# Patient Record
Sex: FEMALE | Race: WHITE | URBAN - METROPOLITAN AREA
[De-identification: names, ages, dates, MRNs, and addresses within clinical notes are randomized per-mention and may not be internally consistent; named-entity substitution may affect disease eponyms.]

---

## 2018-07-02 ENCOUNTER — HOSPITAL ENCOUNTER (OUTPATIENT)
Dept: GENERAL RADIOLOGY | Age: 83
Discharge: HOME | End: 2018-07-02
Attending: ORTHOPAEDIC SURGERY | Admitting: ORTHOPAEDIC SURGERY

## 2018-07-02 DIAGNOSIS — M25.511 PAIN IN RIGHT SHOULDER: ICD-10-CM

## 2018-07-02 DIAGNOSIS — M25.511 ACUTE PAIN OF RIGHT SHOULDER: ICD-10-CM

## 2018-07-02 DIAGNOSIS — M25.511 RIGHT SHOULDER PAIN, UNSPECIFIED CHRONICITY: ICD-10-CM

## 2018-07-02 RX ORDER — LIDOCAINE HYDROCHLORIDE 10 MG/ML
INJECTION, SOLUTION EPIDURAL; INFILTRATION; INTRACAUDAL; PERINEURAL
Status: COMPLETED
Start: 2018-07-02 | End: 2018-07-02

## 2018-07-02 RX ORDER — 0.9 % SODIUM CHLORIDE 0.9 %
VIAL (ML) INJECTION
Status: COMPLETED
Start: 2018-07-02 | End: 2018-07-02

## 2018-07-02 RX ADMIN — LIDOCAINE HYDROCHLORIDE 5 ML: 10 INJECTION, SOLUTION EPIDURAL; INFILTRATION; INTRACAUDAL; PERINEURAL at 11:51

## 2018-07-02 RX ADMIN — Medication 10 ML: at 11:50

## 2023-08-26 ENCOUNTER — HOSPITAL ENCOUNTER (INPATIENT)
Age: 88
LOS: 3 days | Discharge: NURSING, REHAB OR LONG-TERM CARE | DRG: 066 | End: 2023-08-29
Attending: STUDENT IN AN ORGANIZED HEALTH CARE EDUCATION/TRAINING PROGRAM | Admitting: STUDENT IN AN ORGANIZED HEALTH CARE EDUCATION/TRAINING PROGRAM
Payer: MEDICARE

## 2023-08-26 ENCOUNTER — APPOINTMENT (OUTPATIENT)
Dept: CT IMAGING | Age: 88
DRG: 066 | End: 2023-08-26
Payer: MEDICARE

## 2023-08-26 DIAGNOSIS — R29.898 ARM WEAKNESS: ICD-10-CM

## 2023-08-26 DIAGNOSIS — H53.2 DIPLOPIA: Primary | ICD-10-CM

## 2023-08-26 PROBLEM — I10 ESSENTIAL HYPERTENSION: Status: ACTIVE | Noted: 2023-08-26

## 2023-08-26 PROBLEM — I63.9 LEFT PONTINE STROKE (HCC): Status: ACTIVE | Noted: 2023-08-26

## 2023-08-26 PROBLEM — H51.22 INTERNUCLEAR OPHTHALMOPLEGIA OF LEFT EYE: Status: ACTIVE | Noted: 2023-08-26

## 2023-08-26 PROBLEM — E78.49 OTHER HYPERLIPIDEMIA: Status: ACTIVE | Noted: 2023-08-26

## 2023-08-26 LAB
ALBUMIN SERPL-MCNC: 3.5 G/DL (ref 3.4–5)
ALBUMIN/GLOB SERPL: 1.1 {RATIO} (ref 1.1–2.2)
ALP SERPL-CCNC: 74 U/L (ref 40–129)
ALT SERPL-CCNC: 9 U/L (ref 10–40)
ANION GAP SERPL CALCULATED.3IONS-SCNC: 13 MMOL/L (ref 3–16)
AST SERPL-CCNC: 18 U/L (ref 15–37)
BASOPHILS # BLD: 0 K/UL (ref 0–0.2)
BASOPHILS NFR BLD: 0.4 %
BILIRUB SERPL-MCNC: 0.8 MG/DL (ref 0–1)
BUN SERPL-MCNC: 14 MG/DL (ref 7–20)
CALCIUM SERPL-MCNC: 9 MG/DL (ref 8.3–10.6)
CHLORIDE SERPL-SCNC: 99 MMOL/L (ref 99–110)
CHOLEST SERPL-MCNC: 153 MG/DL (ref 0–199)
CO2 SERPL-SCNC: 22 MMOL/L (ref 21–32)
CREAT SERPL-MCNC: 1 MG/DL (ref 0.6–1.2)
DEPRECATED RDW RBC AUTO: 14.6 % (ref 12.4–15.4)
EKG ATRIAL RATE: 60 BPM
EKG DIAGNOSIS: NORMAL
EKG P-R INTERVAL: 218 MS
EKG Q-T INTERVAL: 484 MS
EKG QRS DURATION: 154 MS
EKG QTC CALCULATION (BAZETT): 484 MS
EKG R AXIS: -81 DEGREES
EKG T AXIS: 86 DEGREES
EKG VENTRICULAR RATE: 60 BPM
EOSINOPHIL # BLD: 0.1 K/UL (ref 0–0.6)
EOSINOPHIL NFR BLD: 0.6 %
GFR SERPLBLD CREATININE-BSD FMLA CKD-EPI: 53 ML/MIN/{1.73_M2}
GLUCOSE BLD-MCNC: 122 MG/DL (ref 70–99)
GLUCOSE SERPL-MCNC: 122 MG/DL (ref 70–99)
HCT VFR BLD AUTO: 37.3 % (ref 36–48)
HDLC SERPL-MCNC: 34 MG/DL (ref 40–60)
HGB BLD-MCNC: 13 G/DL (ref 12–16)
INR PPP: 1.2 (ref 0.84–1.16)
LDLC SERPL CALC-MCNC: 100 MG/DL
LYMPHOCYTES # BLD: 0.7 K/UL (ref 1–5.1)
LYMPHOCYTES NFR BLD: 7.7 %
MCH RBC QN AUTO: 30.6 PG (ref 26–34)
MCHC RBC AUTO-ENTMCNC: 34.7 G/DL (ref 31–36)
MCV RBC AUTO: 88.1 FL (ref 80–100)
MONOCYTES # BLD: 0.9 K/UL (ref 0–1.3)
MONOCYTES NFR BLD: 9.6 %
NEUTROPHILS # BLD: 7.6 K/UL (ref 1.7–7.7)
NEUTROPHILS NFR BLD: 81.7 %
PERFORMED ON: ABNORMAL
PLATELET # BLD AUTO: 208 K/UL (ref 135–450)
PMV BLD AUTO: 9 FL (ref 5–10.5)
POTASSIUM SERPL-SCNC: 4 MMOL/L (ref 3.5–5.1)
PROT SERPL-MCNC: 6.7 G/DL (ref 6.4–8.2)
PROTHROMBIN TIME: 15.2 SEC (ref 11.5–14.8)
RBC # BLD AUTO: 4.24 M/UL (ref 4–5.2)
SODIUM SERPL-SCNC: 134 MMOL/L (ref 136–145)
TRIGL SERPL-MCNC: 94 MG/DL (ref 0–150)
TROPONIN, HIGH SENSITIVITY: 93 NG/L (ref 0–14)
VLDLC SERPL CALC-MCNC: 19 MG/DL
WBC # BLD AUTO: 9.3 K/UL (ref 4–11)

## 2023-08-26 PROCEDURE — 6370000000 HC RX 637 (ALT 250 FOR IP): Performed by: STUDENT IN AN ORGANIZED HEALTH CARE EDUCATION/TRAINING PROGRAM

## 2023-08-26 PROCEDURE — 83036 HEMOGLOBIN GLYCOSYLATED A1C: CPT

## 2023-08-26 PROCEDURE — 84484 ASSAY OF TROPONIN QUANT: CPT

## 2023-08-26 PROCEDURE — 93005 ELECTROCARDIOGRAM TRACING: CPT

## 2023-08-26 PROCEDURE — 70450 CT HEAD/BRAIN W/O DYE: CPT

## 2023-08-26 PROCEDURE — 80053 COMPREHEN METABOLIC PANEL: CPT

## 2023-08-26 PROCEDURE — 6370000000 HC RX 637 (ALT 250 FOR IP): Performed by: PSYCHIATRY & NEUROLOGY

## 2023-08-26 PROCEDURE — 6360000002 HC RX W HCPCS: Performed by: STUDENT IN AN ORGANIZED HEALTH CARE EDUCATION/TRAINING PROGRAM

## 2023-08-26 PROCEDURE — 99285 EMERGENCY DEPT VISIT HI MDM: CPT

## 2023-08-26 PROCEDURE — 85025 COMPLETE CBC W/AUTO DIFF WBC: CPT

## 2023-08-26 PROCEDURE — 2580000003 HC RX 258: Performed by: STUDENT IN AN ORGANIZED HEALTH CARE EDUCATION/TRAINING PROGRAM

## 2023-08-26 PROCEDURE — 99223 1ST HOSP IP/OBS HIGH 75: CPT | Performed by: PSYCHIATRY & NEUROLOGY

## 2023-08-26 PROCEDURE — 1200000000 HC SEMI PRIVATE

## 2023-08-26 PROCEDURE — 70496 CT ANGIOGRAPHY HEAD: CPT

## 2023-08-26 PROCEDURE — 80061 LIPID PANEL: CPT

## 2023-08-26 PROCEDURE — 85610 PROTHROMBIN TIME: CPT

## 2023-08-26 PROCEDURE — 6360000004 HC RX CONTRAST MEDICATION: Performed by: STUDENT IN AN ORGANIZED HEALTH CARE EDUCATION/TRAINING PROGRAM

## 2023-08-26 RX ORDER — ATORVASTATIN CALCIUM 40 MG/1
40 TABLET, FILM COATED ORAL NIGHTLY
Status: DISCONTINUED | OUTPATIENT
Start: 2023-08-26 | End: 2023-08-29 | Stop reason: HOSPADM

## 2023-08-26 RX ORDER — SODIUM CHLORIDE 9 MG/ML
INJECTION, SOLUTION INTRAVENOUS PRN
Status: DISCONTINUED | OUTPATIENT
Start: 2023-08-26 | End: 2023-08-29 | Stop reason: HOSPADM

## 2023-08-26 RX ORDER — METOPROLOL SUCCINATE 25 MG/1
12.5 TABLET, EXTENDED RELEASE ORAL ONCE
Status: DISCONTINUED | OUTPATIENT
Start: 2023-08-26 | End: 2023-08-26

## 2023-08-26 RX ORDER — ACETAMINOPHEN 650 MG/1
650 SUPPOSITORY RECTAL EVERY 6 HOURS PRN
Status: DISCONTINUED | OUTPATIENT
Start: 2023-08-26 | End: 2023-08-29 | Stop reason: HOSPADM

## 2023-08-26 RX ORDER — ENOXAPARIN SODIUM 100 MG/ML
40 INJECTION SUBCUTANEOUS DAILY
Status: DISCONTINUED | OUTPATIENT
Start: 2023-08-26 | End: 2023-08-29 | Stop reason: HOSPADM

## 2023-08-26 RX ORDER — ONDANSETRON 2 MG/ML
4 INJECTION INTRAMUSCULAR; INTRAVENOUS EVERY 6 HOURS PRN
Status: DISCONTINUED | OUTPATIENT
Start: 2023-08-26 | End: 2023-08-29 | Stop reason: HOSPADM

## 2023-08-26 RX ORDER — SODIUM CHLORIDE 0.9 % (FLUSH) 0.9 %
5-40 SYRINGE (ML) INJECTION EVERY 12 HOURS SCHEDULED
Status: DISCONTINUED | OUTPATIENT
Start: 2023-08-26 | End: 2023-08-29 | Stop reason: HOSPADM

## 2023-08-26 RX ORDER — LEVOTHYROXINE SODIUM 0.05 MG/1
50 TABLET ORAL DAILY
Status: DISCONTINUED | OUTPATIENT
Start: 2023-08-26 | End: 2023-08-29 | Stop reason: HOSPADM

## 2023-08-26 RX ORDER — ASPIRIN 81 MG/1
81 TABLET ORAL DAILY
Status: DISCONTINUED | OUTPATIENT
Start: 2023-08-26 | End: 2023-08-29 | Stop reason: HOSPADM

## 2023-08-26 RX ORDER — SODIUM CHLORIDE 0.9 % (FLUSH) 0.9 %
5-40 SYRINGE (ML) INJECTION PRN
Status: DISCONTINUED | OUTPATIENT
Start: 2023-08-26 | End: 2023-08-29 | Stop reason: HOSPADM

## 2023-08-26 RX ORDER — POLYETHYLENE GLYCOL 3350 17 G/17G
17 POWDER, FOR SOLUTION ORAL DAILY PRN
Status: DISCONTINUED | OUTPATIENT
Start: 2023-08-26 | End: 2023-08-29 | Stop reason: HOSPADM

## 2023-08-26 RX ORDER — ONDANSETRON 4 MG/1
4 TABLET, ORALLY DISINTEGRATING ORAL EVERY 8 HOURS PRN
Status: DISCONTINUED | OUTPATIENT
Start: 2023-08-26 | End: 2023-08-29 | Stop reason: HOSPADM

## 2023-08-26 RX ORDER — CLOPIDOGREL BISULFATE 75 MG/1
75 TABLET ORAL DAILY
Status: DISCONTINUED | OUTPATIENT
Start: 2023-08-26 | End: 2023-08-29 | Stop reason: HOSPADM

## 2023-08-26 RX ORDER — ACETAMINOPHEN 325 MG/1
650 TABLET ORAL EVERY 6 HOURS PRN
Status: DISCONTINUED | OUTPATIENT
Start: 2023-08-26 | End: 2023-08-29 | Stop reason: HOSPADM

## 2023-08-26 RX ORDER — ATORVASTATIN CALCIUM 20 MG/1
10 TABLET, FILM COATED ORAL ONCE
Status: DISCONTINUED | OUTPATIENT
Start: 2023-08-26 | End: 2023-08-26 | Stop reason: SDUPTHER

## 2023-08-26 RX ORDER — LEVOTHYROXINE SODIUM 0.05 MG/1
50 TABLET ORAL DAILY
Status: CANCELLED | OUTPATIENT
Start: 2023-08-26

## 2023-08-26 RX ADMIN — ATORVASTATIN CALCIUM 40 MG: 40 TABLET, FILM COATED ORAL at 20:52

## 2023-08-26 RX ADMIN — CLOPIDOGREL BISULFATE 75 MG: 75 TABLET ORAL at 19:04

## 2023-08-26 RX ADMIN — IOPAMIDOL 75 ML: 755 INJECTION, SOLUTION INTRAVENOUS at 12:18

## 2023-08-26 RX ADMIN — SODIUM CHLORIDE, PRESERVATIVE FREE 10 ML: 5 INJECTION INTRAVENOUS at 20:52

## 2023-08-26 RX ADMIN — ENOXAPARIN SODIUM 40 MG: 100 INJECTION SUBCUTANEOUS at 19:04

## 2023-08-26 RX ADMIN — ATORVASTATIN CALCIUM 10 MG: 20 TABLET, FILM COATED ORAL at 16:39

## 2023-08-26 RX ADMIN — ASPIRIN 81 MG: 81 TABLET, COATED ORAL at 19:04

## 2023-08-26 ASSESSMENT — LIFESTYLE VARIABLES
HOW MANY STANDARD DRINKS CONTAINING ALCOHOL DO YOU HAVE ON A TYPICAL DAY: PATIENT DOES NOT DRINK
HOW OFTEN DO YOU HAVE A DRINK CONTAINING ALCOHOL: NEVER

## 2023-08-26 ASSESSMENT — PAIN - FUNCTIONAL ASSESSMENT: PAIN_FUNCTIONAL_ASSESSMENT: NONE - DENIES PAIN

## 2023-08-26 NOTE — H&P
Hospital Medicine History & Physical      Date of Admission: 8/26/2023    Date of Service:  8/26/2023    [x]Admitted to Inpatient with expected LOS greater than two midnights due to medical therapy. []Placed in Observation status. Chief Admission Complaint: Double vision    Presenting Admission History: This is a 41-year-old female with past medical history of sick sinus syndrome s/p pacemaker, hypertension on metoprolol, hypothyroidism who presented to the emergency department with double vision. Patient states she woke up this morning and was weak but unable to to state if she had a focal weakness. She remembers calling her children at the time with her bedside phone. One of her children arrived at home. Patient was found on the ground. Patient stated at the time of her weakness she also noted her vision was abnormal specifically she was seeing double. At the time of my evaluation her double vision comes and goes. Patient also stated she was dizzy along with weakness before sustaining a fall. As patient lives alone the events of her fall are not thoroughly determined. In the emergency department patient was hemodynamically stable and afebrile. Labs mostly unremarkable. CT head, CTA head and neck was unremarkable.   Given patient's last known normal not a tPA candidate         Assessment/Plan:      #Diplopia  Rule out stroke, possible syncopal episode  CTA head and neck, CTA head with no acute intracranial abnormalities  Obtain lipid panel, A1c  Neurology evaluation  We will hold antihypertensive for permissive hypertension  Monitor telemetry  PM interrogation  Check orthostatic blood pressure  Defer further imaging per neurology      #Hypothyroidism-continue with levothyroxine  Check TSH    #Sick sinus syndrome-s/p pacemaker    #Hyperlipidemia-continue statin  Discussed management of the case in detail w/ the Emergency Department Provider: Elba Swift      Physical Exam Performed:      BP (!) 139/105

## 2023-08-26 NOTE — ED NOTES
ED TO INPATIENT SBAR HANDOFF    Patient Name: Justin Wright   :  1932  80 y.o. MRN:  [id number]  Preferred Name  Beth Casey  ED Room #:  A07/A07-07  Family/Caregiver Present no   Restraints no   Sitter no   Sepsis Risk Score Sepsis Risk Score: 0.75    Situation  Code Status: Prior No additional code details. Allergies: No known allergies  Weight: Patient Vitals for the past 96 hrs (Last 3 readings):   Weight   23 1148 132 lb (59.9 kg)     Arrived from: home  Chief Complaint:   Chief Complaint   Patient presents with    Fall     Patient reports she got this AM about 9am to go to the bathroom and felt like she was going to pass out so she laid down on the floor so she didn't fall on her face, had double vision and lightheadedness before she fell    Eye Problem     Left eye unable to look to the right, patient reports that is a new finding since this AM     Hospital Problem/Diagnosis:  Principal Problem:    Diplopia  Resolved Problems:    * No resolved hospital problems. *    Imaging:   CTA HEAD NECK W CONTRAST   Final Result      1. No significant stenosis of the arteries of the neck. PROCEDURE: CT ANGIOGRAPHY HEAD WITH/WITHOUT CONTRAST      INDICATION: stroke      COMPARISON: none      TECHNIQUE: Axial CT imaging obtained through the head prior to and following   administration of IV contrast. Axial images, multiplanar reformatted images, and   maximum intensity projection images were reviewed for CT angiographic technique. IV contrast: 75 mL Isovue-370. FINDINGS:      ANTERIOR CIRCULATION: The intracranial internal carotid arteries, anterior   cerebral arteries, and middle cerebral arteries demonstrate no occlusion or   stenosis. No evidence for aneurysm or arteriovenous malformation. Anterior   cerebral arteries supplied via the left with hypoplastic or absent right A1.       POSTERIOR CIRCULATION: The bilateral vertebral arteries, basilar artery and   posterior cerebral arteries

## 2023-08-26 NOTE — CONSULTS
Neurology Consultation Note      Patient: Jovanni Martinez MRN: [id number]    YOB: 1932  Age: 80 y.o.   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  She feels that her right arm is weaker than baseline but it would be unusual for stroke (brainstem or otherwise) to lead to only shoulder weakness and completely spare the elbow and hand  Feel that it is not a coincidence that she has chronic right shoulder issues 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 and is having right shoulder weakness now (in other words, do not feel that her right arm weakness is related to her eye finding)  She cannot have MRI due to incompatible pacemaker leads, but MRI could potentially be negative (given how small the stroke would have to be to give this isolated symptom) and/or not   Stroke of this kind is undoubtedly secondary to small vessel ischemic disease and so identification and tight control of these risk factors is of main importance  Would interrogate pacer to see if any underlying afib, but nature of stroke of this type is not embolic (if afib present, then this warrants treatment irrespective of this stroke)  In her case, main modifiable risk factor appears to be her hypertension & lipids ( in May); and main non-modifiable risk factor is her age  Still need to exclude any contribution from lipids and/or

## 2023-08-27 LAB
ALBUMIN SERPL-MCNC: 3.1 G/DL (ref 3.4–5)
ALBUMIN/GLOB SERPL: 0.9 {RATIO} (ref 1.1–2.2)
ALP SERPL-CCNC: 73 U/L (ref 40–129)
ALT SERPL-CCNC: 8 U/L (ref 10–40)
ANION GAP SERPL CALCULATED.3IONS-SCNC: 13 MMOL/L (ref 3–16)
AST SERPL-CCNC: 17 U/L (ref 15–37)
BASOPHILS # BLD: 0.1 K/UL (ref 0–0.2)
BASOPHILS NFR BLD: 0.5 %
BILIRUB SERPL-MCNC: 0.7 MG/DL (ref 0–1)
BUN SERPL-MCNC: 13 MG/DL (ref 7–20)
CALCIUM SERPL-MCNC: 8.8 MG/DL (ref 8.3–10.6)
CHLORIDE SERPL-SCNC: 99 MMOL/L (ref 99–110)
CO2 SERPL-SCNC: 20 MMOL/L (ref 21–32)
CREAT SERPL-MCNC: 1 MG/DL (ref 0.6–1.2)
DEPRECATED RDW RBC AUTO: 14.7 % (ref 12.4–15.4)
EOSINOPHIL # BLD: 0.2 K/UL (ref 0–0.6)
EOSINOPHIL NFR BLD: 1.6 %
EST. AVERAGE GLUCOSE BLD GHB EST-MCNC: 114 MG/DL
GFR SERPLBLD CREATININE-BSD FMLA CKD-EPI: 53 ML/MIN/{1.73_M2}
GLUCOSE SERPL-MCNC: 156 MG/DL (ref 70–99)
HBA1C MFR BLD: 5.6 %
HCT VFR BLD AUTO: 38.6 % (ref 36–48)
HGB BLD-MCNC: 13.1 G/DL (ref 12–16)
LYMPHOCYTES # BLD: 0.7 K/UL (ref 1–5.1)
LYMPHOCYTES NFR BLD: 6.8 %
MCH RBC QN AUTO: 30.6 PG (ref 26–34)
MCHC RBC AUTO-ENTMCNC: 33.8 G/DL (ref 31–36)
MCV RBC AUTO: 90.5 FL (ref 80–100)
MONOCYTES # BLD: 0.7 K/UL (ref 0–1.3)
MONOCYTES NFR BLD: 7 %
NEUTROPHILS # BLD: 8.6 K/UL (ref 1.7–7.7)
NEUTROPHILS NFR BLD: 84.1 %
PHOSPHATE SERPL-MCNC: 2.7 MG/DL (ref 2.5–4.9)
PLATELET # BLD AUTO: 218 K/UL (ref 135–450)
PMV BLD AUTO: 8.6 FL (ref 5–10.5)
POTASSIUM SERPL-SCNC: 4 MMOL/L (ref 3.5–5.1)
PROT SERPL-MCNC: 6.6 G/DL (ref 6.4–8.2)
RBC # BLD AUTO: 4.27 M/UL (ref 4–5.2)
SODIUM SERPL-SCNC: 132 MMOL/L (ref 136–145)
WBC # BLD AUTO: 10.2 K/UL (ref 4–11)

## 2023-08-27 PROCEDURE — 1200000000 HC SEMI PRIVATE

## 2023-08-27 PROCEDURE — 85025 COMPLETE CBC W/AUTO DIFF WBC: CPT

## 2023-08-27 PROCEDURE — 36415 COLL VENOUS BLD VENIPUNCTURE: CPT

## 2023-08-27 PROCEDURE — 6370000000 HC RX 637 (ALT 250 FOR IP): Performed by: PSYCHIATRY & NEUROLOGY

## 2023-08-27 PROCEDURE — 6370000000 HC RX 637 (ALT 250 FOR IP): Performed by: STUDENT IN AN ORGANIZED HEALTH CARE EDUCATION/TRAINING PROGRAM

## 2023-08-27 PROCEDURE — 80053 COMPREHEN METABOLIC PANEL: CPT

## 2023-08-27 PROCEDURE — 6360000002 HC RX W HCPCS: Performed by: STUDENT IN AN ORGANIZED HEALTH CARE EDUCATION/TRAINING PROGRAM

## 2023-08-27 PROCEDURE — 92610 EVALUATE SWALLOWING FUNCTION: CPT

## 2023-08-27 PROCEDURE — 2580000003 HC RX 258: Performed by: STUDENT IN AN ORGANIZED HEALTH CARE EDUCATION/TRAINING PROGRAM

## 2023-08-27 PROCEDURE — 92523 SPEECH SOUND LANG COMPREHEN: CPT

## 2023-08-27 PROCEDURE — 84100 ASSAY OF PHOSPHORUS: CPT

## 2023-08-27 RX ADMIN — ATORVASTATIN CALCIUM 40 MG: 40 TABLET, FILM COATED ORAL at 21:42

## 2023-08-27 RX ADMIN — CLOPIDOGREL BISULFATE 75 MG: 75 TABLET ORAL at 09:47

## 2023-08-27 RX ADMIN — ENOXAPARIN SODIUM 40 MG: 100 INJECTION SUBCUTANEOUS at 09:47

## 2023-08-27 RX ADMIN — SODIUM CHLORIDE, PRESERVATIVE FREE 5 ML: 5 INJECTION INTRAVENOUS at 09:50

## 2023-08-27 RX ADMIN — ASPIRIN 81 MG: 81 TABLET, COATED ORAL at 09:47

## 2023-08-27 RX ADMIN — SODIUM CHLORIDE, PRESERVATIVE FREE 10 ML: 5 INJECTION INTRAVENOUS at 21:43

## 2023-08-27 RX ADMIN — LEVOTHYROXINE SODIUM 50 MCG: 50 TABLET ORAL at 06:55

## 2023-08-27 NOTE — PLAN OF CARE
Patient will tolerate  least restrictive diet without s/s of aspiration. Pt will increase functional communication/cognitive skills for daily living success.     Riaz Herman MA CCC/SLP [id number]

## 2023-08-27 NOTE — PLAN OF CARE
Problem: Discharge Planning  Goal: Discharge to home or other facility with appropriate resources  Outcome: Progressing  Flowsheets (Taken 8/26/2023 2242)  Discharge to home or other facility with appropriate resources:   Identify barriers to discharge with patient and caregiver   Refer to discharge planning if patient needs post-hospital services based on physician order or complex needs related to functional status, cognitive ability or social support system     Problem: Skin/Tissue Integrity  Goal: Absence of new skin breakdown  Description: 1. Monitor for areas of redness and/or skin breakdown  2. Assess vascular access sites hourly  3. Every 4-6 hours minimum:  Change oxygen saturation probe site  4. Every 4-6 hours:  If on nasal continuous positive airway pressure, respiratory therapy assess nares and determine need for appliance change or resting period.   Outcome: Progressing  Note: Skins remains intact     Problem: Safety - Adult  Goal: Free from fall injury  Outcome: Progressing  Flowsheets (Taken 8/26/2023 2242)  Free From Fall Injury: Instruct family/caregiver on patient safety     Problem: ABCDS Injury Assessment  Goal: Absence of physical injury  Outcome: Progressing  Flowsheets (Taken 8/26/2023 2242)  Absence of Physical Injury: Implement safety measures based on patient assessment

## 2023-08-28 PROCEDURE — 1200000000 HC SEMI PRIVATE

## 2023-08-28 PROCEDURE — 92507 TX SP LANG VOICE COMM INDIV: CPT

## 2023-08-28 PROCEDURE — 2580000003 HC RX 258: Performed by: STUDENT IN AN ORGANIZED HEALTH CARE EDUCATION/TRAINING PROGRAM

## 2023-08-28 PROCEDURE — 97166 OT EVAL MOD COMPLEX 45 MIN: CPT

## 2023-08-28 PROCEDURE — 6360000002 HC RX W HCPCS: Performed by: STUDENT IN AN ORGANIZED HEALTH CARE EDUCATION/TRAINING PROGRAM

## 2023-08-28 PROCEDURE — 6370000000 HC RX 637 (ALT 250 FOR IP): Performed by: STUDENT IN AN ORGANIZED HEALTH CARE EDUCATION/TRAINING PROGRAM

## 2023-08-28 PROCEDURE — 97162 PT EVAL MOD COMPLEX 30 MIN: CPT

## 2023-08-28 PROCEDURE — 97530 THERAPEUTIC ACTIVITIES: CPT

## 2023-08-28 PROCEDURE — 97535 SELF CARE MNGMENT TRAINING: CPT

## 2023-08-28 PROCEDURE — 6370000000 HC RX 637 (ALT 250 FOR IP): Performed by: PSYCHIATRY & NEUROLOGY

## 2023-08-28 PROCEDURE — 92526 ORAL FUNCTION THERAPY: CPT

## 2023-08-28 RX ORDER — METOPROLOL SUCCINATE 25 MG/1
12.5 TABLET, EXTENDED RELEASE ORAL DAILY
Status: DISCONTINUED | OUTPATIENT
Start: 2023-08-28 | End: 2023-08-29 | Stop reason: HOSPADM

## 2023-08-28 RX ORDER — ATORVASTATIN CALCIUM 20 MG/1
40 TABLET, FILM COATED ORAL DAILY
Qty: 30 TABLET | Refills: 1 | Status: SHIPPED | OUTPATIENT
Start: 2023-08-28

## 2023-08-28 RX ORDER — CLOPIDOGREL BISULFATE 75 MG/1
75 TABLET ORAL DAILY
Qty: 18 TABLET | Refills: 0
Start: 2023-08-29 | End: 2023-09-16

## 2023-08-28 RX ORDER — METOPROLOL SUCCINATE 25 MG/1
12.5 TABLET, EXTENDED RELEASE ORAL DAILY
Qty: 30 TABLET | Refills: 1
Start: 2023-08-28

## 2023-08-28 RX ADMIN — ENOXAPARIN SODIUM 40 MG: 100 INJECTION SUBCUTANEOUS at 09:04

## 2023-08-28 RX ADMIN — LEVOTHYROXINE SODIUM 50 MCG: 50 TABLET ORAL at 05:50

## 2023-08-28 RX ADMIN — SODIUM CHLORIDE, PRESERVATIVE FREE 10 ML: 5 INJECTION INTRAVENOUS at 20:17

## 2023-08-28 RX ADMIN — ASPIRIN 81 MG: 81 TABLET, COATED ORAL at 09:04

## 2023-08-28 RX ADMIN — ACETAMINOPHEN 650 MG: 325 TABLET ORAL at 09:11

## 2023-08-28 RX ADMIN — SODIUM CHLORIDE, PRESERVATIVE FREE 10 ML: 5 INJECTION INTRAVENOUS at 09:05

## 2023-08-28 RX ADMIN — ATORVASTATIN CALCIUM 40 MG: 40 TABLET, FILM COATED ORAL at 20:17

## 2023-08-28 RX ADMIN — CLOPIDOGREL BISULFATE 75 MG: 75 TABLET ORAL at 09:04

## 2023-08-28 ASSESSMENT — PAIN DESCRIPTION - ORIENTATION
ORIENTATION: RIGHT
ORIENTATION: RIGHT

## 2023-08-28 ASSESSMENT — PAIN SCALES - GENERAL
PAINLEVEL_OUTOF10: 3
PAINLEVEL_OUTOF10: 2
PAINLEVEL_OUTOF10: 0
PAINLEVEL_OUTOF10: 0

## 2023-08-28 ASSESSMENT — PAIN DESCRIPTION - LOCATION
LOCATION: EYE
LOCATION: EYE

## 2023-08-28 ASSESSMENT — PAIN DESCRIPTION - DESCRIPTORS
DESCRIPTORS: BURNING
DESCRIPTORS: BURNING

## 2023-08-28 ASSESSMENT — PAIN - FUNCTIONAL ASSESSMENT
PAIN_FUNCTIONAL_ASSESSMENT: PREVENTS OR INTERFERES SOME ACTIVE ACTIVITIES AND ADLS
PAIN_FUNCTIONAL_ASSESSMENT: PREVENTS OR INTERFERES SOME ACTIVE ACTIVITIES AND ADLS

## 2023-08-28 NOTE — CARE COORDINATION
Case Management Assessment  Initial Evaluation    Date/Time of Evaluation: 8/28/2023 4:21 PM  Assessment Completed by: Evette Fontana RN    If patient is discharged prior to next notation, then this note serves as note for discharge by case management. Patient Name: Vernon Jacobs                   YOB: 1932  Diagnosis: Diplopia [H53.2]  Arm weakness [R29.898]                   Date / Time: 8/26/2023 11:41 AM    Patient Admission Status: Inpatient   Readmission Risk (Low < 19, Mod (19-27), High > 27): Readmission Risk Score: 13.8    Current PCP: Maryann Norwood MD  PCP verified by CM? No    Chart Reviewed: Yes      History Provided by: Patient  Patient Orientation: Alert and Oriented    Patient Cognition: Alert    Hospitalization in the last 30 days (Readmission):  No    If yes, Readmission Assessment in  Navigator will be completed. Advance Directives:      Code Status: DNR-CCA   Patient's Primary Decision Maker is: Legal Next of Kin      Discharge Planning:    Patient lives with: Alone Type of Home: Independent Living  Primary Care Giver: Self  Patient Support Systems include: Children   Current Financial resources: Medicare  Current community resources: ECF/Home Care  Current services prior to admission: Durable Medical Equipment            Current DME: Merwyn Parra, Wheelchair            Type of Home Care services:  None    ADLS  Prior functional level: Independent in ADLs/IADLs  Current functional level: Assistance with the following:, Mobility    PT AM-PAC: 13 /24  OT AM-PAC: 14 /24    Family can provide assistance at DC: Yes  Would you like Case Management to discuss the discharge plan with any other family members/significant others, and if so, who?  No  Plans to Return to Present Housing: Unknown at present  Other Identified Issues/Barriers to RETURNING to current housing: none  Potential Assistance needed at discharge: N/A            Potential DME:    Patient expects to discharge to:

## 2023-08-28 NOTE — PLAN OF CARE
Problem: Discharge Planning  Goal: Discharge to home or other facility with appropriate resources  8/28/2023 0018 by Jarad Noble RN  Outcome: Progressing  Note: CM will follow for discharge. Flowsheets (Taken 8/27/2023 2300)  Discharge to home or other facility with appropriate resources: Identify barriers to discharge with patient and caregiver     Problem: Skin/Tissue Integrity  Goal: Absence of new skin breakdown  Description: 1. Monitor for areas of redness and/or skin breakdown  2. Assess vascular access sites hourly  3. Every 4-6 hours minimum:  Change oxygen saturation probe site  4. Every 4-6 hours:  If on nasal continuous positive airway pressure, respiratory therapy assess nares and determine need for appliance change or resting period. 8/28/2023 0018 by Jarad Noble RN  Outcome: Progressing     Problem: Safety - Adult  Goal: Free from fall injury  8/28/2023 0018 by Jarad Noble RN  Outcome: Progressing  Note: All standard safety precautions in place, call light within reach, no fall injury. Flowsheets (Taken 8/28/2023 0015)  Free From Fall Injury: Instruct family/caregiver on patient safety     Problem: ABCDS Injury Assessment  Goal: Absence of physical injury  Outcome: Progressing  Note: Pt free from physical injury.    Flowsheets (Taken 8/28/2023 0015)  Absence of Physical Injury: Implement safety measures based on patient assessment

## 2023-08-28 NOTE — PLAN OF CARE
Problem: Discharge Planning  Goal: Discharge to home or other facility with appropriate resources  8/28/2023 1404 by Loulou Gonzáles RN  Outcome: Progressing  Flowsheets (Taken 8/28/2023 0300 by Shobha Calzada RN)  Discharge to home or other facility with appropriate resources:   Identify barriers to discharge with patient and caregiver   Arrange for needed discharge resources and transportation as appropriate  Note: Patient will have all needs met prior to discharge      Problem: Safety - Adult  Goal: Free from fall injury  8/28/2023 1404 by Loulou Gonzáles RN  Outcome: Progressing  Note: Patient will remain fall free during stay by implementing and following all safety precautions.    8/28/2023 0018 by Shobha Calzada RN  Outcome: Progressing  Flowsheets (Taken 8/28/2023 0015)  Free From Fall Injury: Instruct family/caregiver on patient safety

## 2023-08-29 VITALS
OXYGEN SATURATION: 94 % | BODY MASS INDEX: 21.99 KG/M2 | WEIGHT: 132 LBS | HEIGHT: 65 IN | RESPIRATION RATE: 18 BRPM | SYSTOLIC BLOOD PRESSURE: 145 MMHG | TEMPERATURE: 98 F | HEART RATE: 62 BPM | DIASTOLIC BLOOD PRESSURE: 68 MMHG

## 2023-08-29 PROCEDURE — 6370000000 HC RX 637 (ALT 250 FOR IP): Performed by: STUDENT IN AN ORGANIZED HEALTH CARE EDUCATION/TRAINING PROGRAM

## 2023-08-29 PROCEDURE — 97110 THERAPEUTIC EXERCISES: CPT

## 2023-08-29 PROCEDURE — 97116 GAIT TRAINING THERAPY: CPT

## 2023-08-29 PROCEDURE — 6370000000 HC RX 637 (ALT 250 FOR IP): Performed by: PSYCHIATRY & NEUROLOGY

## 2023-08-29 PROCEDURE — 6360000002 HC RX W HCPCS: Performed by: STUDENT IN AN ORGANIZED HEALTH CARE EDUCATION/TRAINING PROGRAM

## 2023-08-29 PROCEDURE — 2580000003 HC RX 258: Performed by: STUDENT IN AN ORGANIZED HEALTH CARE EDUCATION/TRAINING PROGRAM

## 2023-08-29 PROCEDURE — 97530 THERAPEUTIC ACTIVITIES: CPT

## 2023-08-29 RX ADMIN — ASPIRIN 81 MG: 81 TABLET, COATED ORAL at 10:11

## 2023-08-29 RX ADMIN — CLOPIDOGREL BISULFATE 75 MG: 75 TABLET ORAL at 10:11

## 2023-08-29 RX ADMIN — ENOXAPARIN SODIUM 40 MG: 100 INJECTION SUBCUTANEOUS at 10:11

## 2023-08-29 RX ADMIN — ACETAMINOPHEN 650 MG: 325 TABLET ORAL at 11:15

## 2023-08-29 RX ADMIN — POLYETHYLENE GLYCOL 3350 17 G: 17 POWDER, FOR SOLUTION ORAL at 14:26

## 2023-08-29 RX ADMIN — LEVOTHYROXINE SODIUM 50 MCG: 50 TABLET ORAL at 05:22

## 2023-08-29 RX ADMIN — SODIUM CHLORIDE, PRESERVATIVE FREE 10 ML: 5 INJECTION INTRAVENOUS at 10:13

## 2023-08-29 ASSESSMENT — PAIN - FUNCTIONAL ASSESSMENT: PAIN_FUNCTIONAL_ASSESSMENT: ACTIVITIES ARE NOT PREVENTED

## 2023-08-29 ASSESSMENT — PAIN DESCRIPTION - PAIN TYPE: TYPE: CHRONIC PAIN

## 2023-08-29 ASSESSMENT — PAIN DESCRIPTION - DESCRIPTORS: DESCRIPTORS: ACHING

## 2023-08-29 ASSESSMENT — PAIN DESCRIPTION - LOCATION: LOCATION: BACK

## 2023-08-29 ASSESSMENT — PAIN DESCRIPTION - FREQUENCY: FREQUENCY: CONTINUOUS

## 2023-08-29 ASSESSMENT — PAIN DESCRIPTION - ORIENTATION: ORIENTATION: MID

## 2023-08-29 ASSESSMENT — PAIN DESCRIPTION - ONSET: ONSET: ON-GOING

## 2023-08-29 ASSESSMENT — PAIN SCALES - GENERAL
PAINLEVEL_OUTOF10: 6
PAINLEVEL_OUTOF10: 0

## 2023-08-29 NOTE — PLAN OF CARE
Problem: Safety - Adult  Goal: Free from fall injury  8/29/2023 1234 by Brian Loera RN  Outcome: Progressing  Flowsheets (Taken 8/29/2023 1234)  Free From Fall Injury: Instruct family/caregiver on patient safety  Note: PT have non-skid socks, bed alarm, call light in reach.       Problem: Pain  Goal: Verbalizes/displays adequate comfort level or baseline comfort level  8/29/2023 1234 by Brian Loera RN  Outcome: Progressing  Flowsheets (Taken 8/29/2023 1234)  Verbalizes/displays adequate comfort level or baseline comfort level:   Encourage patient to monitor pain and request assistance   Assess pain using appropriate pain scale     Problem: Discharge Planning  Goal: Discharge to home or other facility with appropriate resources  8/29/2023 1234 by Brian Loera RN  Outcome: Progressing  Flowsheets (Taken 8/29/2023 1234)  Discharge to home or other facility with appropriate resources:   Identify barriers to discharge with patient and caregiver   Arrange for needed discharge resources and transportation as appropriate   Identify discharge learning needs (meds, wound care, etc)  8/28/2023 2344 by Ritu Paige RN  Outcome: Progressing  Flowsheets (Taken 8/28/2023 2344)  Discharge to home or other facility with appropriate resources:   Identify barriers to discharge with patient and caregiver   Arrange for needed discharge resources and transportation as appropriate

## 2023-08-29 NOTE — CARE COORDINATION
Case Management Assessment            Discharge Note                    Date / Time of Note: 8/29/2023 12:15 PM                  Discharge Note Completed by: Davian Ceja RN    Patient Name: Evan Jimenez   YOB: 1932  Diagnosis: Diplopia [H53.2]  Arm weakness [R29.898]   Date / Time: 8/26/2023 11:41 AM    Current PCP: Karly Daley MD  Clinic patient: No    Hospitalization in the last 30 days: No       Advance Directives:  Code Status: DNR-CCA  [place] DNR form completed and on chart: Yes    Financial:  Payor: MEDICARE / Plan: MEDICARE PART A AND B / Product Type: *No Product type* /      Pharmacy:    [address] [facility] [address] [place] [address]  [address]  [address] [facility] [address]  Phone: [phone] Fax: [phone]      Assistance purchasing medications?:    Assistance provided by Case Management: None at this time    Does patient want to participate in local refill/ meds to beds program?: No    Meds To Beds General Rules:  1. Can ONLY be done Monday- Friday between 8:30am-5pm  2. Prescription(s) must be in pharmacy by 3pm to be filled same day  3. Copy of patient's insurance/ prescription drug card and patient face sheet must be sent along with the prescription(s)  4. Cost of Rx cannot be added to hospital bill. If financial assistance is needed, please contact unit  or ;  or  CANNOT provide pharmacy voucher for patients co-pays  5.  Patients can then  the prescription on their way out of the hospital at discharge, or pharmacy can deliver to the bedside if staff is available. (payment due at time of pick-up or delivery - cash, check, or card accepted)     Able to afford home medications/ co-pay costs: Yes    ADLS:  Current PT AM-PAC Score: 13 /24  Current OT AM-PAC Score: 14 /24      DISCHARGE Disposition: [address] (SNF): [place] at [facility]:

## 2023-08-29 NOTE — DISCHARGE INSTR - COC
Continuity of Care Form    Patient Name: Pili Martinez   :  1932  MRN:  [id number]    Admit date:  2023  Discharge date:  ***    Code Status Order: DNR-CCA   Advance Directives:     Admitting Physician:   Latrell Portillo MD  PCP: Fer España MD    Discharging Nurse: [facility] Unit/Room#: 8185/5034-85  Discharging Unit Phone Number: ***    Emergency Contact:   Extended Emergency Contact Information  Primary Emergency Contact: Denver Lam  Address: [address] [place] [address], [address] [place] [address] of [address] [employer] Phone: [phone]  Work Phone: [phone]  Relation: Spouse  Secondary Emergency Contact: Josette Carrillo   [facility] of [address] [employer] Phone: [phone]  Relation: Child    Past Surgical History:  Past Surgical History:   Procedure Laterality Date    APPENDECTOMY      BREAST SURGERY      lumpectomy - benign    CARPAL TUNNEL RELEASE Right 14    COLONOSCOPY  04    Sigmoid diverticulosis    COLONOSCOPY      FINGER TRIGGER RELEASE  4/10/2012    left middle finger, and left CTR    HERNIA REPAIR      rt inguinal    JOINT REPLACEMENT      THR right    JOINT REPLACEMENT      TKR left    PACEMAKER INSERTION      THYROIDECTOMY, PARTIAL         Immunization History:   Immunization History   Administered Date(s) Administered    Pneumococcal, PPSV23, PNEUMOVAX 23, (age 2y+), SC/IM, 0.5mL 2013       Active Problems:  Patient Active Problem List   Diagnosis Code    Trigger finger, acquired M65.30    Carpal tunnel syndrome G56.00    Pain in limb M79.609    Dermatophytosis of nail B35.1    Diplopia H53.2    Left pontine stroke ([address]) I63.9    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

## 2023-08-29 NOTE — PLAN OF CARE
Problem: Discharge Planning  Goal: Discharge to home or other facility with appropriate resources  8/28/2023 2344 by Abbie Vega RN  Outcome: Progressing  Flowsheets (Taken 8/28/2023 2344)  Discharge to home or other facility with appropriate resources:   Identify barriers to discharge with patient and caregiver   Arrange for needed discharge resources and transportation as appropriate     Problem: Skin/Tissue Integrity  Goal: Absence of new skin breakdown  Description: 1. Monitor for areas of redness and/or skin breakdown  2. Assess vascular access sites hourly  3. Every 4-6 hours minimum:  Change oxygen saturation probe site  4. Every 4-6 hours:  If on nasal continuous positive airway pressure, respiratory therapy assess nares and determine need for appliance change or resting period.   Outcome: Progressing     Problem: Safety - Adult  Goal: Free from fall injury  8/28/2023 2344 by Abbie Vega RN  Outcome: Progressing  Flowsheets (Taken 8/28/2023 2344)  Free From Fall Injury:   Jarrett Weston family/caregiver on patient safety   Based on caregiver fall risk screen, instruct family/caregiver to ask for assistance with transferring infant if caregiver noted to have fall risk factors     Problem: Pain  Goal: Verbalizes/displays adequate comfort level or baseline comfort level  Outcome: Progressing  Flowsheets (Taken 8/28/2023 2344)  Verbalizes/displays adequate comfort level or baseline comfort level:   Encourage patient to monitor pain and request assistance   Assess pain using appropriate pain scale

## 2023-08-30 NOTE — ED NOTES
RN screened patient's swallowing by administering the [facility] Protocol. The patient consumed 3 ounces of water by straw in sequential swallows without signs/symptoms of aspiration.       Prudence Portillo RN  08/30/23 6553

## 2023-09-15 ENCOUNTER — APPOINTMENT (OUTPATIENT)
Dept: CT IMAGING | Age: 88
DRG: 551 | End: 2023-09-15
Payer: MEDICARE

## 2023-09-15 ENCOUNTER — APPOINTMENT (OUTPATIENT)
Dept: GENERAL RADIOLOGY | Age: 88
DRG: 551 | End: 2023-09-15
Payer: MEDICARE

## 2023-09-15 ENCOUNTER — HOSPITAL ENCOUNTER (INPATIENT)
Age: 88
LOS: 3 days | Discharge: HOME | DRG: 551 | End: 2023-09-19
Attending: EMERGENCY MEDICINE | Admitting: FAMILY MEDICINE
Payer: MEDICARE

## 2023-09-15 DIAGNOSIS — M51.36 DEGENERATIVE DISC DISEASE, LUMBAR: ICD-10-CM

## 2023-09-15 DIAGNOSIS — E87.1 HYPONATREMIA: ICD-10-CM

## 2023-09-15 DIAGNOSIS — E86.0 DEHYDRATION: ICD-10-CM

## 2023-09-15 DIAGNOSIS — M54.50 ACUTE RIGHT-SIDED LOW BACK PAIN WITHOUT SCIATICA: Primary | ICD-10-CM

## 2023-09-15 DIAGNOSIS — J18.9 PNEUMONIA OF BOTH LOWER LOBES DUE TO INFECTIOUS ORGANISM: ICD-10-CM

## 2023-09-15 LAB
ALBUMIN SERPL-MCNC: 3.3 G/DL (ref 3.4–5)
ALBUMIN/GLOB SERPL: 0.8 {RATIO} (ref 1.1–2.2)
ALP SERPL-CCNC: 168 U/L (ref 40–129)
ALT SERPL-CCNC: 30 U/L (ref 10–40)
ANION GAP SERPL CALCULATED.3IONS-SCNC: 11 MMOL/L (ref 3–16)
AST SERPL-CCNC: 52 U/L (ref 15–37)
BACTERIA URNS QL MICRO: ABNORMAL /HPF
BASOPHILS # BLD: 0.1 K/UL (ref 0–0.2)
BASOPHILS NFR BLD: 0.6 %
BILIRUB SERPL-MCNC: 0.9 MG/DL (ref 0–1)
BILIRUB UR QL STRIP.AUTO: NEGATIVE
BUN SERPL-MCNC: 23 MG/DL (ref 7–20)
CALCIUM SERPL-MCNC: 9.8 MG/DL (ref 8.3–10.6)
CHLORIDE SERPL-SCNC: 92 MMOL/L (ref 99–110)
CLARITY UR: CLEAR
CO2 SERPL-SCNC: 26 MMOL/L (ref 21–32)
COLOR UR: YELLOW
CREAT SERPL-MCNC: 0.9 MG/DL (ref 0.6–1.2)
DEPRECATED RDW RBC AUTO: 15.3 % (ref 12.4–15.4)
EOSINOPHIL # BLD: 0.2 K/UL (ref 0–0.6)
EOSINOPHIL NFR BLD: 1.8 %
EPI CELLS #/AREA URNS HPF: ABNORMAL /HPF (ref 0–5)
GFR SERPLBLD CREATININE-BSD FMLA CKD-EPI: >60 ML/MIN/{1.73_M2}
GLUCOSE SERPL-MCNC: 118 MG/DL (ref 70–99)
GLUCOSE UR STRIP.AUTO-MCNC: NEGATIVE MG/DL
HCT VFR BLD AUTO: 37.5 % (ref 36–48)
HGB BLD-MCNC: 12.7 G/DL (ref 12–16)
HGB UR QL STRIP.AUTO: ABNORMAL
KETONES UR STRIP.AUTO-MCNC: NEGATIVE MG/DL
LEUKOCYTE ESTERASE UR QL STRIP.AUTO: ABNORMAL
LYMPHOCYTES # BLD: 0.6 K/UL (ref 1–5.1)
LYMPHOCYTES NFR BLD: 6.5 %
MCH RBC QN AUTO: 29.6 PG (ref 26–34)
MCHC RBC AUTO-ENTMCNC: 33.8 G/DL (ref 31–36)
MCV RBC AUTO: 87.7 FL (ref 80–100)
MONOCYTES # BLD: 0.8 K/UL (ref 0–1.3)
MONOCYTES NFR BLD: 7.9 %
NEUTROPHILS # BLD: 7.9 K/UL (ref 1.7–7.7)
NEUTROPHILS NFR BLD: 83.2 %
NITRITE UR QL STRIP.AUTO: NEGATIVE
PH UR STRIP.AUTO: 6.5 [PH] (ref 5–8)
PLATELET # BLD AUTO: 284 K/UL (ref 135–450)
PMV BLD AUTO: 8.9 FL (ref 5–10.5)
POTASSIUM SERPL-SCNC: 4 MMOL/L (ref 3.5–5.1)
PROCALCITONIN SERPL IA-MCNC: 0.56 NG/ML (ref 0–0.15)
PROT SERPL-MCNC: 7.3 G/DL (ref 6.4–8.2)
PROT UR STRIP.AUTO-MCNC: NEGATIVE MG/DL
RBC # BLD AUTO: 4.27 M/UL (ref 4–5.2)
RBC #/AREA URNS HPF: ABNORMAL /HPF (ref 0–4)
SODIUM SERPL-SCNC: 129 MMOL/L (ref 136–145)
SP GR UR STRIP.AUTO: <=1.005 (ref 1–1.03)
TROPONIN, HIGH SENSITIVITY: 22 NG/L (ref 0–14)
UA COMPLETE W REFLEX CULTURE PNL UR: ABNORMAL
UA DIPSTICK W REFLEX MICRO PNL UR: YES
URN SPEC COLLECT METH UR: ABNORMAL
UROBILINOGEN UR STRIP-ACNC: 1 E.U./DL
WBC # BLD AUTO: 9.5 K/UL (ref 4–11)
WBC #/AREA URNS HPF: ABNORMAL /HPF (ref 0–5)

## 2023-09-15 PROCEDURE — 71046 X-RAY EXAM CHEST 2 VIEWS: CPT

## 2023-09-15 PROCEDURE — 84145 PROCALCITONIN (PCT): CPT

## 2023-09-15 PROCEDURE — G0378 HOSPITAL OBSERVATION PER HR: HCPCS

## 2023-09-15 PROCEDURE — 96365 THER/PROPH/DIAG IV INF INIT: CPT

## 2023-09-15 PROCEDURE — 6360000002 HC RX W HCPCS

## 2023-09-15 PROCEDURE — 99285 EMERGENCY DEPT VISIT HI MDM: CPT

## 2023-09-15 PROCEDURE — 6370000000 HC RX 637 (ALT 250 FOR IP)

## 2023-09-15 PROCEDURE — 36415 COLL VENOUS BLD VENIPUNCTURE: CPT

## 2023-09-15 PROCEDURE — 2580000003 HC RX 258: Performed by: FAMILY MEDICINE

## 2023-09-15 PROCEDURE — 76376 3D RENDER W/INTRP POSTPROCES: CPT

## 2023-09-15 PROCEDURE — 6360000002 HC RX W HCPCS: Performed by: FAMILY MEDICINE

## 2023-09-15 PROCEDURE — 6360000004 HC RX CONTRAST MEDICATION

## 2023-09-15 PROCEDURE — 6370000000 HC RX 637 (ALT 250 FOR IP): Performed by: FAMILY MEDICINE

## 2023-09-15 PROCEDURE — 2580000003 HC RX 258

## 2023-09-15 PROCEDURE — 74177 CT ABD & PELVIS W/CONTRAST: CPT

## 2023-09-15 PROCEDURE — 81001 URINALYSIS AUTO W/SCOPE: CPT

## 2023-09-15 PROCEDURE — 85025 COMPLETE CBC W/AUTO DIFF WBC: CPT

## 2023-09-15 PROCEDURE — 99222 1ST HOSP IP/OBS MODERATE 55: CPT | Performed by: FAMILY MEDICINE

## 2023-09-15 PROCEDURE — 80053 COMPREHEN METABOLIC PANEL: CPT

## 2023-09-15 PROCEDURE — 96375 TX/PRO/DX INJ NEW DRUG ADDON: CPT

## 2023-09-15 PROCEDURE — 84484 ASSAY OF TROPONIN QUANT: CPT

## 2023-09-15 RX ORDER — SODIUM CHLORIDE 0.9 % (FLUSH) 0.9 %
5-40 SYRINGE (ML) INJECTION PRN
Status: DISCONTINUED | OUTPATIENT
Start: 2023-09-15 | End: 2023-09-19 | Stop reason: HOSPADM

## 2023-09-15 RX ORDER — ACETAMINOPHEN 325 MG/1
325 TABLET ORAL PRN
COMMUNITY

## 2023-09-15 RX ORDER — ACETAMINOPHEN 500 MG
1000 TABLET ORAL
Status: COMPLETED | OUTPATIENT
Start: 2023-09-15 | End: 2023-09-15

## 2023-09-15 RX ORDER — CLOPIDOGREL BISULFATE 75 MG/1
75 TABLET ORAL DAILY
Status: DISCONTINUED | OUTPATIENT
Start: 2023-09-16 | End: 2023-09-16

## 2023-09-15 RX ORDER — AZITHROMYCIN 250 MG/1
500 TABLET, FILM COATED ORAL ONCE
Status: COMPLETED | OUTPATIENT
Start: 2023-09-15 | End: 2023-09-15

## 2023-09-15 RX ORDER — ENOXAPARIN SODIUM 100 MG/ML
40 INJECTION SUBCUTANEOUS DAILY
Status: DISCONTINUED | OUTPATIENT
Start: 2023-09-16 | End: 2023-09-15

## 2023-09-15 RX ORDER — SODIUM CHLORIDE 9 MG/ML
INJECTION, SOLUTION INTRAVENOUS CONTINUOUS
Status: DISCONTINUED | OUTPATIENT
Start: 2023-09-15 | End: 2023-09-17

## 2023-09-15 RX ORDER — ONDANSETRON 4 MG/1
4 TABLET, ORALLY DISINTEGRATING ORAL EVERY 8 HOURS PRN
Status: DISCONTINUED | OUTPATIENT
Start: 2023-09-15 | End: 2023-09-19 | Stop reason: HOSPADM

## 2023-09-15 RX ORDER — LEVOTHYROXINE SODIUM 0.05 MG/1
50 TABLET ORAL DAILY
Status: DISCONTINUED | OUTPATIENT
Start: 2023-09-16 | End: 2023-09-19 | Stop reason: HOSPADM

## 2023-09-15 RX ORDER — AZITHROMYCIN 250 MG/1
500 TABLET, FILM COATED ORAL EVERY 24 HOURS
Status: COMPLETED | OUTPATIENT
Start: 2023-09-16 | End: 2023-09-18

## 2023-09-15 RX ORDER — TIZANIDINE 2 MG/1
2 TABLET ORAL PRN
COMMUNITY

## 2023-09-15 RX ORDER — METHOCARBAMOL 750 MG/1
750 TABLET, FILM COATED ORAL 4 TIMES DAILY
Status: DISCONTINUED | OUTPATIENT
Start: 2023-09-15 | End: 2023-09-17

## 2023-09-15 RX ORDER — ACETAMINOPHEN 650 MG/1
650 SUPPOSITORY RECTAL EVERY 6 HOURS PRN
Status: DISCONTINUED | OUTPATIENT
Start: 2023-09-15 | End: 2023-09-19 | Stop reason: HOSPADM

## 2023-09-15 RX ORDER — SODIUM CHLORIDE 9 MG/ML
INJECTION, SOLUTION INTRAVENOUS PRN
Status: DISCONTINUED | OUTPATIENT
Start: 2023-09-15 | End: 2023-09-19 | Stop reason: HOSPADM

## 2023-09-15 RX ORDER — SODIUM CHLORIDE 0.9 % (FLUSH) 0.9 %
5-40 SYRINGE (ML) INJECTION EVERY 12 HOURS SCHEDULED
Status: DISCONTINUED | OUTPATIENT
Start: 2023-09-15 | End: 2023-09-19 | Stop reason: HOSPADM

## 2023-09-15 RX ORDER — POLYETHYLENE GLYCOL 3350 17 G/17G
17 POWDER, FOR SOLUTION ORAL DAILY PRN
Status: DISCONTINUED | OUTPATIENT
Start: 2023-09-15 | End: 2023-09-19 | Stop reason: HOSPADM

## 2023-09-15 RX ORDER — ENEMA 19; 7 G/133ML; G/133ML
1 ENEMA RECTAL DAILY PRN
COMMUNITY

## 2023-09-15 RX ORDER — ONDANSETRON 2 MG/ML
4 INJECTION INTRAMUSCULAR; INTRAVENOUS EVERY 6 HOURS PRN
Status: DISCONTINUED | OUTPATIENT
Start: 2023-09-15 | End: 2023-09-19 | Stop reason: HOSPADM

## 2023-09-15 RX ORDER — BENZONATATE 100 MG/1
100 CAPSULE ORAL 3 TIMES DAILY PRN
Status: DISCONTINUED | OUTPATIENT
Start: 2023-09-15 | End: 2023-09-19 | Stop reason: HOSPADM

## 2023-09-15 RX ORDER — OXYCODONE HYDROCHLORIDE 5 MG/1
5 TABLET ORAL ONCE
Status: COMPLETED | OUTPATIENT
Start: 2023-09-15 | End: 2023-09-15

## 2023-09-15 RX ORDER — BISACODYL 10 MG
10 SUPPOSITORY, RECTAL RECTAL DAILY PRN
COMMUNITY

## 2023-09-15 RX ORDER — ATORVASTATIN CALCIUM 40 MG/1
40 TABLET, FILM COATED ORAL NIGHTLY
Status: DISCONTINUED | OUTPATIENT
Start: 2023-09-15 | End: 2023-09-19 | Stop reason: HOSPADM

## 2023-09-15 RX ORDER — METOPROLOL SUCCINATE 25 MG/1
12.5 TABLET, EXTENDED RELEASE ORAL DAILY
Status: DISCONTINUED | OUTPATIENT
Start: 2023-09-16 | End: 2023-09-19 | Stop reason: HOSPADM

## 2023-09-15 RX ORDER — ACETAMINOPHEN 325 MG/1
650 TABLET ORAL EVERY 6 HOURS PRN
Status: DISCONTINUED | OUTPATIENT
Start: 2023-09-15 | End: 2023-09-19 | Stop reason: HOSPADM

## 2023-09-15 RX ORDER — KETOROLAC TROMETHAMINE 30 MG/ML
15 INJECTION, SOLUTION INTRAMUSCULAR; INTRAVENOUS ONCE
Status: COMPLETED | OUTPATIENT
Start: 2023-09-15 | End: 2023-09-15

## 2023-09-15 RX ORDER — HYDROCODONE BITARTRATE AND ACETAMINOPHEN 5; 325 MG/1; MG/1
1 TABLET ORAL EVERY 6 HOURS PRN
Status: DISCONTINUED | OUTPATIENT
Start: 2023-09-15 | End: 2023-09-18

## 2023-09-15 RX ADMIN — KETOROLAC TROMETHAMINE 15 MG: 30 INJECTION, SOLUTION INTRAMUSCULAR; INTRAVENOUS at 23:12

## 2023-09-15 RX ADMIN — IOPAMIDOL 75 ML: 755 INJECTION, SOLUTION INTRAVENOUS at 20:09

## 2023-09-15 RX ADMIN — METHOCARBAMOL 750 MG: 750 TABLET ORAL at 23:12

## 2023-09-15 RX ADMIN — CEFTRIAXONE 1000 MG: 1 INJECTION, POWDER, FOR SOLUTION INTRAMUSCULAR; INTRAVENOUS at 21:37

## 2023-09-15 RX ADMIN — ACETAMINOPHEN 1000 MG: 500 TABLET ORAL at 19:03

## 2023-09-15 RX ADMIN — APIXABAN 2.5 MG: 2.5 TABLET, FILM COATED ORAL at 23:12

## 2023-09-15 RX ADMIN — OXYCODONE HYDROCHLORIDE 5 MG: 5 TABLET ORAL at 19:04

## 2023-09-15 RX ADMIN — AZITHROMYCIN DIHYDRATE 500 MG: 250 TABLET ORAL at 21:18

## 2023-09-15 RX ADMIN — ATORVASTATIN CALCIUM 40 MG: 40 TABLET, FILM COATED ORAL at 23:12

## 2023-09-15 RX ADMIN — SODIUM CHLORIDE: 9 INJECTION, SOLUTION INTRAVENOUS at 23:15

## 2023-09-15 RX ADMIN — SODIUM CHLORIDE, PRESERVATIVE FREE 10 ML: 5 INJECTION INTRAVENOUS at 23:13

## 2023-09-15 ASSESSMENT — ENCOUNTER SYMPTOMS
COUGH: 0
BACK PAIN: 0
DIARRHEA: 0
CONSTIPATION: 0
ABDOMINAL PAIN: 0
EYE ITCHING: 0
EYE PAIN: 0
EYE DISCHARGE: 0
SORE THROAT: 0
WHEEZING: 0
SHORTNESS OF BREATH: 0
NAUSEA: 0
RHINORRHEA: 0

## 2023-09-15 ASSESSMENT — PAIN SCALES - GENERAL
PAINLEVEL_OUTOF10: 5
PAINLEVEL_OUTOF10: 7

## 2023-09-15 ASSESSMENT — PAIN DESCRIPTION - DESCRIPTORS
DESCRIPTORS: ACHING;DISCOMFORT
DESCRIPTORS: DISCOMFORT;SORE;TENDER

## 2023-09-15 ASSESSMENT — LIFESTYLE VARIABLES
HOW OFTEN DO YOU HAVE A DRINK CONTAINING ALCOHOL: NEVER
HOW MANY STANDARD DRINKS CONTAINING ALCOHOL DO YOU HAVE ON A TYPICAL DAY: PATIENT DOES NOT DRINK

## 2023-09-15 ASSESSMENT — PAIN DESCRIPTION - LOCATION
LOCATION: BACK
LOCATION: BACK

## 2023-09-15 ASSESSMENT — PAIN DESCRIPTION - ORIENTATION
ORIENTATION: LOWER;RIGHT
ORIENTATION: LOWER;RIGHT

## 2023-09-15 ASSESSMENT — PAIN DESCRIPTION - PAIN TYPE: TYPE: CHRONIC PAIN

## 2023-09-15 ASSESSMENT — PAIN - FUNCTIONAL ASSESSMENT: PAIN_FUNCTIONAL_ASSESSMENT: ACTIVITIES ARE NOT PREVENTED

## 2023-09-15 ASSESSMENT — PAIN DESCRIPTION - ONSET: ONSET: ON-GOING

## 2023-09-15 ASSESSMENT — PAIN DESCRIPTION - FREQUENCY: FREQUENCY: CONTINUOUS

## 2023-09-15 NOTE — ED PROVIDER NOTES
ED Attending Attestation Note     Date of evaluation: 9/15/2023    This patient was seen by the advance practice provider. I have seen and examined the patient, agree with the workup, evaluation, management and diagnosis. The care plan has been discussed. The patient's clinical history is as follows:    Chief Complaint     Back Pain (Pt. Presents to ED via EMS [facility] with complaints of 5/10 chronic back pain. )      History of Present Illness     Brooklyn Ayon is a 80 y.o. female who presents to the Emergency Department with complaints of right lower back pain has been ongoing over the course the past several days. The patient does not report or family does not know of any recent falls. The pain she is experiencing is sharp moderate to severe in its intensity located in the right lower back nonradiating. Denies any other abdominal pain. Past Medical, Surgical, Family, and Social History     She has a past medical history of Arthritis, CAD (coronary artery disease), CTS (carpal tunnel syndrome), DVT (deep venous thrombosis) ([address]), Hyperlipidemia, Hypertension, Lumbar disc disease, Pacemaker, Psoriasis, Thyroid disease, and Venous stasis. She has a past surgical history that includes Appendectomy; Thyroidectomy, partial; Breast surgery; Pacemaker insertion (2011); Finger trigger release (4/10/2012); joint replacement (2011); joint replacement; Carpal tunnel release (Right, 9/9/14); Colonoscopy (05/04/04); hernia repair (1980); and Colonoscopy. Her family history includes Cancer in her brother; Heart Disease in her brother and father; Stroke in her mother. She reports that she quit smoking about 59 years ago. She has a 1.50 pack-year smoking history. She has never used smokeless tobacco. She reports current alcohol use. She reports that she does not use drugs.       Pertinent Physical Exam Findings     Tenderness palpation in the right lower paraspinal musculature in the lumbar region Mirella Davey MD  09/15/23 8168

## 2023-09-16 ENCOUNTER — APPOINTMENT (OUTPATIENT)
Dept: GENERAL RADIOLOGY | Age: 88
DRG: 551 | End: 2023-09-16
Payer: MEDICARE

## 2023-09-16 PROBLEM — M54.9 INTRACTABLE BACK PAIN: Status: ACTIVE | Noted: 2023-09-16

## 2023-09-16 LAB
ALBUMIN SERPL-MCNC: 2.9 G/DL (ref 3.4–5)
ALBUMIN/GLOB SERPL: 0.7 {RATIO} (ref 1.1–2.2)
ALP SERPL-CCNC: 149 U/L (ref 40–129)
ALT SERPL-CCNC: 28 U/L (ref 10–40)
ANION GAP SERPL CALCULATED.3IONS-SCNC: 11 MMOL/L (ref 3–16)
AST SERPL-CCNC: 43 U/L (ref 15–37)
BASOPHILS # BLD: 0.1 K/UL (ref 0–0.2)
BASOPHILS NFR BLD: 0.8 %
BILIRUB SERPL-MCNC: 0.8 MG/DL (ref 0–1)
BUN SERPL-MCNC: 26 MG/DL (ref 7–20)
CALCIUM SERPL-MCNC: 9.3 MG/DL (ref 8.3–10.6)
CHLORIDE SERPL-SCNC: 93 MMOL/L (ref 99–110)
CO2 SERPL-SCNC: 24 MMOL/L (ref 21–32)
CREAT SERPL-MCNC: 1 MG/DL (ref 0.6–1.2)
DEPRECATED RDW RBC AUTO: 15.5 % (ref 12.4–15.4)
EKG ATRIAL RATE: 61 BPM
EKG DIAGNOSIS: NORMAL
EKG P-R INTERVAL: 264 MS
EKG Q-T INTERVAL: 410 MS
EKG QRS DURATION: 84 MS
EKG QTC CALCULATION (BAZETT): 412 MS
EKG R AXIS: 27 DEGREES
EKG T AXIS: -64 DEGREES
EKG VENTRICULAR RATE: 61 BPM
EOSINOPHIL # BLD: 0.4 K/UL (ref 0–0.6)
EOSINOPHIL NFR BLD: 3.4 %
GFR SERPLBLD CREATININE-BSD FMLA CKD-EPI: 53 ML/MIN/{1.73_M2}
GLUCOSE SERPL-MCNC: 108 MG/DL (ref 70–99)
HCT VFR BLD AUTO: 34.2 % (ref 36–48)
HGB BLD-MCNC: 12 G/DL (ref 12–16)
LYMPHOCYTES # BLD: 1.2 K/UL (ref 1–5.1)
LYMPHOCYTES NFR BLD: 11.1 %
MCH RBC QN AUTO: 30.1 PG (ref 26–34)
MCHC RBC AUTO-ENTMCNC: 35.2 G/DL (ref 31–36)
MCV RBC AUTO: 85.6 FL (ref 80–100)
MONOCYTES # BLD: 0.8 K/UL (ref 0–1.3)
MONOCYTES NFR BLD: 7.9 %
NEUTROPHILS # BLD: 8.1 K/UL (ref 1.7–7.7)
NEUTROPHILS NFR BLD: 76.8 %
PLATELET # BLD AUTO: 287 K/UL (ref 135–450)
PMV BLD AUTO: 8.3 FL (ref 5–10.5)
POTASSIUM SERPL-SCNC: 4.3 MMOL/L (ref 3.5–5.1)
PROT SERPL-MCNC: 6.8 G/DL (ref 6.4–8.2)
RBC # BLD AUTO: 4 M/UL (ref 4–5.2)
SODIUM SERPL-SCNC: 128 MMOL/L (ref 136–145)
TROPONIN, HIGH SENSITIVITY: 21 NG/L (ref 0–14)
TROPONIN, HIGH SENSITIVITY: 22 NG/L (ref 0–14)
TROPONIN, HIGH SENSITIVITY: 22 NG/L (ref 0–14)
TROPONIN, HIGH SENSITIVITY: 23 NG/L (ref 0–14)
TROPONIN, HIGH SENSITIVITY: 25 NG/L (ref 0–14)
WBC # BLD AUTO: 10.6 K/UL (ref 4–11)

## 2023-09-16 PROCEDURE — 1200000000 HC SEMI PRIVATE

## 2023-09-16 PROCEDURE — 85025 COMPLETE CBC W/AUTO DIFF WBC: CPT

## 2023-09-16 PROCEDURE — 80053 COMPREHEN METABOLIC PANEL: CPT

## 2023-09-16 PROCEDURE — 6360000002 HC RX W HCPCS: Performed by: FAMILY MEDICINE

## 2023-09-16 PROCEDURE — 96375 TX/PRO/DX INJ NEW DRUG ADDON: CPT

## 2023-09-16 PROCEDURE — 2580000003 HC RX 258: Performed by: FAMILY MEDICINE

## 2023-09-16 PROCEDURE — 93005 ELECTROCARDIOGRAM TRACING: CPT | Performed by: FAMILY MEDICINE

## 2023-09-16 PROCEDURE — 36415 COLL VENOUS BLD VENIPUNCTURE: CPT

## 2023-09-16 PROCEDURE — 93010 ELECTROCARDIOGRAM REPORT: CPT | Performed by: INTERNAL MEDICINE

## 2023-09-16 PROCEDURE — 71045 X-RAY EXAM CHEST 1 VIEW: CPT

## 2023-09-16 PROCEDURE — 84484 ASSAY OF TROPONIN QUANT: CPT

## 2023-09-16 PROCEDURE — 6370000000 HC RX 637 (ALT 250 FOR IP): Performed by: FAMILY MEDICINE

## 2023-09-16 PROCEDURE — 6360000002 HC RX W HCPCS: Performed by: NURSE PRACTITIONER

## 2023-09-16 PROCEDURE — 6370000000 HC RX 637 (ALT 250 FOR IP): Performed by: NURSE PRACTITIONER

## 2023-09-16 RX ORDER — TRAMADOL HYDROCHLORIDE 50 MG/1
50 TABLET ORAL EVERY 8 HOURS
Status: COMPLETED | OUTPATIENT
Start: 2023-09-16 | End: 2023-09-18

## 2023-09-16 RX ORDER — TIZANIDINE 4 MG/1
2 TABLET ORAL 3 TIMES DAILY
Status: COMPLETED | OUTPATIENT
Start: 2023-09-16 | End: 2023-09-18

## 2023-09-16 RX ORDER — MORPHINE SULFATE 2 MG/ML
1 INJECTION, SOLUTION INTRAMUSCULAR; INTRAVENOUS ONCE
Status: COMPLETED | OUTPATIENT
Start: 2023-09-16 | End: 2023-09-16

## 2023-09-16 RX ORDER — DEXAMETHASONE SODIUM PHOSPHATE 4 MG/ML
10 INJECTION, SOLUTION INTRA-ARTICULAR; INTRALESIONAL; INTRAMUSCULAR; INTRAVENOUS; SOFT TISSUE ONCE
Status: CANCELLED | OUTPATIENT
Start: 2023-09-16

## 2023-09-16 RX ADMIN — TIZANIDINE 2 MG: 4 TABLET ORAL at 20:27

## 2023-09-16 RX ADMIN — TIZANIDINE 2 MG: 4 TABLET ORAL at 14:45

## 2023-09-16 RX ADMIN — METHOCARBAMOL 750 MG: 750 TABLET ORAL at 16:16

## 2023-09-16 RX ADMIN — SODIUM CHLORIDE, PRESERVATIVE FREE 10 ML: 5 INJECTION INTRAVENOUS at 20:28

## 2023-09-16 RX ADMIN — ATORVASTATIN CALCIUM 40 MG: 40 TABLET, FILM COATED ORAL at 20:27

## 2023-09-16 RX ADMIN — MORPHINE SULFATE 1 MG: 2 INJECTION, SOLUTION INTRAMUSCULAR; INTRAVENOUS at 11:26

## 2023-09-16 RX ADMIN — SODIUM CHLORIDE, PRESERVATIVE FREE 10 ML: 5 INJECTION INTRAVENOUS at 08:35

## 2023-09-16 RX ADMIN — SODIUM CHLORIDE: 9 INJECTION, SOLUTION INTRAVENOUS at 23:22

## 2023-09-16 RX ADMIN — METHOCARBAMOL 750 MG: 750 TABLET ORAL at 20:26

## 2023-09-16 RX ADMIN — TRAMADOL HYDROCHLORIDE 50 MG: 50 TABLET, COATED ORAL at 18:23

## 2023-09-16 RX ADMIN — METHOCARBAMOL 750 MG: 750 TABLET ORAL at 08:33

## 2023-09-16 RX ADMIN — METHOCARBAMOL 750 MG: 750 TABLET ORAL at 12:37

## 2023-09-16 RX ADMIN — AZITHROMYCIN MONOHYDRATE 500 MG: 250 TABLET ORAL at 20:25

## 2023-09-16 RX ADMIN — TRAMADOL HYDROCHLORIDE 50 MG: 50 TABLET, COATED ORAL at 11:26

## 2023-09-16 RX ADMIN — CEFTRIAXONE 1000 MG: 1 INJECTION, POWDER, FOR SOLUTION INTRAMUSCULAR; INTRAVENOUS at 22:05

## 2023-09-16 RX ADMIN — CLOPIDOGREL BISULFATE 75 MG: 75 TABLET ORAL at 08:33

## 2023-09-16 RX ADMIN — APIXABAN 2.5 MG: 2.5 TABLET, FILM COATED ORAL at 20:25

## 2023-09-16 RX ADMIN — APIXABAN 2.5 MG: 2.5 TABLET, FILM COATED ORAL at 08:33

## 2023-09-16 RX ADMIN — METOPROLOL SUCCINATE 12.5 MG: 25 TABLET, EXTENDED RELEASE ORAL at 08:33

## 2023-09-16 RX ADMIN — LEVOTHYROXINE SODIUM 50 MCG: 50 TABLET ORAL at 05:34

## 2023-09-16 ASSESSMENT — PAIN SCALES - GENERAL
PAINLEVEL_OUTOF10: 7
PAINLEVEL_OUTOF10: 6
PAINLEVEL_OUTOF10: 7
PAINLEVEL_OUTOF10: 7
PAINLEVEL_OUTOF10: 0
PAINLEVEL_OUTOF10: 7
PAINLEVEL_OUTOF10: 0

## 2023-09-16 ASSESSMENT — PAIN DESCRIPTION - FREQUENCY
FREQUENCY: CONTINUOUS

## 2023-09-16 ASSESSMENT — PAIN DESCRIPTION - PAIN TYPE
TYPE: CHRONIC PAIN

## 2023-09-16 ASSESSMENT — PAIN DESCRIPTION - ONSET
ONSET: ON-GOING

## 2023-09-16 ASSESSMENT — PAIN DESCRIPTION - ORIENTATION
ORIENTATION: LOWER

## 2023-09-16 ASSESSMENT — PAIN - FUNCTIONAL ASSESSMENT
PAIN_FUNCTIONAL_ASSESSMENT: PREVENTS OR INTERFERES SOME ACTIVE ACTIVITIES AND ADLS

## 2023-09-16 ASSESSMENT — PAIN DESCRIPTION - DESCRIPTORS
DESCRIPTORS: DISCOMFORT

## 2023-09-16 ASSESSMENT — PAIN DESCRIPTION - LOCATION
LOCATION: BACK

## 2023-09-16 NOTE — PROGRESS NOTES
V2.0    [facility] [place]      Name:  Saud Pierson /Age/Sex: 1932  (80 y.o. female)   MRN & CSN:  [id number] & [id number] Encounter Date/Time: 2023 12:47 PM EDT   Location:  [address] [place] [address] PCP: Willa Dejesus MD     Attending:Sean Dia MD       Hospital Day: 2    Assessment and Recommendations   Saud Pierson is a 80 y.o. female with pmh of arthritis, coronary artery disease, carpal tunnel syndrome, DVT, hypertension, hyperlipidemia, lumbar disc disease, thyroid disease, venous stasis, sick sinus syndrome with PPM and recent admission for diplopia concerning of left internuclear ophthalmoplegia suggestive of pontine infarct who presents with Pneumonia, unspecified organism also complaining of intractable low back pain worsening over the week      Plan:   Low back pain-CT lumbar spine with no evidence of acute fracture; severe multilevel DDD noted; moderate L4, L5 and CVL L5 foraminal narrowing noted; continue pain management; PT OT; muscle  relaxant  Community-acquired pneumonia follow-up Pro-Heriberto; continue on Rocephin and azithromycin and de-escalate; blood cultures; IVF  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; per neurology note on  continue Plavix for 21 days; then continue statin; okay to DC Plavix continue aspirin 81 mg statin and Eliquis  A-fib-rate controlled  Hypothyroidism continue levothyroxine      Diet ADULT DIET; Regular; 4 carb choices (60 gm/meal);  Low Fat/Low Chol/High Fiber/2 gm Na   DVT Prophylaxis [] Lovenox, []  Heparin, [] SCDs, [] Ambulation,  [] Eliquis, [] Xarelto  [] Coumadin   Code Status Full Code   Disposition From: AL  Expected Disposition: SNF  Estimated Date of Discharge: 1-2  Patient requires continued admission due to pneumonia and intractable back pain   Surrogate Decision Maker/ POA  daughter     Personally reviewed Lab Studies and Imaging     Discussed management of the case with attending who recommended above POC    EKG interpreted personally and results sodium chloride 75 mL/hr at 09/15/23 2315     PRN Meds: sodium chloride flush, 5-40 mL, PRN  sodium chloride, , PRN  ondansetron, 4 mg, Q8H PRN   Or  ondansetron, 4 mg, Q6H PRN  polyethylene glycol, 17 g, Daily PRN  acetaminophen, 650 mg, Q6H PRN   Or  acetaminophen, 650 mg, Q6H PRN  benzonatate, 100 mg, TID PRN  HYDROcodone 5 mg - acetaminophen, 1 tablet, Q6H PRN        Labs and Imaging   XR CHEST PORTABLE    Result Date: 9/16/2023  Reason: Low back pain, shortness of breath AP chest FINDINGS: Comparison made to a PA chest September 15, 2023. Increased consolidative change identified medial aspect right lower lung and mid right upper lung. No large effusions. Stable bipolar pacemaker positioning. Possible progressive pneumonic infiltrates right upper lung right lower lung    XR CHEST (2 VW)    Result Date: 9/15/2023  EXAM: XR CHEST (2 VW) INDICATION: PNA on CT a/p COMPARISON: September 15, 2023 FINDINGS: Medical Devices: Left subclavian pacemaker. Lungs: Mild airspace consolidation in the medial right lower lung zone. Heart and Mediastinum: No cardiomegaly. Calcifications in the aorta. Other: N/A. Mild right lower lung zone pneumonia. CT ABDOMEN PELVIS W IV CONTRAST Additional Contrast? None    Result Date: 9/15/2023  EXAM: CT ABDOMEN PELVIS W IV CONTRAST INDICATION: right back pain/flank COMPARISON: None TECHNIQUE: Standard per department protocol following the administration of 75 mL Isovue-370 intravenous contrast.. Up-to-date CT equipment and radiation dose reduction techniques were employed. FINDINGS: Lung bases: Trace right pleural effusion. Mild dependent airspace consolidation in the bilateral lower lobes. Mild vague patchy groundglass opacity in the right lower lobe. Mediastinum: Normal. Liver and biliary system: Normal. Pancreas: Normal. Spleen: Normal. Adrenal glands: Normal. Genitourinary: Diffuse renal parenchymal thinning with multifocal scarring. No mass or calculus.  No

## 2023-09-16 NOTE — ED NOTES
ED TO INPATIENT SBAR HANDOFF    Patient Name: Brooklyn Ayon   :  1932  80 y.o. MRN:  [id number]  Preferred Name    ED Room #:  A07/A07-07  Family/Caregiver Present no   Restraints no   Sitter no   Sepsis Risk Score Sepsis Risk Score: 2.04    Situation  Code Status: Full Code No additional code details. Allergies: No known allergies  Weight: Patient Vitals for the past 96 hrs (Last 3 readings):   Weight   09/15/23 1803 149 lb 11.2 oz (67.9 kg)     Arrived from: nursing home  Chief Complaint:   Chief Complaint   Patient presents with    Back Pain     Pt. Presents to ED via EMS from [facility] [place] with complaints of 5/10 chronic back pain. Hospital Problem/Diagnosis:  Principal Problem:    Pneumonia, unspecified organism  Resolved Problems:    * No resolved hospital problems. *    Imaging:   XR CHEST (2 VW)   Final Result      Mild right lower lung zone pneumonia. CT ABDOMEN PELVIS W IV CONTRAST Additional Contrast? None   Final Result      1. No acute abnormality. 2. Mild airspace disease in the bilateral lower lobes and the trace right   pleural effusion. 3. Multifocal scarring and parenchymal thinning in the kidneys. 4. Severe vascular calcifications without aneurysm. CT LUMBAR RECONSTRUCTION WO POST PROCESS   Final Result      1. No evidence of a an acute fracture or significant malalignment. 2. Severe multilevel degenerative disc disease without significant spinal   stenosis. 3. Moderate bilateral L4, moderate right L5, and severe left L5 foraminal   stenosis.    4. Levoscoliosis      XR CHEST PORTABLE    (Results Pending)     Abnormal labs:   Abnormal Labs Reviewed   CBC WITH AUTO DIFFERENTIAL - Abnormal; Notable for the following components:       Result Value    Neutrophils Absolute 7.9 (*)     Lymphocytes Absolute 0.6 (*)     All other components within normal limits   COMPREHENSIVE METABOLIC PANEL W/ REFLEX TO MG FOR LOW K - Abnormal; Notable for the following components:    Sodium 129 (*)     Chloride 92 (*)     Glucose 118 (*)     BUN 23 (*)     Albumin 3.3 (*)     Albumin/Globulin Ratio 0.8 (*)     Alkaline Phosphatase 168 (*)     AST 52 (*)     All other components within normal limits     Critical values:      Abnormal Assessment Findings:    -Acute right-sided low back pain without sciatica  -Dehydration  -Pneumonia of both lower lobes due to infectious organism  -Hyponatremia  -Degenerative disc disease, lumbar        Background  History:   Past Medical History:   Diagnosis Date    Arthritis     CAD (coronary artery disease)     CTS (carpal tunnel syndrome)     DVT (deep venous thrombosis) ([address]) 1966    post partum    Hyperlipidemia     Hypertension     Lumbar disc disease     Pacemaker     Psoriasis     Thyroid disease     hypothyroid    Venous stasis        Assessment    Vitals/MEWS: MEWS Score: 1  Level of Consciousness: Alert (0)   Vitals:    09/15/23 1830 09/15/23 1900 09/15/23 1915 09/15/23 1933   BP: (!) 123/57 (!) 120/44 (!) 119/50 (!) 148/53   Pulse:   59    Resp:   16    Temp:       TempSrc:       SpO2: 92% 94% 95%    Weight:         FiO2 (%):   O2 Flow Rate: O2 Device: None (Room air)    Cardiac Rhythm:    Pain Assessment:  [x] Verbal [] [place] Scale  Pain Scale: Pain Assessment  Pain Level: 5  Patient's Stated Pain Goal: 0 - No pain  Pain Location: Back  Pain Orientation: Lower, Right  Pain Descriptors: Discomfort, Sore, Tender  Last documented pain score (0-10 scale) Pain Level: 5  Last documented pain medication administered:   5mg oxycodone @ 1904  1,000mg Tylenol @ 1903  Mental Status: alert  Orientation Level:    NIH Score:    C-SSRS: Risk of Suicide: No Risk  Bedside swallow:    [place] Coma Scale (GCS): Jose Rafael Coma Scale  Eye Opening: Spontaneous  Best Verbal Response: Oriented  Best Motor Response: Obeys commands  [place] Coma Scale Score: 15  Active LDA's:   Peripheral IV 09/15/23 Right Antecubital (Active)   Site Assessment Clean, dry &

## 2023-09-16 NOTE — PLAN OF CARE
Safety - Adult  Goal: Free from fall injury  Outcome: Progressing  Note: All fall precautions are in place. Per family, PT was ambulating with a walker before this admission. Pain  Goal: Verbalizes/displays adequate comfort level or baseline comfort level  Outcome: Progressing  Note: Administering pain medications as prescribed.

## 2023-09-16 NOTE — PROGRESS NOTES
PT has been oriented to self, situation, and time this shift. She continues to have complaints of pain with movement and states that her pain is really bad. PT is voiding adequately, all fall precautions are in place.

## 2023-09-16 NOTE — PROGRESS NOTES
Pt is Admitted to room . Alert to self only. Stable vitals on RA endorsing back pain managing per MAR. Voiding adequately using purewick. All fall precaution are in place. call light within a reach. bed is lowest position. Bed Alarm is on for safety. Will continue monitor . Luly Chen ...

## 2023-09-16 NOTE — PROGRESS NOTES
4 Eyes Skin Assessment     NAME:  Manny Hodge  YOB: 1932  MEDICAL RECORD NUMBER:  [id number]    The patient is being assessed for  Admission    I agree that at least one RN has performed a thorough Head to Toe Skin Assessment on the patient. ALL assessment sites listed below have been assessed. Areas assessed by both nurses:    Head, Face, Ears, Shoulders, Back, Chest, Arms, Elbows, Hands, Sacrum. Buttock, Coccyx, Ischium, Legs. Feet and Heels, Under Medical Devices , and Other          Does the Patient have a Wound? Scattered scabs.         Cash Prevention initiated by RN: Yes  Wound Care Orders initiated by RN: NO    Pressure Injury (Stage 3,4, Unstageable, DTI, NWPT, and Complex wounds) if present, place Wound referral order by RN under : NO    New Ostomies, if present place, Ostomy referral order under : No     Nurse 1 eSignature: Electronically signed by Desean Aviles RN on 9/16/23 at 2:18 AM EDT    **SHARE this note so that the co-signing nurse can place an eSignature**    Nurse 2 eSignature: Electronically signed by Rayne Vences RN on 9/16/23 at 7:41 AM EDT

## 2023-09-16 NOTE — H&P
Hospital Medicine History & Physical      Date of Admission: 9/15/2023    Date of Service:  Pt seen/examined on 9/15/2023     []Admitted to Inpatient with expected LOS greater than two midnights due to medical therapy. [x]Placed in Observation status. Chief Admission Complaint:  Right Low back pain    Presenting Admission History:      80 y.o. female who presented to [facility] with right low back pain. PMHx significant for recent stroke on eliquis . Symptoms started suddenly on Monday with low midline back pain. Progressed to right sided back pain, not getting better and cannot ambulate secondary to pain. Denies leg pain or numbness. Denies loss f bladder function or BM. Denies chest pain pressure, abd pain/distention, fevers, chills. Reports cough. Is receiving PT at nursing facility. Denies recent falls    Has pacemaker with leads that are incompatible with mri. In the ER   CT lumbar spine shows No evidence of a an acute fracture or significant malalignment./Severe multilevel degenerative disc disease without significant spinal stenosis./Moderate bilateral L4, moderate right L5, and severe left L5 foraminal stenosis. /Levoscoliosis    CT abd shows . No acute abnormality./Mild airspace disease in the bilateral lower lobes and the trace right pleural effusion./Multifocal scarring and parenchymal thinning in the kidneys./Severe vascular calcifications without aneurysm.     Na 129  BUN 23  Cr 0.9  Alk Phos 168  AST 52    ER treated for pneumonia and dehydration  Assessment/Plan:      Current Principal Problem:  Pneumonia, unspecified organism    Back pain/dehydration/pneumonia  - cont rocephin azithromycin  - f/u procal, if negative consider deescalating antibiotics  - blood cultures  - pain control  - IVFs  - trend troponin  -EKG    S/p stroke  - cont Plavix x 3 days  - cont eliquis  - cont statin    Cont home synthroid metoprolol      Discussed management of the case in detail w/ the Emergency

## 2023-09-16 NOTE — PLAN OF CARE
Problem: ABCDS Injury Assessment  Goal: Absence of physical injury  Outcome: Progressing  Note: Pt free from physical injury. Flowsheets (Taken 9/16/2023 1937)  Absence of Physical Injury: Implement safety measures based on patient assessment     Problem: Safety - Adult  Goal: Free from fall injury  9/16/2023 1940 by Rod Stevens RN  Outcome: Progressing  Note: All standard safety precautions in place, call light within reach, no fall injury during this shift. Flowsheets (Taken 9/16/2023 1937)  Free From Fall Injury: Instruct family/caregiver on patient safety     Problem: Pain  Goal: Verbalizes/displays adequate comfort level or baseline comfort level  9/16/2023 1940 by Rod Stevens RN  Outcome: Progressing  Note: Numeric pain rating scale used for assessment. Pain managed with MAR. Problem: Discharge Planning  Goal: Discharge to home or other facility with appropriate resources  Outcome: Progressing  Note: CM is following for discharge.

## 2023-09-17 LAB
ALBUMIN SERPL-MCNC: 2.4 G/DL (ref 3.4–5)
ALBUMIN/GLOB SERPL: 0.6 {RATIO} (ref 1.1–2.2)
ALP SERPL-CCNC: 132 U/L (ref 40–129)
ALT SERPL-CCNC: 22 U/L (ref 10–40)
ANION GAP SERPL CALCULATED.3IONS-SCNC: 13 MMOL/L (ref 3–16)
AST SERPL-CCNC: 32 U/L (ref 15–37)
BASOPHILS # BLD: 0.1 K/UL (ref 0–0.2)
BASOPHILS # BLD: 0.1 K/UL (ref 0–0.2)
BASOPHILS NFR BLD: 0.4 %
BASOPHILS NFR BLD: 0.6 %
BILIRUB SERPL-MCNC: 0.8 MG/DL (ref 0–1)
BUN SERPL-MCNC: 20 MG/DL (ref 7–20)
CALCIUM SERPL-MCNC: 8.8 MG/DL (ref 8.3–10.6)
CHLORIDE SERPL-SCNC: 98 MMOL/L (ref 99–110)
CO2 SERPL-SCNC: 18 MMOL/L (ref 21–32)
CREAT SERPL-MCNC: 0.9 MG/DL (ref 0.6–1.2)
DEPRECATED RDW RBC AUTO: 15 % (ref 12.4–15.4)
DEPRECATED RDW RBC AUTO: 15.7 % (ref 12.4–15.4)
EOSINOPHIL # BLD: 0.1 K/UL (ref 0–0.6)
EOSINOPHIL # BLD: 0.3 K/UL (ref 0–0.6)
EOSINOPHIL NFR BLD: 0.9 %
EOSINOPHIL NFR BLD: 3.2 %
GFR SERPLBLD CREATININE-BSD FMLA CKD-EPI: >60 ML/MIN/{1.73_M2}
GLUCOSE SERPL-MCNC: 180 MG/DL (ref 70–99)
HCT VFR BLD AUTO: 26.2 % (ref 36–48)
HCT VFR BLD AUTO: 30.7 % (ref 36–48)
HGB BLD-MCNC: 10.9 G/DL (ref 12–16)
HGB BLD-MCNC: 9.3 G/DL (ref 12–16)
LYMPHOCYTES # BLD: 0.6 K/UL (ref 1–5.1)
LYMPHOCYTES # BLD: 0.9 K/UL (ref 1–5.1)
LYMPHOCYTES NFR BLD: 5.5 %
LYMPHOCYTES NFR BLD: 9.6 %
MCH RBC QN AUTO: 30.2 PG (ref 26–34)
MCH RBC QN AUTO: 30.4 PG (ref 26–34)
MCHC RBC AUTO-ENTMCNC: 35.4 G/DL (ref 31–36)
MCHC RBC AUTO-ENTMCNC: 35.7 G/DL (ref 31–36)
MCV RBC AUTO: 85.2 FL (ref 80–100)
MCV RBC AUTO: 85.5 FL (ref 80–100)
MONOCYTES # BLD: 0.8 K/UL (ref 0–1.3)
MONOCYTES # BLD: 1 K/UL (ref 0–1.3)
MONOCYTES NFR BLD: 10.5 %
MONOCYTES NFR BLD: 7 %
NEUTROPHILS # BLD: 7.2 K/UL (ref 1.7–7.7)
NEUTROPHILS # BLD: 9.9 K/UL (ref 1.7–7.7)
NEUTROPHILS NFR BLD: 76.1 %
NEUTROPHILS NFR BLD: 86.2 %
PLATELET # BLD AUTO: 270 K/UL (ref 135–450)
PLATELET # BLD AUTO: 291 K/UL (ref 135–450)
PMV BLD AUTO: 8.1 FL (ref 5–10.5)
PMV BLD AUTO: 8.4 FL (ref 5–10.5)
POTASSIUM SERPL-SCNC: 4.1 MMOL/L (ref 3.5–5.1)
PROT SERPL-MCNC: 6.3 G/DL (ref 6.4–8.2)
RBC # BLD AUTO: 3.08 M/UL (ref 4–5.2)
RBC # BLD AUTO: 3.59 M/UL (ref 4–5.2)
SODIUM SERPL-SCNC: 129 MMOL/L (ref 136–145)
TROPONIN, HIGH SENSITIVITY: 26 NG/L (ref 0–14)
WBC # BLD AUTO: 11.5 K/UL (ref 4–11)
WBC # BLD AUTO: 9.5 K/UL (ref 4–11)

## 2023-09-17 PROCEDURE — 84484 ASSAY OF TROPONIN QUANT: CPT

## 2023-09-17 PROCEDURE — 97530 THERAPEUTIC ACTIVITIES: CPT

## 2023-09-17 PROCEDURE — 85025 COMPLETE CBC W/AUTO DIFF WBC: CPT

## 2023-09-17 PROCEDURE — 6360000002 HC RX W HCPCS: Performed by: FAMILY MEDICINE

## 2023-09-17 PROCEDURE — 2580000003 HC RX 258: Performed by: FAMILY MEDICINE

## 2023-09-17 PROCEDURE — 36415 COLL VENOUS BLD VENIPUNCTURE: CPT

## 2023-09-17 PROCEDURE — 97535 SELF CARE MNGMENT TRAINING: CPT

## 2023-09-17 PROCEDURE — 6370000000 HC RX 637 (ALT 250 FOR IP): Performed by: FAMILY MEDICINE

## 2023-09-17 PROCEDURE — 1200000000 HC SEMI PRIVATE

## 2023-09-17 PROCEDURE — 80053 COMPREHEN METABOLIC PANEL: CPT

## 2023-09-17 PROCEDURE — 97167 OT EVAL HIGH COMPLEX 60 MIN: CPT

## 2023-09-17 PROCEDURE — 97162 PT EVAL MOD COMPLEX 30 MIN: CPT

## 2023-09-17 PROCEDURE — 97116 GAIT TRAINING THERAPY: CPT

## 2023-09-17 PROCEDURE — 6370000000 HC RX 637 (ALT 250 FOR IP): Performed by: NURSE PRACTITIONER

## 2023-09-17 RX ORDER — LIDOCAINE 4 G/G
1 PATCH TOPICAL DAILY
Status: DISCONTINUED | OUTPATIENT
Start: 2023-09-17 | End: 2023-09-19 | Stop reason: HOSPADM

## 2023-09-17 RX ADMIN — APIXABAN 2.5 MG: 2.5 TABLET, FILM COATED ORAL at 10:04

## 2023-09-17 RX ADMIN — TIZANIDINE 2 MG: 4 TABLET ORAL at 20:12

## 2023-09-17 RX ADMIN — TIZANIDINE 2 MG: 4 TABLET ORAL at 14:11

## 2023-09-17 RX ADMIN — METOPROLOL SUCCINATE 12.5 MG: 25 TABLET, EXTENDED RELEASE ORAL at 10:03

## 2023-09-17 RX ADMIN — TRAMADOL HYDROCHLORIDE 50 MG: 50 TABLET, COATED ORAL at 18:46

## 2023-09-17 RX ADMIN — CEFTRIAXONE 1000 MG: 1 INJECTION, POWDER, FOR SOLUTION INTRAMUSCULAR; INTRAVENOUS at 21:43

## 2023-09-17 RX ADMIN — METHOCARBAMOL 750 MG: 750 TABLET ORAL at 14:11

## 2023-09-17 RX ADMIN — SODIUM CHLORIDE, PRESERVATIVE FREE 10 ML: 5 INJECTION INTRAVENOUS at 20:24

## 2023-09-17 RX ADMIN — TRAMADOL HYDROCHLORIDE 50 MG: 50 TABLET, COATED ORAL at 10:04

## 2023-09-17 RX ADMIN — TIZANIDINE 2 MG: 4 TABLET ORAL at 10:04

## 2023-09-17 RX ADMIN — TRAMADOL HYDROCHLORIDE 50 MG: 50 TABLET, COATED ORAL at 03:36

## 2023-09-17 RX ADMIN — ATORVASTATIN CALCIUM 40 MG: 40 TABLET, FILM COATED ORAL at 20:12

## 2023-09-17 RX ADMIN — AZITHROMYCIN MONOHYDRATE 500 MG: 250 TABLET ORAL at 20:13

## 2023-09-17 RX ADMIN — ACETAMINOPHEN 650 MG: 325 TABLET ORAL at 20:13

## 2023-09-17 RX ADMIN — APIXABAN 2.5 MG: 2.5 TABLET, FILM COATED ORAL at 20:12

## 2023-09-17 RX ADMIN — LEVOTHYROXINE SODIUM 50 MCG: 50 TABLET ORAL at 06:02

## 2023-09-17 RX ADMIN — HYDROCODONE BITARTRATE AND ACETAMINOPHEN 1 TABLET: 5; 325 TABLET ORAL at 07:32

## 2023-09-17 RX ADMIN — METHOCARBAMOL 750 MG: 750 TABLET ORAL at 10:04

## 2023-09-17 ASSESSMENT — PAIN SCALES - GENERAL
PAINLEVEL_OUTOF10: 5
PAINLEVEL_OUTOF10: 3
PAINLEVEL_OUTOF10: 6
PAINLEVEL_OUTOF10: 3
PAINLEVEL_OUTOF10: 4
PAINLEVEL_OUTOF10: 5
PAINLEVEL_OUTOF10: 1
PAINLEVEL_OUTOF10: 8
PAINLEVEL_OUTOF10: 7
PAINLEVEL_OUTOF10: 5

## 2023-09-17 ASSESSMENT — PAIN DESCRIPTION - DESCRIPTORS
DESCRIPTORS: DISCOMFORT
DESCRIPTORS: ACHING

## 2023-09-17 ASSESSMENT — PAIN - FUNCTIONAL ASSESSMENT
PAIN_FUNCTIONAL_ASSESSMENT: ACTIVITIES ARE NOT PREVENTED
PAIN_FUNCTIONAL_ASSESSMENT: PREVENTS OR INTERFERES SOME ACTIVE ACTIVITIES AND ADLS
PAIN_FUNCTIONAL_ASSESSMENT: ACTIVITIES ARE NOT PREVENTED

## 2023-09-17 ASSESSMENT — PAIN DESCRIPTION - LOCATION
LOCATION: BACK

## 2023-09-17 ASSESSMENT — PAIN DESCRIPTION - ORIENTATION
ORIENTATION: MID;LOWER
ORIENTATION: LOWER;POSTERIOR
ORIENTATION: LEFT;RIGHT;LOWER
ORIENTATION: LOWER;POSTERIOR
ORIENTATION: LOWER

## 2023-09-17 ASSESSMENT — PAIN DESCRIPTION - ONSET
ONSET: ON-GOING

## 2023-09-17 ASSESSMENT — PAIN DESCRIPTION - PAIN TYPE
TYPE: CHRONIC PAIN

## 2023-09-17 ASSESSMENT — PAIN DESCRIPTION - FREQUENCY
FREQUENCY: CONTINUOUS

## 2023-09-17 NOTE — PROGRESS NOTES
PT alert and oriented to self, oriented/ disoriented to time, place and situation so re- orientation given. VS taken and recorded. SPO2 maintained at RA. No any acute changes noted. Is on regular diet and tolerating well. Pt voiding adequately with pure wick. No BM during this shift. All standard safety precautions in place, call light within reach, no fall injury during this shift. Plan of care ongoing.

## 2023-09-17 NOTE — PROGRESS NOTES
Patient is alert and oriented to self; intermittently to place, time, and situation. VSS, on room air. Lower back pain controlled with PRN Norco and scheduled tizanidine and tramadol. No acute neuro changes. Tolerating diet well, needs assist with meals. Voiding without complications via purewick. Ambulating x1-2 assist with walker and gait belt. Fall precautions in place. Bed locked in lowest position with alarm on. Call light within reach. Hourly rounding by RN. Plan of care continues.

## 2023-09-17 NOTE — PLAN OF CARE
Problem: Safety - Adult  Goal: Free from fall injury  Outcome: Progressing  Note: Fall precautions in place. Non-skid socks on. Bed locked in lowest position with alarm on and side rails up. Bedside table, belongings, and call light within reach. Hourly rounding by RN in anticipation of patient needs. Floor clean and free from clutter. Room door open. Plan of care continues. Problem: Pain  Goal: Verbalizes/displays adequate comfort level or baseline comfort level  Outcome: Progressing  Note: Endorsing pain to mid/lower back. Using numerical pain rating scale. PRN tylenol and Norco as well as scheduled robaxin, Zanaflex, and tramadol administered per the [facility]. Patient educated on medications, side effects, and verbalized understanding. Assisted with turning and repositioning in bed/chair for comfort and pressure relief. Notified of pain medication administration schedule. Plan of care continues. Problem: Skin/Tissue Integrity  Goal: Absence of new skin breakdown  Description: 1. Monitor for areas of redness and/or skin breakdown  2. Assess vascular access sites hourly  3. Every 4-6 hours minimum:  Change oxygen saturation probe site  4. Every 4-6 hours:  If on nasal continuous positive airway pressure, respiratory therapy assess nares and determine need for appliance change or resting period. Outcome: Progressing  Note: Assisted with q2 turning and repositioning. Mepilex in place to coccyx. Heels and elbows elevated off bed surface on pillows. Specialty mattress ordered. Plan of care continues.

## 2023-09-17 NOTE — PROGRESS NOTES
V2.0    [facility] [place]      Name:  Tracey Gil /Age/Sex: 1932  (80 y.o. female)   MRN & CSN:  [id number] & [id number] Encounter Date/Time: 2023 12:47 PM EDT   [facility]  [facility] PCP: Summer Goodwin MD     Attending:Fahad Lisa Alpers, MD       Hospital Day: 3    Assessment and Recommendations   Tracey Gil is a 80 y.o. female with pmh of arthritis, coronary artery disease, carpal tunnel syndrome, DVT, hypertension, hyperlipidemia, lumbar disc disease, thyroid disease, venous stasis, sick sinus syndrome with PPM and recent admission for diplopia concerning of left internuclear ophthalmoplegia suggestive of pontine infarct who presents with Pneumonia, unspecified organism also complaining of intractable low back pain worsening over the week      Plan:   Low back pain-CT lumbar spine with no evidence of acute fracture; severe multilevel DDD noted; moderate L4, L5 and CVL L5 foraminal narrowing noted; continue pain management; PT OT; muscle  relaxant; Community-acquired pneumonia follow-up Pro-Heriberto; continue on Rocephin and azithromycin and de-escalate; blood cultures; repeat Pro-Heriberto in a.m. 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; per neurology note on  continue Plavix for 21 days; then continue statin; okay to DC Plavix continue aspirin 81 mg statin and Eliquis  A-fib-rate controlled  Hypothyroidism continue levothyroxine      Diet ADULT DIET; Regular; 4 carb choices (60 gm/meal);  Low Fat/Low Chol/High Fiber/2 gm Na   DVT Prophylaxis [] Lovenox, []  Heparin, [] SCDs, [] Ambulation,  [] Eliquis, [] Xarelto  [] Coumadin   Code Status Full Code   Disposition From: AL  Expected Disposition: SNF  Estimated Date of Discharge: 1-2  Patient requires continued admission due to pneumonia and intractable back pain   Surrogate Decision Maker/ POA  daughter     Personally reviewed Lab Studies and Imaging     Discussed management of the case with attending who recommended above POC    EKG

## 2023-09-17 NOTE — PROGRESS NOTES
Physical Therapy  Facility/Department: [address] [place] [address]   Physical Therapy Initial Assessment/Treatment    Name: Manny Hodge  : 1932  MRN: [id number]  Date of Service: 2023    Discharge Recommendations:    Manny Hodge scored a 15/24 on the AM-PAC short mobility form. Current research shows that an AM-PAC score of 17 or less is typically not associated with a discharge to the patient's home setting. Based on the patient's AM-PAC score and their current functional mobility deficits, it is recommended that the patient have 3-5 sessions per week of Physical Therapy at d/c to increase the patient's independence. Please see assessment section for further patient specific details. If patient discharges prior to next session this note will serve as a discharge summary. Please see below for the latest assessment towards goals. PT Equipment Recommendations  Equipment Needed: No (defer)      Patient Diagnosis(es): The primary encounter diagnosis was Acute right-sided low back pain without sciatica. Diagnoses of Dehydration, Pneumonia of both lower lobes due to infectious organism, Hyponatremia, and Degenerative disc disease, lumbar were also pertinent to this visit. Past Medical History:  has a past medical history of Arthritis, CAD (coronary artery disease), CTS (carpal tunnel syndrome), DVT (deep venous thrombosis) ([address]), Hyperlipidemia, Hypertension, Lumbar disc disease, Pacemaker, Psoriasis, Thyroid disease, and Venous stasis. Past Surgical History:  has a past surgical history that includes Appendectomy; Thyroidectomy, partial; Breast surgery; Pacemaker insertion (); Finger trigger release (4/10/2012); joint replacement (); joint replacement; Carpal tunnel release (Right, 14); Colonoscopy (04); hernia repair (); and Colonoscopy.     Assessment   Body Structures, Functions, Activity Limitations Requiring Skilled Therapeutic Intervention: Decreased functional mobility Follows one step commands with repetition; Follows one step commands with increased time  Memory: Decreased short term memory;Decreased recall of recent events  Problem Solving: Decreased awareness of errors  Insights: Decreased awareness of deficits  Initiation: Requires cues for some     Objective   Pulse: 64  Heart Rate Source: Monitor  BP: (!) 142/68  BP Location: Left upper arm  BP Method: Automatic  Patient Position: Up in chair;Sitting  MAP (Calculated): 93  Respirations: 16  SpO2: 96 %  O2 Device: None (Room air)              AROM RLE (degrees)  RLE AROM: WFL  AROM LLE (degrees)  LLE AROM : WFL  Strength RLE  Strength RLE: WFL  Strength LLE  Strength LLE: WFL           Bed mobility  Supine to Sit: Moderate assistance (via log roll, used rail, HOB raised)  Transfers  Sit to Stand: Minimal Assistance (from EOB, mod A from toilet)  Stand to Sit: Minimal Assistance (heavy VC to sit 2/2 anxiety of fall.)  Ambulation  Surface: Level tile  Device: Rolling Walker  Assistance: Minimal assistance  Quality of Gait: slow and effortful, unsteady at times but no overt LOB  Distance: 10'+20'     Balance  Posture: Fair  Sitting - Static: Good  Sitting - Dynamic: Good  Standing - Static: Fair  Standing - Dynamic: Fair           AM-PAC Score  AM-PAC Inpatient Mobility Raw Score : 15 (09/17/23 1214)  AM-PAC Inpatient T-Scale Score : 39.45 (09/17/23 1214)  Mobility Inpatient CMS 0-100% Score: 57.7 (09/17/23 1214)  Mobility Inpatient CMS G-Code Modifier : CK (09/17/23 1214)        Goals  Short Term Goals  Time Frame for Short Term Goals: d/c  Short Term Goal 1: sup<>sit supervision  Short Term Goal 2: sit<>stand SBA  Short Term Goal 3: amb 48' with RW and CGA  Patient Goals   Patient Goals : none stated       Education  Patient Education  Education Given To: Patient  Education Provided: Role of Therapy  Education Method: Demonstration  Barriers to Learning: None  Education Outcome: Verbalized understanding;Continued education

## 2023-09-17 NOTE — PROGRESS NOTES
Occupational Therapy  Facility/Department: [facility] 5T ORTHO/NEURO  Occupational Therapy Initial Assessment and Treatment    Name: aDri Morgan  : 1932  MRN: [id number]  Date of Service: 2023    Discharge Recommendations:  Dari Morgan scored a 14/24 on the AM-PAC ADL Inpatient form. Current research shows that an AM-PAC score of 17 or less is typically not associated with a discharge to the patient's home setting. Based on the patient's AM-PAC score and their current ADL deficits, it is recommended that the patient have 3-5 sessions per week of Occupational Therapy at d/c to increase the patient's independence. Please see assessment section for further patient specific details. If patient discharges prior to next session this note will serve as a discharge summary. Please see below for the latest assessment towards goals. OT Equipment Recommendations  Equipment Needed: No  Other: defer to d/c setting       Patient Diagnosis(es): The primary encounter diagnosis was Acute right-sided low back pain without sciatica. Diagnoses of Dehydration, Pneumonia of both lower lobes due to infectious organism, Hyponatremia, and Degenerative disc disease, lumbar were also pertinent to this visit. Past Medical History:  has a past medical history of Arthritis, CAD (coronary artery disease), CTS (carpal tunnel syndrome), DVT (deep venous thrombosis) ([address]), Hyperlipidemia, Hypertension, Lumbar disc disease, Pacemaker, Psoriasis, Thyroid disease, and Venous stasis. Past Surgical History:  has a past surgical history that includes Appendectomy; Thyroidectomy, partial; Breast surgery; Pacemaker insertion (); Finger trigger release (4/10/2012); joint replacement (); joint replacement; Carpal tunnel release (Right, 14); Colonoscopy (04); hernia repair (); and Colonoscopy.     Treatment Diagnosis: Impaired ADLs, mobility and activity tolerance      Assessment   Performance deficits / assistance (from bed)  Stand to sit: Moderate assistance (to recliner; pt became anxious, required max VC and hand over hand to reach back for chair)    Functional Mobility  Equipment: Rolling Walker  Level of Assist: minimal assistance  Distance: to/from bathroom  Comment: pt shaky, slow pace and effortful, LEs began near buckling as pt approached chair 2/2 fatigue    Sitting Balance: SBA static, CGA-min during LE AROM at EOB  Standing Balance: min during ADLs/static stance at walker  Time in Stance: 2 min x3      Vision  Vision: Within Functional Limits  Vision Exceptions: Wears glasses for reading  Hearing  Hearing: Within functional limits      Cognition  Overall Cognitive Status: Exceptions  Following Commands: Follows one step commands with repetition; Follows one step commands with increased time  Memory: Decreased short term memory;Decreased recall of recent events  Problem Solving: Decreased awareness of errors  Insights: Decreased awareness of deficits  Initiation: Requires cues for some  Orientation  Overall Orientation Status: Impaired (Simultaneous filing. User may not have seen previous data.)  Orientation Level: Oriented to person;Disoriented to place; Disoriented to time;Disoriented to situation (Simultaneous filing. User may not have seen previous data.)       Safety Devices  Type of Devices: Call light within reach; Chair alarm in place;Gait belt;Nurse notified; Left in chair       AM-PAC Score        AM-PAC Inpatient Daily Activity Raw Score: 14 (09/17/23 0917)  AM-PAC Inpatient ADL T-Scale Score : 33.39 (09/17/23 0917)  ADL Inpatient CMS 0-100% Score: 59.67 (09/17/23 0917)  ADL Inpatient CMS G-Code Modifier : CK (09/17/23 0917)    Tinneti Score       Goals  Short Term Goals  Time Frame for Short Term Goals: discharge  Short Term Goal 1: transfer from [facility] over toilet with CGA  Short Term Goal 2: CGA standing balance during ADLs  Short Term Goal 3: tolerate standing 5 minutes for ADLs/mobility for

## 2023-09-18 LAB
ALBUMIN SERPL-MCNC: 2.7 G/DL (ref 3.4–5)
ALBUMIN/GLOB SERPL: 0.7 {RATIO} (ref 1.1–2.2)
ALP SERPL-CCNC: 135 U/L (ref 40–129)
ALT SERPL-CCNC: 24 U/L (ref 10–40)
ANION GAP SERPL CALCULATED.3IONS-SCNC: 13 MMOL/L (ref 3–16)
AST SERPL-CCNC: 35 U/L (ref 15–37)
BASOPHILS # BLD: 0 K/UL (ref 0–0.2)
BASOPHILS NFR BLD: 0 %
BILIRUB SERPL-MCNC: 0.7 MG/DL (ref 0–1)
BUN SERPL-MCNC: 21 MG/DL (ref 7–20)
CALCIUM SERPL-MCNC: 9.4 MG/DL (ref 8.3–10.6)
CHLORIDE SERPL-SCNC: 100 MMOL/L (ref 99–110)
CO2 SERPL-SCNC: 22 MMOL/L (ref 21–32)
CREAT SERPL-MCNC: 0.8 MG/DL (ref 0.6–1.2)
DEPRECATED RDW RBC AUTO: 15.5 % (ref 12.4–15.4)
EOSINOPHIL # BLD: 0.1 K/UL (ref 0–0.6)
EOSINOPHIL NFR BLD: 1 %
GFR SERPLBLD CREATININE-BSD FMLA CKD-EPI: >60 ML/MIN/{1.73_M2}
GLUCOSE SERPL-MCNC: 86 MG/DL (ref 70–99)
HCT VFR BLD AUTO: 32.5 % (ref 36–48)
HGB BLD-MCNC: 11.3 G/DL (ref 12–16)
LYMPHOCYTES # BLD: 1.3 K/UL (ref 1–5.1)
LYMPHOCYTES NFR BLD: 13 %
MCH RBC QN AUTO: 29.7 PG (ref 26–34)
MCHC RBC AUTO-ENTMCNC: 34.6 G/DL (ref 31–36)
MCV RBC AUTO: 85.7 FL (ref 80–100)
MONOCYTES # BLD: 0.8 K/UL (ref 0–1.3)
MONOCYTES NFR BLD: 8 %
MYELOCYTES NFR BLD MANUAL: 1 %
NEUTROPHILS # BLD: 7.7 K/UL (ref 1.7–7.7)
NEUTROPHILS NFR BLD: 77 %
PLATELET # BLD AUTO: 292 K/UL (ref 135–450)
PMV BLD AUTO: 8.7 FL (ref 5–10.5)
POTASSIUM SERPL-SCNC: 4 MMOL/L (ref 3.5–5.1)
PROCALCITONIN SERPL IA-MCNC: 0.3 NG/ML (ref 0–0.15)
PROT SERPL-MCNC: 6.7 G/DL (ref 6.4–8.2)
RBC # BLD AUTO: 3.79 M/UL (ref 4–5.2)
SODIUM SERPL-SCNC: 135 MMOL/L (ref 136–145)
WBC # BLD AUTO: 9.9 K/UL (ref 4–11)

## 2023-09-18 PROCEDURE — 6360000002 HC RX W HCPCS: Performed by: NURSE PRACTITIONER

## 2023-09-18 PROCEDURE — 6370000000 HC RX 637 (ALT 250 FOR IP): Performed by: FAMILY MEDICINE

## 2023-09-18 PROCEDURE — 85025 COMPLETE CBC W/AUTO DIFF WBC: CPT

## 2023-09-18 PROCEDURE — 6360000002 HC RX W HCPCS: Performed by: FAMILY MEDICINE

## 2023-09-18 PROCEDURE — 6370000000 HC RX 637 (ALT 250 FOR IP): Performed by: NURSE PRACTITIONER

## 2023-09-18 PROCEDURE — 36415 COLL VENOUS BLD VENIPUNCTURE: CPT

## 2023-09-18 PROCEDURE — 2580000003 HC RX 258: Performed by: FAMILY MEDICINE

## 2023-09-18 PROCEDURE — 97530 THERAPEUTIC ACTIVITIES: CPT

## 2023-09-18 PROCEDURE — 97535 SELF CARE MNGMENT TRAINING: CPT

## 2023-09-18 PROCEDURE — 1200000000 HC SEMI PRIVATE

## 2023-09-18 PROCEDURE — 80053 COMPREHEN METABOLIC PANEL: CPT

## 2023-09-18 PROCEDURE — 84145 PROCALCITONIN (PCT): CPT

## 2023-09-18 RX ORDER — DEXAMETHASONE 4 MG/1
4 TABLET ORAL DAILY
Status: DISCONTINUED | OUTPATIENT
Start: 2023-09-18 | End: 2023-09-19 | Stop reason: HOSPADM

## 2023-09-18 RX ORDER — HYDROCODONE BITARTRATE AND ACETAMINOPHEN 5; 325 MG/1; MG/1
1 TABLET ORAL EVERY 6 HOURS PRN
Status: DISCONTINUED | OUTPATIENT
Start: 2023-09-18 | End: 2023-09-19 | Stop reason: HOSPADM

## 2023-09-18 RX ADMIN — DEXAMETHASONE 4 MG: 4 TABLET ORAL at 11:59

## 2023-09-18 RX ADMIN — ATORVASTATIN CALCIUM 40 MG: 40 TABLET, FILM COATED ORAL at 20:36

## 2023-09-18 RX ADMIN — HYDROCODONE BITARTRATE AND ACETAMINOPHEN 1 TABLET: 5; 325 TABLET ORAL at 05:33

## 2023-09-18 RX ADMIN — METOPROLOL SUCCINATE 12.5 MG: 25 TABLET, EXTENDED RELEASE ORAL at 08:36

## 2023-09-18 RX ADMIN — APIXABAN 2.5 MG: 2.5 TABLET, FILM COATED ORAL at 20:36

## 2023-09-18 RX ADMIN — TRAMADOL HYDROCHLORIDE 50 MG: 50 TABLET, COATED ORAL at 03:14

## 2023-09-18 RX ADMIN — AZITHROMYCIN MONOHYDRATE 500 MG: 250 TABLET ORAL at 20:36

## 2023-09-18 RX ADMIN — SODIUM CHLORIDE, PRESERVATIVE FREE 10 ML: 5 INJECTION INTRAVENOUS at 20:36

## 2023-09-18 RX ADMIN — TIZANIDINE 2 MG: 4 TABLET ORAL at 08:36

## 2023-09-18 RX ADMIN — HYDROCODONE BITARTRATE AND ACETAMINOPHEN 1 TABLET: 5; 325 TABLET ORAL at 22:17

## 2023-09-18 RX ADMIN — HYDROCODONE BITARTRATE AND ACETAMINOPHEN 1 TABLET: 5; 325 TABLET ORAL at 11:59

## 2023-09-18 RX ADMIN — SODIUM CHLORIDE, PRESERVATIVE FREE 10 ML: 5 INJECTION INTRAVENOUS at 08:36

## 2023-09-18 RX ADMIN — APIXABAN 2.5 MG: 2.5 TABLET, FILM COATED ORAL at 08:36

## 2023-09-18 RX ADMIN — LEVOTHYROXINE SODIUM 50 MCG: 50 TABLET ORAL at 05:33

## 2023-09-18 RX ADMIN — CEFTRIAXONE 1000 MG: 1 INJECTION, POWDER, FOR SOLUTION INTRAMUSCULAR; INTRAVENOUS at 20:37

## 2023-09-18 ASSESSMENT — PAIN DESCRIPTION - DESCRIPTORS
DESCRIPTORS: ACHING
DESCRIPTORS: ACHING;SHARP
DESCRIPTORS: ACHING
DESCRIPTORS: ACHING

## 2023-09-18 ASSESSMENT — PAIN - FUNCTIONAL ASSESSMENT
PAIN_FUNCTIONAL_ASSESSMENT: PREVENTS OR INTERFERES SOME ACTIVE ACTIVITIES AND ADLS
PAIN_FUNCTIONAL_ASSESSMENT: ACTIVITIES ARE NOT PREVENTED
PAIN_FUNCTIONAL_ASSESSMENT: ACTIVITIES ARE NOT PREVENTED
PAIN_FUNCTIONAL_ASSESSMENT: PREVENTS OR INTERFERES SOME ACTIVE ACTIVITIES AND ADLS

## 2023-09-18 ASSESSMENT — PAIN DESCRIPTION - PAIN TYPE
TYPE: CHRONIC PAIN

## 2023-09-18 ASSESSMENT — PAIN DESCRIPTION - ORIENTATION
ORIENTATION: MID;LOWER
ORIENTATION: MID;LOWER
ORIENTATION: LOWER
ORIENTATION: LOWER;POSTERIOR

## 2023-09-18 ASSESSMENT — PAIN DESCRIPTION - LOCATION
LOCATION: BACK

## 2023-09-18 ASSESSMENT — PAIN SCALES - GENERAL
PAINLEVEL_OUTOF10: 6
PAINLEVEL_OUTOF10: 8
PAINLEVEL_OUTOF10: 8
PAINLEVEL_OUTOF10: 5
PAINLEVEL_OUTOF10: 4
PAINLEVEL_OUTOF10: 4
PAINLEVEL_OUTOF10: 6
PAINLEVEL_OUTOF10: 1

## 2023-09-18 ASSESSMENT — PAIN DESCRIPTION - FREQUENCY
FREQUENCY: CONTINUOUS

## 2023-09-18 ASSESSMENT — PAIN SCALES - WONG BAKER: WONGBAKER_NUMERICALRESPONSE: 2

## 2023-09-18 ASSESSMENT — PAIN DESCRIPTION - ONSET
ONSET: ON-GOING

## 2023-09-18 NOTE — PROGRESS NOTES
Pt alert and oriented X2, disoriented to time and situation so re- orientation given. VS taken and recorded,. SPO2 maintained at RA. Pain managed as per [facility] & non- pharmacological measure too. Is on oral diet and tolerating well. Pt voiding adequately with pure wick. No BM during this shift. All standard safety precautions in place, call light within reach, no fall injury during this shift. Plan of care ongoing.

## 2023-09-18 NOTE — PLAN OF CARE
Problem: ABCDS Injury Assessment  Goal: Absence of physical injury  Outcome: Progressing     Problem: Safety - Adult  Goal: Free from fall injury  9/18/2023 1954 by Paula Johnson RN  Outcome: Progressing    Fall risk precaution in place. Bed is locked and in lowest position. 2/4 side rails are up. Call light with in reach. Fall risk bracelet in place, non slip socks on.frequent check on patient. free from falls at this time. will continue to monitor. Problem: Pain  Goal: Verbalizes/displays adequate comfort level or baseline comfort level  9/18/2023 1954 by Paula Johnson RN  Outcome: Progressing     Problem: Discharge Planning  Goal: Discharge to home or other facility with appropriate resources  Outcome: Progressing     Problem: Chronic Conditions and Co-morbidities  Goal: Patient's chronic conditions and co-morbidity symptoms are monitored and maintained or improved  Outcome: Progressing     Problem: Skin/Tissue Integrity  Goal: Absence of new skin breakdown  Description: 1. Monitor for areas of redness and/or skin breakdown  2. Assess vascular access sites hourly  3. Every 4-6 hours minimum:  Change oxygen saturation probe site  4. Every 4-6 hours:  If on nasal continuous positive airway pressure, respiratory therapy assess nares and determine need for appliance change or resting period.   9/18/2023 1954 by Paula Johnson RN  Outcome: Progressing

## 2023-09-18 NOTE — PLAN OF CARE
Problem: ABCDS Injury Assessment  Goal: Absence of physical injury  Outcome: Progressing  Note: Pt free from physical injury. Flowsheets (Taken 9/17/2023 2236)  Absence of Physical Injury: Implement safety measures based on patient assessment     Problem: Safety - Adult  Goal: Free from fall injury  9/17/2023 2239 by Trish Montelongo RN  Outcome: Progressing  Note: All standard safety precautions in place, call light within reach, free from fall injury. Flowsheets (Taken 9/17/2023 2236)  Free From Fall Injury: Instruct family/caregiver on patient safety     Problem: Pain  Goal: Verbalizes/displays adequate comfort level or baseline comfort level  9/17/2023 2239 by Trish Montelongo RN  Outcome: Progressing  Note: Numeric pain rating scale used for assessment.  Pian managed with  MAR & non- pharmacological measures too     Problem: Discharge Planning  Goal: Discharge to home or other facility with appropriate resources  Outcome: Progressing  Flowsheets (Taken 9/17/2023 1900)  Discharge to home or other facility with appropriate resources:   Identify barriers to discharge with patient and caregiver   Arrange for needed discharge resources and transportation as appropriate   Identify discharge learning needs (meds, wound care, etc)

## 2023-09-18 NOTE — PROGRESS NOTES
Occupational Therapy  Facility/Department: [facility] 5T ORTHO/NEURO  Daily Treatment Note  NAME: Dedra Mace  : 1932  MRN: [id number]    Date of Service: 2023    Discharge Recommendations: Dedra Mace scored a 11/24 on the AM-PAC ADL Inpatient form. Current research shows that an AM-PAC score of 17 or less is typically not associated with a discharge to the patient's home setting. Based on the patient's AM-PAC score and their current ADL deficits, it is recommended that the patient have 3-5 sessions per week of Occupational Therapy at d/c to increase the patient's independence. Please see assessment section for further patient specific details. If patient discharges prior to next session this note will serve as a discharge summary. Please see below for the latest assessment towards goals. OT Equipment Recommendations  Other: defer to d/c setting      Patient Diagnosis(es): The primary encounter diagnosis was Acute right-sided low back pain without sciatica. Diagnoses of Dehydration, Pneumonia of both lower lobes due to infectious organism, Hyponatremia, and Degenerative disc disease, lumbar were also pertinent to this visit. Assessment    Assessment: Pt agreeable to OT tx with maximum encouragement/education on importance and tolerates session fairly. Pt primarily limited by pain. Pt presents functioning below prior baseline and demos decreased activity tolerance, global deconditioning, decreased cognition, and generalized weakness. Pt requires modA to Carla for functional transfers and functional mobility. Pt requires Carla to total A for ADLs. Pt requires increased time for all tasks and v.cues for sequencing. Pt will benefit from continued skilled OT to address above deficits and progress towards PLOF. Cont per POC. Activity Tolerance: Patient tolerated evaluation without incident;Patient limited by fatigue;Patient limited by endurance; Patient limited by pain  Other: defer to d/c hand placement/safety)  Stand to Sit: Moderate assistance;Assist X1 (to safely sit in chair)  Gait  Overall Level of Assistance: Minimum assistance; Additional time;Assist X1 (around bed to chair with intermittent LOB; pt requiring encouragement for duration of task and chair follow; pt requiring intermittent standing rest breaks 2/2 fatigue)  Interventions: Verbal cues; Safety awareness training  Assistive Device: Walker, rolling;Gait belt     ADL  Feeding: Maximum assistance;Setup;Scoop assist  Feeding Skilled Clinical Factors: max encouragement to participate; difficulty with scooping 2/2 pain but able to bring to mouth after scooped and setup in hand  Grooming: Minimal assistance; Increased time to complete  Grooming Skilled Clinical Factors: oral care  LE Dressing: Dependent/Total  LE Dressing Skilled Clinical Factors: don socks  Toileting: Dependent/Total (external catheter)        Safety Devices  Type of Devices: Call light within reach; Chair alarm in place;Gait belt;Nurse notified; Left in chair     Patient Education  Education Given To: Patient  Education Provided: Role of Therapy; ADL Adaptive Strategies; Plan of Care; Fall Prevention Strategies; Family Education;Transfer Training  Education Method: Verbal  Barriers to Learning: Cognition  Education Outcome: Continued education needed    Goals  Short Term Goals  Time Frame for Short Term Goals: discharge  Short Term Goal 1: transfer from [facility] over toilet with CGA - not met  Short Term Goal 2: CGA standing balance during ADLs - not met  Short Term Goal 3: tolerate standing 5 minutes for ADLs/mobility for ADLs - not met  Short Term Goal 4: complete 10 reps UE exercises - not addressed       Therapy Time   Individual Concurrent Group Co-treatment   Time In 1139         Time Out 1247         Minutes 68             Timed Code Treatment Minutes:  68 mins    Total Treatment Minutes:  68 mins      TORRIE Traylor OTR/L

## 2023-09-18 NOTE — PLAN OF CARE
Problem: Safety - Adult  Goal: Free from fall injury  Outcome: Progressing  Note: Fall precautions in place. Non-skid socks on. Bed locked in lowest position with alarm on and side rails up. Bedside table, belongings, and call light within reach. Hourly rounding by RN in anticipation of patient needs. Floor clean and free from clutter. Room door open. Plan of care continues. Problem: Pain  Goal: Verbalizes/displays adequate comfort level or baseline comfort level  Outcome: Progressing  Flowsheets (Taken 9/18/2023 0300 by Enma Salmon RN)  Verbalizes/displays adequate comfort level or baseline comfort level:   Encourage patient to monitor pain and request assistance   Assess pain using appropriate pain scale   Administer analgesics based on type and severity of pain and evaluate response   Implement non-pharmacological measures as appropriate and evaluate response  Note: Endorsing pain to lower back. Using numerical pain rating scale. PRN Norco and tylenol administered per the [facility]. Patient educated on medications, side effects, and verbalized understanding but needs reinforcement. Lidocaine patch placed to lumbar spine. Assisted with turning and repositioning in bed/chair for comfort and pressure relief. Notified of pain medication administration schedule. Plan of care continues. Problem: Skin/Tissue Integrity  Goal: Absence of new skin breakdown  Description: 1. Monitor for areas of redness and/or skin breakdown  2. Assess vascular access sites hourly  3. Every 4-6 hours minimum:  Change oxygen saturation probe site  4. Every 4-6 hours:  If on nasal continuous positive airway pressure, respiratory therapy assess nares and determine need for appliance change or resting period. Outcome: Progressing  Note: Scattered abrasions and ecchymosis. Redness to coccyx. Sacral heart in place. Q2 turning and repositioning. Heels and elbows elevated of bed surface. Incontinent of urine. Purewick in place.  Adriana

## 2023-09-18 NOTE — PROGRESS NOTES
Patient is alert to self and place only. VSS, on room air. Endorsing pain to lower back. Lidocaine patch applied to lumbar spine. PRN Norco administered per the [facility]. Assisted with frequent turning and repositioning for comfort and pressure relief. Tolerating diet well but has decreased appetite. Denies n/v. Voiding without complications via purewick. X1 stand/pivot for transfers. Fall precautions in place. Bed locked in lowest position with alarm on. Call light within reach. Hourly rounding in anticipation of patient needs. Plan of care continues.

## 2023-09-18 NOTE — CARE COORDINATION
Case Management Assessment  Initial Evaluation    Date/Time of Evaluation: 9/18/2023 3:03 PM  Assessment Completed by: Jed Schneider RN    If patient is discharged prior to next notation, then this note serves as note for discharge by case management. Patient Name: Pili Martinez                   YOB: 1932  Diagnosis: Dehydration [E86.0]  Hyponatremia [E87.1]  Degenerative disc disease, lumbar [M51.36]  Pneumonia of both lower lobes due to infectious organism [J18.9]  Pneumonia, unspecified organism [J18.9]  Acute right-sided low back pain without sciatica [M54.50]  Intractable back pain [M54.9]                   Date / Time: 9/15/2023  5:58 PM    Patient Admission Status: Inpatient   Readmission Risk (Low < 19, Mod (19-27), High > 27): Readmission Risk Score: 17.7    Current PCP: Festus Louis MD  PCP verified by CM? No    Chart Reviewed: Yes      History Provided by: Child/Family Christine Toussaint)  Patient Orientation: Person    Patient Cognition: Alert    Hospitalization in the last 30 days (Readmission):  Yes    If yes, Readmission Assessment in CM Navigator will be completed. Advance Directives:      Code Status: Full Code   Patient's Primary Decision Maker is: Legal Next of Kin      Discharge Planning:    Patient lives with: Alone Type of Home: [address] [place] [address]  Primary Care Giver:  Other (Comment) (SNF)  Patient Support Systems include: Children   Current Financial resources: Medicare  Current community resources: ECF/Home Care  Current services prior to admission: Durable Medical Equipment            Current DME:              Type of Home Care services:  None    ADLS  Prior functional level: Assistance with the following:, Bathing, Dressing, Mobility  Current functional level: Assistance with the following:, Bathing, Dressing, Mobility    PT AM-PAC: 15 /24  OT AM-PAC: 11 /24    Family can provide assistance at DC: No  Would you like Case Management to discuss the discharge plan with

## 2023-09-19 VITALS
RESPIRATION RATE: 18 BRPM | HEIGHT: 65 IN | WEIGHT: 149.7 LBS | DIASTOLIC BLOOD PRESSURE: 76 MMHG | TEMPERATURE: 98 F | OXYGEN SATURATION: 94 % | SYSTOLIC BLOOD PRESSURE: 148 MMHG | BODY MASS INDEX: 24.94 KG/M2 | HEART RATE: 60 BPM

## 2023-09-19 LAB
ALBUMIN SERPL-MCNC: 2.7 G/DL (ref 3.4–5)
ALBUMIN/GLOB SERPL: 0.6 {RATIO} (ref 1.1–2.2)
ALP SERPL-CCNC: 145 U/L (ref 40–129)
ALT SERPL-CCNC: 33 U/L (ref 10–40)
ANION GAP SERPL CALCULATED.3IONS-SCNC: 11 MMOL/L (ref 3–16)
AST SERPL-CCNC: 48 U/L (ref 15–37)
BASOPHILS # BLD: 0 K/UL (ref 0–0.2)
BASOPHILS NFR BLD: 0 %
BILIRUB SERPL-MCNC: 0.8 MG/DL (ref 0–1)
BUN SERPL-MCNC: 21 MG/DL (ref 7–20)
CALCIUM SERPL-MCNC: 9.6 MG/DL (ref 8.3–10.6)
CHLORIDE SERPL-SCNC: 100 MMOL/L (ref 99–110)
CO2 SERPL-SCNC: 22 MMOL/L (ref 21–32)
CREAT SERPL-MCNC: 0.8 MG/DL (ref 0.6–1.2)
DEPRECATED RDW RBC AUTO: 16.1 % (ref 12.4–15.4)
EOSINOPHIL # BLD: 0.1 K/UL (ref 0–0.6)
EOSINOPHIL NFR BLD: 1 %
GFR SERPLBLD CREATININE-BSD FMLA CKD-EPI: >60 ML/MIN/{1.73_M2}
GLUCOSE SERPL-MCNC: 106 MG/DL (ref 70–99)
HCT VFR BLD AUTO: 34.7 % (ref 36–48)
HGB BLD-MCNC: 11.5 G/DL (ref 12–16)
LYMPHOCYTES # BLD: 0.9 K/UL (ref 1–5.1)
LYMPHOCYTES NFR BLD: 7 %
MCH RBC QN AUTO: 29.1 PG (ref 26–34)
MCHC RBC AUTO-ENTMCNC: 33.1 G/DL (ref 31–36)
MCV RBC AUTO: 88 FL (ref 80–100)
MONOCYTES # BLD: 0.5 K/UL (ref 0–1.3)
MONOCYTES NFR BLD: 4 %
NEUTROPHILS # BLD: 10.9 K/UL (ref 1.7–7.7)
NEUTROPHILS NFR BLD: 87 %
NEUTS BAND NFR BLD MANUAL: 1 % (ref 0–7)
PLATELET # BLD AUTO: 400 K/UL (ref 135–450)
PMV BLD AUTO: 8.6 FL (ref 5–10.5)
POTASSIUM SERPL-SCNC: 4.4 MMOL/L (ref 3.5–5.1)
PROT SERPL-MCNC: 6.9 G/DL (ref 6.4–8.2)
RBC # BLD AUTO: 3.94 M/UL (ref 4–5.2)
SODIUM SERPL-SCNC: 133 MMOL/L (ref 136–145)
WBC # BLD AUTO: 12.4 K/UL (ref 4–11)

## 2023-09-19 PROCEDURE — 36415 COLL VENOUS BLD VENIPUNCTURE: CPT

## 2023-09-19 PROCEDURE — 6370000000 HC RX 637 (ALT 250 FOR IP): Performed by: NURSE PRACTITIONER

## 2023-09-19 PROCEDURE — 6360000002 HC RX W HCPCS: Performed by: NURSE PRACTITIONER

## 2023-09-19 PROCEDURE — 6370000000 HC RX 637 (ALT 250 FOR IP): Performed by: FAMILY MEDICINE

## 2023-09-19 PROCEDURE — 2580000003 HC RX 258: Performed by: FAMILY MEDICINE

## 2023-09-19 PROCEDURE — 80053 COMPREHEN METABOLIC PANEL: CPT

## 2023-09-19 PROCEDURE — 85025 COMPLETE CBC W/AUTO DIFF WBC: CPT

## 2023-09-19 RX ORDER — HYDROCODONE BITARTRATE AND ACETAMINOPHEN 5; 325 MG/1; MG/1
1 TABLET ORAL EVERY 6 HOURS PRN
Qty: 12 TABLET | Refills: 0 | Status: SHIPPED | OUTPATIENT
Start: 2023-09-19 | End: 2023-09-22

## 2023-09-19 RX ORDER — BENZONATATE 100 MG/1
100 CAPSULE ORAL 3 TIMES DAILY PRN
DISCHARGE
Start: 2023-09-19 | End: 2023-09-26

## 2023-09-19 RX ORDER — ATORVASTATIN CALCIUM 40 MG/1
40 TABLET, FILM COATED ORAL NIGHTLY
Qty: 30 TABLET | Refills: 3 | DISCHARGE
Start: 2023-09-19

## 2023-09-19 RX ORDER — HYDRALAZINE HYDROCHLORIDE 20 MG/ML
10 INJECTION INTRAMUSCULAR; INTRAVENOUS EVERY 6 HOURS PRN
Status: DISCONTINUED | OUTPATIENT
Start: 2023-09-19 | End: 2023-09-19 | Stop reason: HOSPADM

## 2023-09-19 RX ORDER — POLYETHYLENE GLYCOL 3350 17 G/17G
17 POWDER, FOR SOLUTION ORAL DAILY PRN
Qty: 30 EACH | Refills: 0 | DISCHARGE
Start: 2023-09-19

## 2023-09-19 RX ORDER — METHYLPREDNISOLONE 4 MG/1
TABLET ORAL
DISCHARGE
Start: 2023-09-19 | End: 2023-09-25

## 2023-09-19 RX ORDER — AMOXICILLIN AND CLAVULANATE POTASSIUM 875; 125 MG/1; MG/1
1 TABLET, FILM COATED ORAL 2 TIMES DAILY
Qty: 4 TABLET | Refills: 0 | DISCHARGE
Start: 2023-09-19 | End: 2023-09-21

## 2023-09-19 RX ORDER — LIDOCAINE 4 G/G
1 PATCH TOPICAL DAILY
DISCHARGE
Start: 2023-09-20

## 2023-09-19 RX ADMIN — SODIUM CHLORIDE, PRESERVATIVE FREE 10 ML: 5 INJECTION INTRAVENOUS at 08:34

## 2023-09-19 RX ADMIN — METOPROLOL SUCCINATE 12.5 MG: 25 TABLET, EXTENDED RELEASE ORAL at 08:25

## 2023-09-19 RX ADMIN — APIXABAN 2.5 MG: 2.5 TABLET, FILM COATED ORAL at 08:24

## 2023-09-19 RX ADMIN — DEXAMETHASONE 4 MG: 4 TABLET ORAL at 08:24

## 2023-09-19 RX ADMIN — LEVOTHYROXINE SODIUM 50 MCG: 50 TABLET ORAL at 05:55

## 2023-09-19 NOTE — DISCHARGE INSTR - COC
Continuity of Care Form    Patient Name: Moon Gallegos   :  1932  MRN:  [id number]    [address] date:  9/15/2023  Discharge date:  ***    Code Status Order: Full Code   Advance Directives:     Admitting Physician:  Татьяна Carney MD  PCP: Arturo Chatman MD    Discharging Nurse: [facility] Unit/Room#: 9015/8599-38  Discharging Unit Phone Number: ***    Emergency Contact:   Extended Emergency Contact Information  Primary Emergency Contact: Josette Carrillo   [facility] [address] [address] Phone: [phone]  Relation: Child  Secondary Emergency Contact: [address] [facility] [employer] Phone: [phone]  Work Phone: [phone]  Relation: Child    Past Surgical History:  Past Surgical History:   Procedure Laterality Date    APPENDECTOMY      BREAST SURGERY      lumpectomy - benign    CARPAL TUNNEL RELEASE Right 14    COLONOSCOPY  04    Sigmoid diverticulosis    COLONOSCOPY      FINGER TRIGGER RELEASE  4/10/2012    left middle finger, and left CTR    HERNIA REPAIR      rt inguinal    JOINT REPLACEMENT      THR right    JOINT REPLACEMENT      TKR left    PACEMAKER INSERTION      THYROIDECTOMY, PARTIAL         Immunization History:   Immunization History   Administered Date(s) Administered    COVID-19, PFIZER GRAY top, DO NOT Dilute, (age 15 y+), IM, 30 mcg/0.3 mL 2022    COVID-19, PFIZER PURPLE top, DILUTE for use, (age 15 y+), 30mcg/0.3mL 2021, 2021, 2021    Pneumococcal, PPSV23, PNEUMOVAX 23, (age 2y+), SC/IM, 0.5mL 2013       Active Problems:  Patient Active Problem List   Diagnosis Code    Trigger finger, acquired M65.30    Carpal tunnel syndrome G56.00    Pain in limb M79.609    Dermatophytosis of nail B35.1    Diplopia H53.2    Left pontine stroke ([address]) I63.9    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

## 2023-09-19 NOTE — CARE COORDINATION
#[id number] Steven Burns, [facility]  [address]  [address] [facility] [address]  Phone: [phone] Fax: [phone]      Assistance purchasing medications?:    Assistance provided by Case Management: None at this time    Does patient want to participate in local refill/ meds to beds program?: No    Meds To Beds General Rules:  1. Can ONLY be done Monday- Friday between 8:30am-5pm  2. Prescription(s) must be in pharmacy by 3pm to be filled same day  3. Copy of patient's insurance/ prescription drug card and patient face sheet must be sent along with the prescription(s)  4. Cost of Rx cannot be added to hospital bill. If financial assistance is needed, please contact unit  or ;  or  CANNOT provide pharmacy voucher for patients co-pays  5. Patients can then  the prescription on their way out of the hospital at discharge, or pharmacy can deliver to the bedside if staff is available. (payment due at time of pick-up or delivery - cash, check, or card accepted)     Able to afford home medications/ co-pay costs: Yes    ADLS:  Current PT AM-PAC Score: 15 /24  Current OT AM-PAC Score: 11 /24      DISCHARGE Disposition: [address] (SNF): [facility] Phone: [phone] Fax: [phone]    LOC at discharge: Skilled  ENRIQUE Completed: Yes    Notification completed in HENS/PAS?:  No, has hens from last admission    IMM Completed:        IMM Letter given to Patient/Family/Significant other/Guardian/POA/by[de-identified] Ramsey Vergara RN  1st IMM  IMM Letter date given[de-identified] 09/16/23  IMM Letter time given[de-identified] 0306    Transportation:  Transportation PLAN for discharge: EMS transportation   Mode of Transport: Ambulance stretcher - BLS  Reason for medical transport: Other: confused due to dementia and hx stroke  Name of [address] [place] [address] Road: [address] [facility]  Phone: [phone]  Time of Transport: 1600    Transport form completed: Yes    Home Care:  Home Care ordered at discharge: Not [address]: Not Applicable  Orders faxed: No    Durable Medical Equipment:  DME Provider: n/a  Equipment obtained during hospitalization: n/a    Home Oxygen and Respiratory Equipment:  Oxygen needed at discharge?: Not [address]: Not Applicable  Portable tank available for discharge?: Not Indicated      Additional CM Notes:   Patient is [facility] back [facility]. Orders faxed to [phone], nurse to call report to [phone]. Patient scheduled for transport at 1600 via [address]. Patient's daughter Kathlean Severe aware and agreeable to plan. COVID Result:  No results found for: \"COVID19\"    The Plan for Transition of Care is related to the following treatment goals of Dehydration [E86.0]  Hyponatremia [E87.1]  Degenerative disc disease, lumbar [M51.36]  Pneumonia of both lower lobes due to infectious organism [J18.9]  Pneumonia, unspecified organism [J18.9]  Acute right-sided low back pain without sciatica [M54.50]  Intractable back pain [M54.9]    The Patient and/or patient representative Jama Guillory and her family were provided with a choice of provider and agrees with the discharge plan Yes    Freedom of choice list was provided with basic dialogue that supports the patient's individualized plan of care/goals and shares the quality data associated with the providers.  Yes    Care Transitions patient: No    Makayla Eddy RN  [facility]  Case Management Department  Ph: [phone]  Fax: [phone]

## 2023-09-19 NOTE — DISCHARGE SUMMARY
V2.0  Discharge Summary    Name:  Maida Rinne /Age/Sex: 1932 (80 y.o. female)   Admit Date: 9/15/2023  Discharge Date: 23    MRN & CSN:  [id number] & [id number] Encounter Date and Time 23 5:07 PM EDT    Attending:  No att. providers found Discharging Provider: HENRRY Phillips [facility] Course:     Brief HPI: Maida Rinne is a 80 y.o. female with a past medical history of arthritis, coronary artery disease, carpal tunnel syndrome, DVT, hypertension, hyperlipidemia, lumbar degenerative disc disease, sick sinus syndrome with permanent pacemaker presented to the emergency room with complaints of right lower back pain. In the ED patient was found to have severe multilevel degenerative disc disease on CT scan (pacemaker leads are not compatible with MRI). CT abdomen demonstrated airspace disease in the bilateral lower lobes. Brief Problem Based Course:   Community-acquired pneumonia-patient was started on Rocephin and azithromycin. Procalcitonin trended down. Antibiotics de-escalated  Low back pain-CT demonstrated severe multilevel degenerative disc disease. Steroids, lidocaine patch, LSO brace  Recent stroke-aspirin 81 mg and Eliquis  A-fib-patient was rate controlled. Continued home medications  Hypothyroidism-continued home levothyroxine      The patient expressed appropriate understanding of, and agreement with the discharge recommendations, medications, and plan.      Consults this admission:  None    Discharge Diagnosis:   Pneumonia, unspecified organism        Discharge Instruction:   Follow up appointments:   Primary care physician: Macho Cota MD within 2 weeks  Diet: regular diet   Activity: activity as tolerated  Disposition: Discharged to:   []Home, []C, [x]SNF, []Acute Rehab, []Hospice   Condition on discharge: Stable  Labs and Tests to be Followed up as an outpatient by PCP or Specialist:     Discharge Medications:        Medication List

## 2023-09-19 NOTE — PROGRESS NOTES
Report called to Leatha at [facility]. RN provided with updates as patient was previously staying there. Pt discharged via transport. IV removed. All belongings with patient and transport. Denies needs at time of discharge.

## 2023-09-19 NOTE — PROGRESS NOTES
Patient is alert to self and place, disoriented to time and situation. VSS,with exception to elevated blood pressure, attending MD notified. RA. Pt c/o pain to back medicated with Norco as per MAR. No any other complain. Tolerating fluids well. All fall precaution is in the place and call light is with in the reach.

## 2023-09-19 NOTE — PLAN OF CARE
Problem: Safety - Adult  Goal: Free from fall injury  9/19/2023 0712 by Ocie Simmonds, RN  Outcome: Progressing   All fall precautions in place. Bed locked and in lowest position with alarm on. Overbed table and personal belonings within reach. Call light within reach and patient instructed to use call light for assistance. Non-skid socks on. Problem: Pain  Goal: Verbalizes/displays adequate comfort level or baseline comfort level  9/19/2023 0712 by Ocie Simmonds, RN  Outcome: Progressing   Pt endorsing pain to the lower back currently rated at a 5/10. Pain increases with movement. Pain being managed with prn and scheduled pain medication per the [facility] with some relief. Pt using rest, repositioning and distraction as non-pharm measures to decrease pain and promote comfort. Problem: Discharge Planning  Goal: Discharge to home or other facility with appropriate resources  9/19/2023 0712 by Ocie Simmonds, RN  Outcome: Progressing   Pt discharging to SNF when medically stable. Pt will return to prior SNF.

## 2023-11-04 ENCOUNTER — HOSPITAL ENCOUNTER (EMERGENCY)
Age: 88
Discharge: HOME | End: 2023-11-04
Attending: EMERGENCY MEDICINE
Payer: MEDICARE

## 2023-11-04 ENCOUNTER — APPOINTMENT (OUTPATIENT)
Dept: CT IMAGING | Age: 88
End: 2023-11-04
Payer: MEDICARE

## 2023-11-04 ENCOUNTER — APPOINTMENT (OUTPATIENT)
Dept: GENERAL RADIOLOGY | Age: 88
End: 2023-11-04
Payer: MEDICARE

## 2023-11-04 VITALS
HEART RATE: 60 BPM | WEIGHT: 148.5 LBS | SYSTOLIC BLOOD PRESSURE: 117 MMHG | DIASTOLIC BLOOD PRESSURE: 63 MMHG | HEIGHT: 65 IN | TEMPERATURE: 97.9 F | RESPIRATION RATE: 22 BRPM | BODY MASS INDEX: 24.74 KG/M2 | OXYGEN SATURATION: 92 %
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
## 2023-11-04 DIAGNOSIS — Z79.01 ANTICOAGULATED: ICD-10-CM
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
## 2023-11-04 DIAGNOSIS — S30.0XXA CONTUSION OF LOWER BACK, INITIAL ENCOUNTER: Primary | ICD-10-CM

## 2023-11-04 LAB
ANION GAP SERPL CALCULATED.3IONS-SCNC: 10 MMOL/L (ref 3–16)
BACTERIA URNS QL MICRO: ABNORMAL /HPF
BASOPHILS # BLD: 0.1 K/UL (ref 0–0.2)
BASOPHILS NFR BLD: 0.8 %
BILIRUB UR QL STRIP.AUTO: NEGATIVE
BUN SERPL-MCNC: 14 MG/DL (ref 7–20)
CALCIUM SERPL-MCNC: 9.1 MG/DL (ref 8.3–10.6)
CHLORIDE SERPL-SCNC: 100 MMOL/L (ref 99–110)
CLARITY UR: CLEAR
CO2 SERPL-SCNC: 22 MMOL/L (ref 21–32)
COLOR UR: YELLOW
CREAT SERPL-MCNC: 0.6 MG/DL (ref 0.6–1.2)
DEPRECATED RDW RBC AUTO: 17.2 % (ref 12.4–15.4)
EOSINOPHIL # BLD: 0 K/UL (ref 0–0.6)
EOSINOPHIL NFR BLD: 0.1 %
EPI CELLS #/AREA URNS HPF: ABNORMAL /HPF (ref 0–5)
GFR SERPLBLD CREATININE-BSD FMLA CKD-EPI: >60 ML/MIN/{1.73_M2}
GLUCOSE SERPL-MCNC: 123 MG/DL (ref 70–99)
GLUCOSE UR STRIP.AUTO-MCNC: NEGATIVE MG/DL
HCT VFR BLD AUTO: 33.3 % (ref 36–48)
HGB BLD-MCNC: 11.1 G/DL (ref 12–16)
HGB UR QL STRIP.AUTO: NEGATIVE
KETONES UR STRIP.AUTO-MCNC: 15 MG/DL
LEUKOCYTE ESTERASE UR QL STRIP.AUTO: NEGATIVE
LYMPHOCYTES # BLD: 0.5 K/UL (ref 1–5.1)
LYMPHOCYTES NFR BLD: 5.8 %
MCH RBC QN AUTO: 29.7 PG (ref 26–34)
MCHC RBC AUTO-ENTMCNC: 33.3 G/DL (ref 31–36)
MCV RBC AUTO: 89.2 FL (ref 80–100)
MONOCYTES # BLD: 0.8 K/UL (ref 0–1.3)
MONOCYTES NFR BLD: 9.2 %
NEUTROPHILS # BLD: 7.6 K/UL (ref 1.7–7.7)
NEUTROPHILS NFR BLD: 84.1 %
NITRITE UR QL STRIP.AUTO: NEGATIVE
PH UR STRIP.AUTO: 6 [PH] (ref 5–8)
PLATELET # BLD AUTO: 210 K/UL (ref 135–450)
PMV BLD AUTO: 9.7 FL (ref 5–10.5)
POTASSIUM SERPL-SCNC: 4.4 MMOL/L (ref 3.5–5.1)
PROT UR STRIP.AUTO-MCNC: NEGATIVE MG/DL
RBC # BLD AUTO: 3.73 M/UL (ref 4–5.2)
RBC #/AREA URNS HPF: ABNORMAL /HPF (ref 0–4)
SODIUM SERPL-SCNC: 132 MMOL/L (ref 136–145)
SP GR UR STRIP.AUTO: 1.02 (ref 1–1.03)
UA DIPSTICK W REFLEX MICRO PNL UR: ABNORMAL
URN SPEC COLLECT METH UR: ABNORMAL
UROBILINOGEN UR STRIP-ACNC: 0.2 E.U./DL
WBC # BLD AUTO: 9.1 K/UL (ref 4–11)
WBC #/AREA URNS HPF: ABNORMAL /HPF (ref 0–5)

## 2023-11-04 PROCEDURE — 85025 COMPLETE CBC W/AUTO DIFF WBC: CPT

## 2023-11-04 PROCEDURE — 2580000003 HC RX 258: Performed by: PHYSICIAN ASSISTANT

## 2023-11-04 PROCEDURE — 80048 BASIC METABOLIC PNL TOTAL CA: CPT

## 2023-11-04 PROCEDURE — 99284 EMERGENCY DEPT VISIT MOD MDM: CPT

## 2023-11-04 PROCEDURE — 6370000000 HC RX 637 (ALT 250 FOR IP): Performed by: PHYSICIAN ASSISTANT

## 2023-11-04 PROCEDURE — 70450 CT HEAD/BRAIN W/O DYE: CPT

## 2023-11-04 PROCEDURE — 81001 URINALYSIS AUTO W/SCOPE: CPT

## 2023-11-04 PROCEDURE — 72100 X-RAY EXAM L-S SPINE 2/3 VWS: CPT

## 2023-11-04 PROCEDURE — 73502 X-RAY EXAM HIP UNI 2-3 VIEWS: CPT

## 2023-11-04 RX ORDER — METHOCARBAMOL 500 MG/1
500 TABLET, FILM COATED ORAL ONCE
Status: COMPLETED | OUTPATIENT
Start: 2023-11-04 | End: 2023-11-04

## 2023-11-04 RX ORDER — HYDROCODONE BITARTRATE AND ACETAMINOPHEN 5; 325 MG/1; MG/1
1 TABLET ORAL ONCE
Status: COMPLETED | OUTPATIENT
Start: 2023-11-04 | End: 2023-11-04

## 2023-11-04 RX ORDER — BENZONATATE 100 MG/1
100 CAPSULE ORAL PRN
COMMUNITY

## 2023-11-04 RX ORDER — DOCUSATE SODIUM 100 MG/1
100 CAPSULE, LIQUID FILLED ORAL 2 TIMES DAILY
Qty: 10 CAPSULE | Refills: 0 | Status: SHIPPED | OUTPATIENT
Start: 2023-11-04 | End: 2023-11-09

## 2023-11-04 RX ORDER — ESCITALOPRAM OXALATE 5 MG/1
1 TABLET ORAL DAILY
COMMUNITY
Start: 2023-09-12

## 2023-11-04 RX ORDER — HYDROCODONE BITARTRATE AND ACETAMINOPHEN 5; 325 MG/1; MG/1
1 TABLET ORAL 2 TIMES DAILY
COMMUNITY
End: 2023-11-04 | Stop reason: SDUPTHER

## 2023-11-04 RX ORDER — HYDROCODONE BITARTRATE AND ACETAMINOPHEN 5; 325 MG/1; MG/1
1 TABLET ORAL 3 TIMES DAILY
Qty: 6 TABLET | Refills: 0 | Status: SHIPPED | OUTPATIENT
Start: 2023-11-04 | End: 2023-11-06

## 2023-11-04 RX ORDER — SODIUM CHLORIDE, SODIUM LACTATE, POTASSIUM CHLORIDE, AND CALCIUM CHLORIDE .6; .31; .03; .02 G/100ML; G/100ML; G/100ML; G/100ML
500 INJECTION, SOLUTION INTRAVENOUS ONCE
Status: COMPLETED | OUTPATIENT
Start: 2023-11-04 | End: 2023-11-04

## 2023-11-04 RX ADMIN — HYDROCODONE BITARTRATE AND ACETAMINOPHEN 1 TABLET: 5; 325 TABLET ORAL at 12:26

## 2023-11-04 RX ADMIN — METHOCARBAMOL TABLETS 500 MG: 500 TABLET, COATED ORAL at 10:36

## 2023-11-04 RX ADMIN — SODIUM CHLORIDE, POTASSIUM CHLORIDE, SODIUM LACTATE AND CALCIUM CHLORIDE 500 ML: 600; 310; 30; 20 INJECTION, SOLUTION INTRAVENOUS at 10:58

## 2023-11-04 ASSESSMENT — ENCOUNTER SYMPTOMS
TROUBLE SWALLOWING: 0
ABDOMINAL PAIN: 0
COLOR CHANGE: 0
SORE THROAT: 0
COUGH: 0
BACK PAIN: 1
VOMITING: 0
SHORTNESS OF BREATH: 0
NAUSEA: 0

## 2023-11-04 ASSESSMENT — PAIN SCALES - GENERAL
PAINLEVEL_OUTOF10: 10
PAINLEVEL_OUTOF10: 5
PAINLEVEL_OUTOF10: 5
PAINLEVEL_OUTOF10: 10

## 2023-11-04 ASSESSMENT — PAIN DESCRIPTION - LOCATION
LOCATION: OTHER (COMMENT)

## 2023-11-04 ASSESSMENT — PAIN DESCRIPTION - DESCRIPTORS
DESCRIPTORS: SHARP
DESCRIPTORS: SHARP;SORE

## 2023-11-04 ASSESSMENT — PAIN - FUNCTIONAL ASSESSMENT: PAIN_FUNCTIONAL_ASSESSMENT: 0-10

## 2023-11-04 NOTE — ED NOTES
Pt ambulated with walker and 2 staff assist. Pt only able to ambulate a few feet outside of her room before yelling out in pain and requesting to get back into bed. Pt reports she felt pain in her tailbone during ambulation and states she felt \"wobbly\" but denied dizziness. Provider made aware.       [facility] Peoriasana Padgett, [place]  11/04/23 1633

## 2023-11-04 NOTE — ED NOTES
Pt condition stable at discharge. Pt and pt's daughters at bedside verbalize understanding of discharge instructions. This RN and PCA assisted pt into daughter's vehicle.       Aisha Jimenez  11/04/23 1521

## 2023-11-04 NOTE — ED PROVIDER NOTES
ED Attending Attestation Note     Date of evaluation: 11/4/2023    This patient was seen by the advance practice provider. I have seen and examined the patient, agree with the workup, evaluation, management and diagnosis. The care plan has been discussed. I have reviewed the ECG and concur with the TATA's interpretation. My assessment reveals patient at baseline mental status after fall from standing height. Medical decision making: The patient sustained fall from standing height, states she did not hit head, although given her age and Eliquis use, proceeded with CT scan as she is at risk for tearing of bridging veins from a fall even without striking her head. This was negative for traumatic findings. She also had some pain with ranging her left hip and x-ray of this was negative for fracture. Mechanical fall, the patient will be stable for discharge pending normal CBC and renal panel.      Ranjana Jones MD  11/04/23 1016

## 2023-11-17 ENCOUNTER — APPOINTMENT (OUTPATIENT)
Dept: GENERAL RADIOLOGY | Age: 88
End: 2023-11-17
Payer: MEDICARE

## 2023-11-17 ENCOUNTER — HOSPITAL ENCOUNTER (INPATIENT)
Age: 88
LOS: 7 days | Discharge: NURSING, REHAB OR LONG-TERM CARE | End: 2023-11-24
Attending: STUDENT IN AN ORGANIZED HEALTH CARE EDUCATION/TRAINING PROGRAM | Admitting: FAMILY MEDICINE
Payer: MEDICARE

## 2023-11-17 ENCOUNTER — APPOINTMENT (OUTPATIENT)
Dept: CT IMAGING | Age: 88
End: 2023-11-17
Payer: MEDICARE

## 2023-11-17 ENCOUNTER — APPOINTMENT (OUTPATIENT)
Dept: ULTRASOUND IMAGING | Age: 88
End: 2023-11-17
Payer: MEDICARE

## 2023-11-17 DIAGNOSIS — E87.1 HYPONATREMIA: Primary | ICD-10-CM

## 2023-11-17 DIAGNOSIS — G56.00 CARPAL TUNNEL SYNDROME, UNSPECIFIED LATERALITY: ICD-10-CM

## 2023-11-17 PROBLEM — R41.82 AMS (ALTERED MENTAL STATUS): Status: ACTIVE | Noted: 2023-11-17

## 2023-11-17 LAB
ALBUMIN SERPL-MCNC: 3 G/DL (ref 3.4–5)
ALBUMIN/GLOB SERPL: 0.8 {RATIO} (ref 1.1–2.2)
ALP SERPL-CCNC: 265 U/L (ref 40–129)
ALT SERPL-CCNC: 69 U/L (ref 10–40)
AMMONIA PLAS-SCNC: <10 UMOL/L (ref 11–51)
ANION GAP SERPL CALCULATED.3IONS-SCNC: 9 MMOL/L (ref 3–16)
AST SERPL-CCNC: 73 U/L (ref 15–37)
BACTERIA URNS QL MICRO: ABNORMAL /HPF
BASOPHILS # BLD: 0 K/UL (ref 0–0.2)
BASOPHILS NFR BLD: 0.3 %
BILIRUB SERPL-MCNC: 0.9 MG/DL (ref 0–1)
BILIRUB UR QL STRIP.AUTO: NEGATIVE
BUN SERPL-MCNC: 21 MG/DL (ref 7–20)
CALCIUM SERPL-MCNC: 9.3 MG/DL (ref 8.3–10.6)
CHLORIDE SERPL-SCNC: 88 MMOL/L (ref 99–110)
CLARITY UR: CLEAR
CO2 SERPL-SCNC: 26 MMOL/L (ref 21–32)
COLOR UR: YELLOW
CREAT SERPL-MCNC: 0.7 MG/DL (ref 0.6–1.2)
DEPRECATED RDW RBC AUTO: 17.3 % (ref 12.4–15.4)
EOSINOPHIL # BLD: 0.1 K/UL (ref 0–0.6)
EOSINOPHIL NFR BLD: 0.9 %
EPI CELLS #/AREA URNS HPF: ABNORMAL /HPF (ref 0–5)
GFR SERPLBLD CREATININE-BSD FMLA CKD-EPI: >60 ML/MIN/{1.73_M2}
GLUCOSE SERPL-MCNC: 118 MG/DL (ref 70–99)
GLUCOSE UR STRIP.AUTO-MCNC: NEGATIVE MG/DL
HCT VFR BLD AUTO: 26.1 % (ref 36–48)
HCT VFR BLD AUTO: 28.8 % (ref 36–48)
HGB BLD-MCNC: 9.1 G/DL (ref 12–16)
HGB BLD-MCNC: 9.6 G/DL (ref 12–16)
HGB UR QL STRIP.AUTO: NEGATIVE
KETONES UR STRIP.AUTO-MCNC: NEGATIVE MG/DL
LACTATE BLDV-SCNC: 1.7 MMOL/L (ref 0.4–2)
LEUKOCYTE ESTERASE UR QL STRIP.AUTO: NEGATIVE
LYMPHOCYTES # BLD: 0.7 K/UL (ref 1–5.1)
LYMPHOCYTES NFR BLD: 5.6 %
MCH RBC QN AUTO: 28.8 PG (ref 26–34)
MCHC RBC AUTO-ENTMCNC: 33.3 G/DL (ref 31–36)
MCV RBC AUTO: 86.3 FL (ref 80–100)
MONOCYTES # BLD: 0.9 K/UL (ref 0–1.3)
MONOCYTES NFR BLD: 7.4 %
NEUTROPHILS # BLD: 10.3 K/UL (ref 1.7–7.7)
NEUTROPHILS NFR BLD: 85.8 %
NITRITE UR QL STRIP.AUTO: NEGATIVE
OSMOLALITY SERPL: 265 MOSM/KG (ref 278–305)
OSMOLALITY UR: 635 MOSM/KG (ref 390–1070)
PH UR STRIP.AUTO: 6 [PH] (ref 5–8)
PLATELET # BLD AUTO: 488 K/UL (ref 135–450)
PMV BLD AUTO: 7.5 FL (ref 5–10.5)
POTASSIUM SERPL-SCNC: 4.8 MMOL/L (ref 3.5–5.1)
PROCALCITONIN SERPL IA-MCNC: 0.17 NG/ML (ref 0–0.15)
PROT SERPL-MCNC: 6.9 G/DL (ref 6.4–8.2)
PROT UR STRIP.AUTO-MCNC: ABNORMAL MG/DL
RBC # BLD AUTO: 3.33 M/UL (ref 4–5.2)
RBC #/AREA URNS HPF: ABNORMAL /HPF (ref 0–4)
SODIUM SERPL-SCNC: 123 MMOL/L (ref 136–145)
SODIUM UR-SCNC: <20 MMOL/L
SP GR UR STRIP.AUTO: 1.02 (ref 1–1.03)
TROPONIN, HIGH SENSITIVITY: 20 NG/L (ref 0–14)
TROPONIN, HIGH SENSITIVITY: 21 NG/L (ref 0–14)
UA COMPLETE W REFLEX CULTURE PNL UR: ABNORMAL
UA DIPSTICK W REFLEX MICRO PNL UR: YES
URN SPEC COLLECT METH UR: ABNORMAL
UROBILINOGEN UR STRIP-ACNC: 0.2 E.U./DL
WBC # BLD AUTO: 12 K/UL (ref 4–11)
WBC #/AREA URNS HPF: ABNORMAL /HPF (ref 0–5)

## 2023-11-17 PROCEDURE — 70450 CT HEAD/BRAIN W/O DYE: CPT

## 2023-11-17 PROCEDURE — 87077 CULTURE AEROBIC IDENTIFY: CPT

## 2023-11-17 PROCEDURE — 84484 ASSAY OF TROPONIN QUANT: CPT

## 2023-11-17 PROCEDURE — 85018 HEMOGLOBIN: CPT

## 2023-11-17 PROCEDURE — 83605 ASSAY OF LACTIC ACID: CPT

## 2023-11-17 PROCEDURE — 76705 ECHO EXAM OF ABDOMEN: CPT

## 2023-11-17 PROCEDURE — 84295 ASSAY OF SERUM SODIUM: CPT

## 2023-11-17 PROCEDURE — 84145 PROCALCITONIN (PCT): CPT

## 2023-11-17 PROCEDURE — 83935 ASSAY OF URINE OSMOLALITY: CPT

## 2023-11-17 PROCEDURE — 36415 COLL VENOUS BLD VENIPUNCTURE: CPT

## 2023-11-17 PROCEDURE — 84443 ASSAY THYROID STIM HORMONE: CPT

## 2023-11-17 PROCEDURE — 99223 1ST HOSP IP/OBS HIGH 75: CPT | Performed by: FAMILY MEDICINE

## 2023-11-17 PROCEDURE — 73030 X-RAY EXAM OF SHOULDER: CPT

## 2023-11-17 PROCEDURE — 6360000002 HC RX W HCPCS: Performed by: FAMILY MEDICINE

## 2023-11-17 PROCEDURE — 81001 URINALYSIS AUTO W/SCOPE: CPT

## 2023-11-17 PROCEDURE — 1200000000 HC SEMI PRIVATE

## 2023-11-17 PROCEDURE — 87150 DNA/RNA AMPLIFIED PROBE: CPT

## 2023-11-17 PROCEDURE — 82140 ASSAY OF AMMONIA: CPT

## 2023-11-17 PROCEDURE — 87040 BLOOD CULTURE FOR BACTERIA: CPT

## 2023-11-17 PROCEDURE — 2580000003 HC RX 258: Performed by: FAMILY MEDICINE

## 2023-11-17 PROCEDURE — 85025 COMPLETE CBC W/AUTO DIFF WBC: CPT

## 2023-11-17 PROCEDURE — 84300 ASSAY OF URINE SODIUM: CPT

## 2023-11-17 PROCEDURE — 85014 HEMATOCRIT: CPT

## 2023-11-17 PROCEDURE — 83930 ASSAY OF BLOOD OSMOLALITY: CPT

## 2023-11-17 PROCEDURE — 84439 ASSAY OF FREE THYROXINE: CPT

## 2023-11-17 PROCEDURE — 87186 SC STD MICRODIL/AGAR DIL: CPT

## 2023-11-17 PROCEDURE — 80053 COMPREHEN METABOLIC PANEL: CPT

## 2023-11-17 PROCEDURE — 99285 EMERGENCY DEPT VISIT HI MDM: CPT

## 2023-11-17 RX ORDER — DONEPEZIL HYDROCHLORIDE 5 MG/1
5 TABLET, FILM COATED ORAL NIGHTLY
COMMUNITY

## 2023-11-17 RX ORDER — ACETAMINOPHEN 650 MG/1
650 SUPPOSITORY RECTAL EVERY 4 HOURS PRN
Status: DISCONTINUED | OUTPATIENT
Start: 2023-11-17 | End: 2023-11-24 | Stop reason: HOSPADM

## 2023-11-17 RX ORDER — POLYETHYLENE GLYCOL 3350 17 G/17G
17 POWDER, FOR SOLUTION ORAL DAILY PRN
Status: DISCONTINUED | OUTPATIENT
Start: 2023-11-17 | End: 2023-11-24 | Stop reason: HOSPADM

## 2023-11-17 RX ORDER — METOPROLOL SUCCINATE 25 MG/1
12.5 TABLET, EXTENDED RELEASE ORAL DAILY
Status: DISCONTINUED | OUTPATIENT
Start: 2023-11-17 | End: 2023-11-24 | Stop reason: HOSPADM

## 2023-11-17 RX ORDER — ATORVASTATIN CALCIUM 80 MG/1
80 TABLET, FILM COATED ORAL NIGHTLY
Status: DISCONTINUED | OUTPATIENT
Start: 2023-11-17 | End: 2023-11-18

## 2023-11-17 RX ORDER — HYDROCODONE BITARTRATE AND ACETAMINOPHEN 5; 325 MG/1; MG/1
1 TABLET ORAL EVERY 8 HOURS PRN
Status: ON HOLD | COMMUNITY
End: 2023-11-23 | Stop reason: SDUPTHER

## 2023-11-17 RX ORDER — ASPIRIN 81 MG/1
81 TABLET, CHEWABLE ORAL DAILY
Status: DISCONTINUED | OUTPATIENT
Start: 2023-11-17 | End: 2023-11-24 | Stop reason: HOSPADM

## 2023-11-17 RX ORDER — ONDANSETRON 4 MG/1
4 TABLET, FILM COATED ORAL EVERY 8 HOURS PRN
COMMUNITY

## 2023-11-17 RX ORDER — TRIAMCINOLONE ACETONIDE 1 MG/G
CREAM TOPICAL 2 TIMES DAILY
COMMUNITY

## 2023-11-17 RX ORDER — ENOXAPARIN SODIUM 100 MG/ML
40 INJECTION SUBCUTANEOUS DAILY
Status: DISCONTINUED | OUTPATIENT
Start: 2023-11-17 | End: 2023-11-18

## 2023-11-17 RX ORDER — FOLIC ACID 1 MG/1
1 TABLET ORAL DAILY
COMMUNITY

## 2023-11-17 RX ORDER — HYDROCODONE BITARTRATE AND ACETAMINOPHEN 5; 325 MG/1; MG/1
1 TABLET ORAL 2 TIMES DAILY
Status: ON HOLD | COMMUNITY
End: 2023-11-23 | Stop reason: HOSPADM

## 2023-11-17 RX ORDER — SODIUM CHLORIDE 0.9 % (FLUSH) 0.9 %
5-40 SYRINGE (ML) INJECTION PRN
Status: DISCONTINUED | OUTPATIENT
Start: 2023-11-17 | End: 2023-11-24 | Stop reason: HOSPADM

## 2023-11-17 RX ORDER — SODIUM CHLORIDE 9 MG/ML
INJECTION, SOLUTION INTRAVENOUS ONCE
Status: COMPLETED | OUTPATIENT
Start: 2023-11-17 | End: 2023-11-17

## 2023-11-17 RX ORDER — ASPIRIN 300 MG/1
300 SUPPOSITORY RECTAL DAILY
Status: DISCONTINUED | OUTPATIENT
Start: 2023-11-17 | End: 2023-11-24 | Stop reason: HOSPADM

## 2023-11-17 RX ORDER — ACETAMINOPHEN 325 MG/1
650 TABLET ORAL EVERY 4 HOURS PRN
Status: DISCONTINUED | OUTPATIENT
Start: 2023-11-17 | End: 2023-11-24 | Stop reason: HOSPADM

## 2023-11-17 RX ORDER — SODIUM CHLORIDE 0.9 % (FLUSH) 0.9 %
5-40 SYRINGE (ML) INJECTION EVERY 12 HOURS SCHEDULED
Status: DISCONTINUED | OUTPATIENT
Start: 2023-11-17 | End: 2023-11-24 | Stop reason: HOSPADM

## 2023-11-17 RX ORDER — TIZANIDINE 4 MG/1
2 TABLET ORAL EVERY 8 HOURS PRN
Status: DISCONTINUED | OUTPATIENT
Start: 2023-11-17 | End: 2023-11-24 | Stop reason: HOSPADM

## 2023-11-17 RX ORDER — ESCITALOPRAM OXALATE 5 MG/1
5 TABLET ORAL DAILY
Status: DISCONTINUED | OUTPATIENT
Start: 2023-11-17 | End: 2023-11-20

## 2023-11-17 RX ORDER — SODIUM CHLORIDE 9 MG/ML
INJECTION, SOLUTION INTRAVENOUS PRN
Status: DISCONTINUED | OUTPATIENT
Start: 2023-11-17 | End: 2023-11-24 | Stop reason: HOSPADM

## 2023-11-17 RX ADMIN — SODIUM CHLORIDE: 9 INJECTION, SOLUTION INTRAVENOUS at 18:52

## 2023-11-17 RX ADMIN — ENOXAPARIN SODIUM 40 MG: 100 INJECTION SUBCUTANEOUS at 21:32

## 2023-11-17 RX ADMIN — CEFTRIAXONE 1000 MG: 1 INJECTION, POWDER, FOR SOLUTION INTRAMUSCULAR; INTRAVENOUS at 19:01

## 2023-11-17 ASSESSMENT — PAIN - FUNCTIONAL ASSESSMENT: PAIN_FUNCTIONAL_ASSESSMENT: NONE - DENIES PAIN

## 2023-11-17 ASSESSMENT — PAIN SCALES - GENERAL: PAINLEVEL_OUTOF10: 0

## 2023-11-17 NOTE — ED NOTES
ED TO INPATIENT SBAR HANDOFF    Patient Name: Brooklyn Ayon   :  1932  80 y.o. MRN:  [id number]  Preferred Name  n/a  ED Room #:  B21/B21-21  Family/Caregiver Present no   Restraints no   Sitter no   Sepsis Risk Score Sepsis Risk Score: 3.7    Situation  Code Status: Full Code No additional code details. Allergies: Patient has no known allergies. Weight: No data found. Arrived from: nursing home  Chief Complaint:   Chief Complaint   Patient presents with    Extremity Weakness     Pt family member reports rapid decline with her ability to do her ADL's for 2 weeks now. Pt is unable to get herself up off of the toilet      Hospital Problem/Diagnosis:  Principal Problem:    AMS (altered mental status)  Resolved Problems:    * No resolved hospital problems. *    Imaging:   US ABDOMEN LIMITED   Final Result      1. Gallbladder sludge, but no shadowing gallstones, and no sonographic evidence   of acute cholecystitis. CT HEAD WO CONTRAST   Final Result      1. No evidence of acute intracranial hemorrhage or mass effect. 2.  Stable small left occipital infarct. 3.  Moderate chronic small vessel ischemic disease and mild diffuse atrophy.          Vascular carotid duplex bilateral    (Results Pending)   MRI brain without contrast    (Results Pending)     Abnormal labs:   Abnormal Labs Reviewed   COMPREHENSIVE METABOLIC PANEL W/ REFLEX TO MG FOR LOW K - Abnormal; Notable for the following components:       Result Value    Sodium 123 (*)     Chloride 88 (*)     Glucose 118 (*)     BUN 21 (*)     Albumin 3.0 (*)     Albumin/Globulin Ratio 0.8 (*)     Alkaline Phosphatase 265 (*)     ALT 69 (*)     AST 73 (*)     All other components within normal limits   CBC WITH AUTO DIFFERENTIAL - Abnormal; Notable for the following components:    WBC 12.0 (*)     RBC 3.33 (*)     Hemoglobin 9.6 (*)     Hematocrit 28.8 (*)     RDW 17.3 (*)     Platelets 407 (*)     Neutrophils Absolute 10.3 (*)     Lymphocytes

## 2023-11-17 NOTE — PROGRESS NOTES
4 Eyes Admission Assessment     I agree as the admission nurse that 2 RN's have performed a thorough Head to Toe Skin Assessment on the patient. ALL assessment sites listed below have been assessed on admission. Areas assessed by both nurses  [x]   Head, Face, and Ears   [x]   Shoulders, Back, and Chest  [x]   Arms, Elbows, and Hands   [x]   Coccyx, Sacrum, and Ischium  [x]   Legs, Feet, and Heels     Redness to adriana area, redness/ open areas stage 1 to buttocks, scattered bruising, All extremities swelling and bruising,  Scabbing flaking skin on sacrum . Does the Patient have Skin Breakdown?   Yes a wound was noted on the Admission Assessment and an LDA was Initiated documentation include the Adriana-wound, Wound Assessment, Measurements, Dressing Treatment, Drainage, and Color\",         Cash Prevention initiated:  Yes   Wound Care Orders initiated:  Yes      WOC nurse consulted for Pressure Injury (Stage 3,4, Unstageable, DTI, NWPT, and Complex wounds) or Cash score 18 or lower:  No      Nurse 1 eSignature: Electronically signed by Mikey Collazo RN on 11/17/23 at 6:20 PM EST    **SHARE this note so that the co-signing nurse is able to place an eSignature**    Nurse 2 eSignature: Electronically signed by Maggie Parry RN on 11/17/23 at 7:40 PM EST

## 2023-11-17 NOTE — H&P
Hospital Medicine History & Physical      Date of Admission: 11/17/2023    Date of Service:  Pt seen/examined on 11/17/2023     [x]Admitted to Inpatient with expected LOS greater than two midnights due to medical therapy. []Placed in Observation status. Chief Admission Complaint:  [address]    Presenting Admission History:      80 y.o. female who presented to [facility] with [address] PMHx significant for Hypothyroid, SSS s/p pacemaker, previous CVA. Ginny Hagen      Presents from nursing facility, patient is not sure where she is. Patient denies any chest pain/pressure, abd pain, nausea, vomiting, unilateral weakness, numbness/tingling. Reports not eating as much and no BM in several days. Denies dysuria, blood in  stool, vomiting up blood. Per ER, per NH she has not been herself for last several days and today had difficulty getting of the toilette. In the ER     CT head shows No evidence of acute intracranial hemorrhage or mass effect./Stable small left occipital infarct./Moderate chronic small vessel ischemic disease and mild diffuse atrophy. Na 123  Glucose 118  BUN 21    Alk Phos 265  ALT 69/AST 73    WBC 12.0  Hgb 9.6    US abd shows Gallbladder sludge, but no shadowing gallstones, and no sonographic evidence of acute cholecystitis.     UA not obtained at time of admission    Assessment/Plan:      Current Principal Problem:  AMS (altered mental status)    AMS/elevated LFTs/leukocytosis  - r/o metabolic/infectious/neurogenic/cardiogenic causes  - MRI head  - carotid US  - 2d echo  - trend troponin  - initiate rocephin for presumed uti  - f/u UA  - pro calcitonin/blood cultures, if negative and ua negative may deescalate antibiotics  - Ammonia levels  - TSH levels, restart synthroid once levels obtained   - Lipitor (if LFTS increase stop Lipitor)  - aspirin    Hyponatremia  - may be cause of AMS  - IVFs  - monitor Na, do not want to increase by more than 4 units in 24 hrs    Anemia  - fecal occult  - hold daily as needed for Constipation 9/19/23   Jenny Ma, APRN - CNP   apixaban (ELIQUIS) 2.5 MG TABS tablet Take 1 tablet by mouth 2 times daily 8/30/23   Ino Temple MD   diclofenac sodium (VOLTAREN) 1 % GEL Apply 4 g topically every 12 hours as needed for Pain    Ino Temple MD   bisacodyl (DULCOLAX) 10 MG suppository Place 1 suppository rectally daily as needed for Constipation    Ino Temple MD   magnesium hydroxide (MILK OF MAGNESIA) 400 MG/5ML suspension Take 30 mLs by mouth daily as needed for Constipation    Ino Temple MD   tiZANidine (ZANAFLEX) 2 MG tablet Take 1 tablet by mouth as needed    Ino Temple MD   acetaminophen (TYLENOL) 325 MG tablet Take 1 tablet by mouth as needed for Pain    ProviderIno MD   sodium phosphate (FLEET) 7-19 GM/118ML Place 1 enema rectally daily as needed    ProviderIno MD   metoprolol succinate (TOPROL XL) 25 MG extended release tablet Take 0.5 tablets by mouth daily 8/28/23   Arely Bautista MD   Cholecalciferol (VITAMIN D PO) Take 2,000 Units by mouth daily    Ino Temple MD   levothyroxine (SYNTHROID) 50 MCG tablet Take 1 tablet by mouth daily    ProviderIno MD       Labs: Personally reviewed and interpreted for clinical significance. Recent Labs     11/17/23  1313   WBC 12.0*   HGB 9.6*   HCT 28.8*   *     Recent Labs     11/17/23  1313   *   K 4.8   CL 88*   CO2 26   BUN 21*   CREATININE 0.7   CALCIUM 9.3     No results for input(s): \"PROBNP\", \"TROPHS\" in the last 72 hours. No results for input(s): \"LABA1C\" in the last 72 hours. Recent Labs     11/17/23  1313   AST 73*   ALT 69*   BILITOT 0.9   ALKPHOS 265*     No results for input(s): \"INR\", \"LACTA\", \"TSH\" in the last 72 hours.      Mg Rader MD

## 2023-11-17 NOTE — PROGRESS NOTES
Patient arrived to [phone]. AO x 2-3. Room air. VSS. Daughter at bedside, Call light within reach. Oriented to room, and call light. Bed alarm on, locked and low. Purewick in place.  Tele placed

## 2023-11-18 LAB
ALBUMIN SERPL-MCNC: 2.1 G/DL (ref 3.4–5)
ALBUMIN SERPL-MCNC: 2.6 G/DL (ref 3.4–5)
ALBUMIN SERPL-MCNC: 2.7 G/DL (ref 3.4–5)
ALBUMIN SERPL-MCNC: 2.7 G/DL (ref 3.4–5)
ALBUMIN SERPL-MCNC: 2.8 G/DL (ref 3.4–5)
ALBUMIN/GLOB SERPL: 0.8 {RATIO} (ref 1.1–2.2)
ALP SERPL-CCNC: 222 U/L (ref 40–129)
ALP SERPL-CCNC: 230 U/L (ref 40–129)
ALT SERPL-CCNC: 51 U/L (ref 10–40)
ALT SERPL-CCNC: 57 U/L (ref 10–40)
ANION GAP SERPL CALCULATED.3IONS-SCNC: 13 MMOL/L (ref 3–16)
ANION GAP SERPL CALCULATED.3IONS-SCNC: 8 MMOL/L (ref 3–16)
ANION GAP SERPL CALCULATED.3IONS-SCNC: 9 MMOL/L (ref 3–16)
ANION GAP SERPL CALCULATED.3IONS-SCNC: 9 MMOL/L (ref 3–16)
AST SERPL-CCNC: 45 U/L (ref 15–37)
AST SERPL-CCNC: 55 U/L (ref 15–37)
BILIRUB DIRECT SERPL-MCNC: 0.3 MG/DL (ref 0–0.3)
BILIRUB INDIRECT SERPL-MCNC: 0.4 MG/DL (ref 0–1)
BILIRUB SERPL-MCNC: 0.7 MG/DL (ref 0–1)
BILIRUB SERPL-MCNC: 0.8 MG/DL (ref 0–1)
BUN SERPL-MCNC: 13 MG/DL (ref 7–20)
BUN SERPL-MCNC: 14 MG/DL (ref 7–20)
BUN SERPL-MCNC: 14 MG/DL (ref 7–20)
BUN SERPL-MCNC: 15 MG/DL (ref 7–20)
CALCIUM SERPL-MCNC: 8.3 MG/DL (ref 8.3–10.6)
CALCIUM SERPL-MCNC: 8.5 MG/DL (ref 8.3–10.6)
CALCIUM SERPL-MCNC: 8.6 MG/DL (ref 8.3–10.6)
CALCIUM SERPL-MCNC: 8.8 MG/DL (ref 8.3–10.6)
CHLORIDE SERPL-SCNC: 91 MMOL/L (ref 99–110)
CHLORIDE SERPL-SCNC: 91 MMOL/L (ref 99–110)
CHLORIDE SERPL-SCNC: 92 MMOL/L (ref 99–110)
CHLORIDE SERPL-SCNC: 92 MMOL/L (ref 99–110)
CHOLEST SERPL-MCNC: 69 MG/DL (ref 0–199)
CO2 SERPL-SCNC: 16 MMOL/L (ref 21–32)
CO2 SERPL-SCNC: 21 MMOL/L (ref 21–32)
CO2 SERPL-SCNC: 22 MMOL/L (ref 21–32)
CO2 SERPL-SCNC: 23 MMOL/L (ref 21–32)
CREAT SERPL-MCNC: 0.5 MG/DL (ref 0.6–1.2)
CREAT SERPL-MCNC: 0.6 MG/DL (ref 0.6–1.2)
CREAT SERPL-MCNC: 0.6 MG/DL (ref 0.6–1.2)
CREAT SERPL-MCNC: 0.7 MG/DL (ref 0.6–1.2)
DEPRECATED RDW RBC AUTO: 17.3 % (ref 12.4–15.4)
EKG ATRIAL RATE: 60 BPM
EKG DIAGNOSIS: NORMAL
EKG P-R INTERVAL: 258 MS
EKG Q-T INTERVAL: 474 MS
EKG QRS DURATION: 120 MS
EKG QTC CALCULATION (BAZETT): 474 MS
EKG R AXIS: -80 DEGREES
EKG T AXIS: 87 DEGREES
EKG VENTRICULAR RATE: 60 BPM
GFR SERPLBLD CREATININE-BSD FMLA CKD-EPI: >60 ML/MIN/{1.73_M2}
GLUCOSE SERPL-MCNC: 109 MG/DL (ref 70–99)
GLUCOSE SERPL-MCNC: 124 MG/DL (ref 70–99)
GLUCOSE SERPL-MCNC: 127 MG/DL (ref 70–99)
GLUCOSE SERPL-MCNC: 85 MG/DL (ref 70–99)
HCT VFR BLD AUTO: 26.4 % (ref 36–48)
HCT VFR BLD AUTO: 28.3 % (ref 36–48)
HDLC SERPL-MCNC: 20 MG/DL (ref 40–60)
HGB BLD-MCNC: 7.2 G/DL (ref 12–16)
HGB BLD-MCNC: 8.9 G/DL (ref 12–16)
LDLC SERPL CALC-MCNC: 37 MG/DL
MCH RBC QN AUTO: 29 PG (ref 26–34)
MCHC RBC AUTO-ENTMCNC: 33.6 G/DL (ref 31–36)
MCV RBC AUTO: 86.2 FL (ref 80–100)
OSMOLALITY SERPL: 268 MOSM/KG (ref 278–305)
PHOSPHATE SERPL-MCNC: 2.9 MG/DL (ref 2.5–4.9)
PHOSPHATE SERPL-MCNC: 3 MG/DL (ref 2.5–4.9)
PHOSPHATE SERPL-MCNC: 3.1 MG/DL (ref 2.5–4.9)
PLATELET # BLD AUTO: 460 K/UL (ref 135–450)
PMV BLD AUTO: 7.9 FL (ref 5–10.5)
POTASSIUM SERPL-SCNC: 4.2 MMOL/L (ref 3.5–5.1)
POTASSIUM SERPL-SCNC: 4.2 MMOL/L (ref 3.5–5.1)
POTASSIUM SERPL-SCNC: 4.4 MMOL/L (ref 3.5–5.1)
POTASSIUM SERPL-SCNC: 4.4 MMOL/L (ref 3.5–5.1)
PROT SERPL-MCNC: 6.2 G/DL (ref 6.4–8.2)
PROT SERPL-MCNC: 6.2 G/DL (ref 6.4–8.2)
RBC # BLD AUTO: 3.06 M/UL (ref 4–5.2)
REPORT: NORMAL
SODIUM SERPL-SCNC: 120 MMOL/L (ref 136–145)
SODIUM SERPL-SCNC: 120 MMOL/L (ref 136–145)
SODIUM SERPL-SCNC: 121 MMOL/L (ref 136–145)
SODIUM SERPL-SCNC: 122 MMOL/L (ref 136–145)
SODIUM SERPL-SCNC: 124 MMOL/L (ref 136–145)
T4 FREE SERPL-MCNC: 1.4 NG/DL (ref 0.9–1.8)
TRIGL SERPL-MCNC: 58 MG/DL (ref 0–150)
TROPONIN, HIGH SENSITIVITY: 23 NG/L (ref 0–14)
TSH SERPL DL<=0.005 MIU/L-ACNC: 8.76 UIU/ML (ref 0.27–4.2)
VLDLC SERPL CALC-MCNC: 12 MG/DL
WBC # BLD AUTO: 9.5 K/UL (ref 4–11)

## 2023-11-18 PROCEDURE — 97530 THERAPEUTIC ACTIVITIES: CPT

## 2023-11-18 PROCEDURE — 1200000000 HC SEMI PRIVATE

## 2023-11-18 PROCEDURE — 97166 OT EVAL MOD COMPLEX 45 MIN: CPT

## 2023-11-18 PROCEDURE — 93005 ELECTROCARDIOGRAM TRACING: CPT | Performed by: FAMILY MEDICINE

## 2023-11-18 PROCEDURE — 92610 EVALUATE SWALLOWING FUNCTION: CPT

## 2023-11-18 PROCEDURE — 2580000003 HC RX 258: Performed by: INTERNAL MEDICINE

## 2023-11-18 PROCEDURE — 6370000000 HC RX 637 (ALT 250 FOR IP): Performed by: INTERNAL MEDICINE

## 2023-11-18 PROCEDURE — 93010 ELECTROCARDIOGRAM REPORT: CPT | Performed by: INTERNAL MEDICINE

## 2023-11-18 PROCEDURE — 6370000000 HC RX 637 (ALT 250 FOR IP): Performed by: FAMILY MEDICINE

## 2023-11-18 PROCEDURE — 85027 COMPLETE CBC AUTOMATED: CPT

## 2023-11-18 PROCEDURE — 85018 HEMOGLOBIN: CPT

## 2023-11-18 PROCEDURE — 6360000002 HC RX W HCPCS: Performed by: INTERNAL MEDICINE

## 2023-11-18 PROCEDURE — 97535 SELF CARE MNGMENT TRAINING: CPT

## 2023-11-18 PROCEDURE — 80061 LIPID PANEL: CPT

## 2023-11-18 PROCEDURE — 85014 HEMATOCRIT: CPT

## 2023-11-18 PROCEDURE — 2580000003 HC RX 258: Performed by: STUDENT IN AN ORGANIZED HEALTH CARE EDUCATION/TRAINING PROGRAM

## 2023-11-18 PROCEDURE — 6360000002 HC RX W HCPCS: Performed by: FAMILY MEDICINE

## 2023-11-18 PROCEDURE — 80053 COMPREHEN METABOLIC PANEL: CPT

## 2023-11-18 PROCEDURE — 83036 HEMOGLOBIN GLYCOSYLATED A1C: CPT

## 2023-11-18 PROCEDURE — 2580000003 HC RX 258: Performed by: FAMILY MEDICINE

## 2023-11-18 PROCEDURE — 36415 COLL VENOUS BLD VENIPUNCTURE: CPT

## 2023-11-18 PROCEDURE — 83930 ASSAY OF BLOOD OSMOLALITY: CPT

## 2023-11-18 RX ORDER — ATORVASTATIN CALCIUM 40 MG/1
40 TABLET, FILM COATED ORAL NIGHTLY
Status: DISCONTINUED | OUTPATIENT
Start: 2023-11-18 | End: 2023-11-24 | Stop reason: HOSPADM

## 2023-11-18 RX ORDER — LEVOTHYROXINE SODIUM 0.05 MG/1
50 TABLET ORAL DAILY
Status: DISCONTINUED | OUTPATIENT
Start: 2023-11-18 | End: 2023-11-19

## 2023-11-18 RX ORDER — DONEPEZIL HYDROCHLORIDE 5 MG/1
5 TABLET, FILM COATED ORAL NIGHTLY
Status: DISCONTINUED | OUTPATIENT
Start: 2023-11-18 | End: 2023-11-24 | Stop reason: HOSPADM

## 2023-11-18 RX ORDER — SODIUM CHLORIDE 9 MG/ML
INJECTION, SOLUTION INTRAVENOUS CONTINUOUS
Status: DISCONTINUED | OUTPATIENT
Start: 2023-11-18 | End: 2023-11-19

## 2023-11-18 RX ADMIN — ASPIRIN 81 MG: 81 TABLET, CHEWABLE ORAL at 08:47

## 2023-11-18 RX ADMIN — ACETAMINOPHEN 650 MG: 325 TABLET ORAL at 18:59

## 2023-11-18 RX ADMIN — METOPROLOL SUCCINATE 12.5 MG: 25 TABLET, EXTENDED RELEASE ORAL at 08:47

## 2023-11-18 RX ADMIN — SODIUM CHLORIDE, PRESERVATIVE FREE 10 ML: 5 INJECTION INTRAVENOUS at 21:23

## 2023-11-18 RX ADMIN — ENOXAPARIN SODIUM 40 MG: 100 INJECTION SUBCUTANEOUS at 08:47

## 2023-11-18 RX ADMIN — DONEPEZIL HYDROCHLORIDE 5 MG: 5 TABLET, FILM COATED ORAL at 21:18

## 2023-11-18 RX ADMIN — ATORVASTATIN CALCIUM 40 MG: 40 TABLET, FILM COATED ORAL at 21:18

## 2023-11-18 RX ADMIN — APIXABAN 2.5 MG: 2.5 TABLET, FILM COATED ORAL at 21:19

## 2023-11-18 RX ADMIN — ACETAMINOPHEN 650 MG: 325 TABLET ORAL at 00:31

## 2023-11-18 RX ADMIN — LEVOTHYROXINE SODIUM 50 MCG: 0.05 TABLET ORAL at 17:19

## 2023-11-18 RX ADMIN — ESCITALOPRAM OXALATE 5 MG: 5 TABLET ORAL at 08:47

## 2023-11-18 RX ADMIN — CEFTRIAXONE SODIUM 2000 MG: 2 INJECTION, POWDER, FOR SOLUTION INTRAMUSCULAR; INTRAVENOUS at 17:19

## 2023-11-18 RX ADMIN — SODIUM CHLORIDE: 9 INJECTION, SOLUTION INTRAVENOUS at 21:24

## 2023-11-18 RX ADMIN — ATORVASTATIN CALCIUM 80 MG: 80 TABLET, FILM COATED ORAL at 00:31

## 2023-11-18 ASSESSMENT — PAIN DESCRIPTION - FREQUENCY: FREQUENCY: INTERMITTENT

## 2023-11-18 ASSESSMENT — PAIN SCALES - GENERAL
PAINLEVEL_OUTOF10: 0
PAINLEVEL_OUTOF10: 3
PAINLEVEL_OUTOF10: 0

## 2023-11-18 ASSESSMENT — PAIN SCALES - WONG BAKER: WONGBAKER_NUMERICALRESPONSE: 0

## 2023-11-18 ASSESSMENT — PAIN - FUNCTIONAL ASSESSMENT: PAIN_FUNCTIONAL_ASSESSMENT: PREVENTS OR INTERFERES SOME ACTIVE ACTIVITIES AND ADLS

## 2023-11-18 ASSESSMENT — PAIN DESCRIPTION - DIRECTION: RADIATING_TOWARDS: DENIES

## 2023-11-18 ASSESSMENT — PAIN DESCRIPTION - ORIENTATION: ORIENTATION: LEFT

## 2023-11-18 ASSESSMENT — PAIN DESCRIPTION - LOCATION: LOCATION: ARM

## 2023-11-18 ASSESSMENT — PAIN DESCRIPTION - PAIN TYPE: TYPE: ACUTE PAIN

## 2023-11-18 ASSESSMENT — PAIN DESCRIPTION - ONSET: ONSET: ON-GOING

## 2023-11-18 ASSESSMENT — PAIN DESCRIPTION - DESCRIPTORS: DESCRIPTORS: ACHING;DISCOMFORT

## 2023-11-18 NOTE — PROGRESS NOTES
Department of Pharmacy  Notification of Positive Blood Culture Results    Notification received from laboratory of positive blood culture results.     Organism(s) detected: Streptococcus species DNA  Applicable Antimicrobial Resistance Genes: None noted at this time    Recommended change(s) to current antimicrobial regimen: Initiate empiric antibiotic therapy with ceftriaxone (active against almost all strains of streptococci)    Recommendations will be reviewed with Dr. Kobe Briones via [facility] on 11/18/23 at 4:20 PM.    Bozena PriceD, [facility] Specialist - Emergency Dept  Wireless: [id number]  11/18/2023 4:20 PM

## 2023-11-18 NOTE — PLAN OF CARE
Problem: ABCDS Injury Assessment  Goal: Absence of physical injury  11/18/2023 0730 by Luis A Agosto RN  Outcome: Progressing  Flowsheets (Taken 11/18/2023 0730)  Absence of Physical Injury: Implement safety measures based on patient assessment  11/17/2023 2211 by Chanelle Pollard RN  Outcome: Progressing     Problem: Safety - Adult  Goal: Free from fall injury  11/18/2023 0730 by Luis A Agosto RN  Outcome: Progressing  Flowsheets  Taken 11/18/2023 0730 by Luis A Agosto RN  Free From Fall Injury:   Instruct family/caregiver on patient safety   Based on caregiver fall risk screen, instruct family/caregiver to ask for assistance with transferring infant if caregiver noted to have fall risk factors  Taken 11/18/2023 0100 by Chanelle Pollard RN  Free From Fall Injury: Instruct family/caregiver on patient safety  11/17/2023 2211 by Chanelle Pollard RN  Outcome: Progressing

## 2023-11-18 NOTE — PROGRESS NOTES
V2.0  [facility] Hospitalist Progress Note      Name:  Maida Rinne /Age/Sex: 1932  (80 y.o. female)   MRN & CSN:  [id number] & [id number] Encounter Date/Time: 2023 1:59 PM EST    Location:  [facility]303[facility]01 PCP: Macho Cota MD       Hospital Day: 2    Assessment and Plan:   Maida Rinne is a 80 y.o. female with pmh of hypothyroidism, sick sinus syndrome s/p pacemaker, who presents with AMS (altered mental status)      Plan:    Acute metabolic encephalopathy  Most likely due to hyponatremia and dehydration  Overall sluggish but alert and oriented  Continue to monitor    Hyponatremia  Sodium 123 mmol/liter on presentation. Clinically volume depleted. Urine sodium less than 20, urine osmolality 635. Likely due to poor eating and drinking  Nephrology on board. Follow recommendations. Closely monitor sodium level    Leukocytosis  Likely hemoconcentration   WBC and hemoglobin both have dropped in the morning lab. Continue to monitor  UA sterile. Stop ceftriaxone. History of depression  On Lexapro, continue    History of A-fib, on Eliquis and metoprolol, continue  History of hyperlipidemia,      Diet ADULT DIET; Regular; Low Fat/Low Chol/High Fiber/2 gm Na; No Added Salt (3-4 gm); 1500 ml   DVT Prophylaxis [] Lovenox, []  Heparin, [] SCDs, [] Ambulation,  [] Eliquis, [] Xarelto  [] Coumadin   Code Status Full Code   Disposition From:   Expected Disposition:   Estimated Date of Discharge:   Patient requires continued admission due to    Surrogate Decision Maker/ POA      Personally reviewed Lab Studies and Imaging     Discussed management of the case with nephrology     EKG interpreted personally and results atrial paced rhythm    Imaging that was interpreted personally includes CT head without contrast and results no evidence of acute hemorrhage        Subjective:     Chief Complaint: Extremity Weakness (Pt family member reports rapid decline with her ability to do her ADL's for 2 weeks now.

## 2023-11-18 NOTE — PROGRESS NOTES
Occupational Therapy  Facility/Department: [address]  Occupational Therapy Initial Assessment/Tx Note    Name: Brooklyn Ayon  : 1932  MRN: [id number]  Date of Service: 2023    Assessment: Pt from assisted living where she requires Ax1 for functional mobility with walker and most ADLs. Son denies dementia, but pt demos typical disorientation and short term memory deficits during session. Her L arm is swollen and painful (not just shoulder as chart indicates). Skin integrity is compromised to sacrum/buttocks/periarea and pt would benefit from specialty mattress, frequent repositioning and toileting checks. Pt only tolerated about 5 min sitting EOB today with c/o increasing weakness/fatigue and elevated BP. Will continue OT during admit. Recommend SNF at d/c. Discharge Recommendations:  Subacute/Skilled Nursing Facility  [facility]: 12  OT Equipment Recommendations  Equipment Needed: No     Treatment Diagnosis: Decreased activity tolerance, impaired ADLs and mobility      Assessment   Performance deficits / Impairments: Decreased functional mobility ; Decreased ADL status; Decreased strength;Decreased cognition;Decreased endurance;Decreased balance  Treatment Diagnosis: Decreased activity tolerance, impaired ADLs and mobility  Decision Making: Medium Complexity  REQUIRES OT FOLLOW-UP: Yes  Activity Tolerance  Activity Tolerance: Patient limited by fatigue  Activity Tolerance Comments: See Sitting assessment section        Plan    Occupational Therapy Plan  Times Per Week: 2-5x  Times Per Day:  Once a day  Current Treatment Recommendations: Strengthening, Balance training, Functional mobility training, Endurance training, Cognitive reorientation, Pain management, Safety education & training, Equipment evaluation, education, & procurement, Patient/Caregiver education & training, Self-Care / ADL     Restrictions  Position Activity Restriction  Other position/activity restrictions: up as

## 2023-11-18 NOTE — PLAN OF CARE
Problem: Safety - Adult  Goal: Free from fall injury  Outcome: Progressing     Problem: Skin/Tissue Integrity  Goal: Absence of new skin breakdown  Description: 1. Monitor for areas of redness and/or skin breakdown  2. Assess vascular access sites hourly  3. Every 4-6 hours minimum:  Change oxygen saturation probe site  4. Every 4-6 hours:  If on nasal continuous positive airway pressure, respiratory therapy assess nares and determine need for appliance change or resting period. Outcome: Progressing     Problem: ABCDS Injury Assessment  Goal: Absence of physical injury  Outcome: Progressing     Problem: Discharge Planning  Goal: Discharge to home or other facility with appropriate resources  Outcome: Progressing     Continue with plan of care.

## 2023-11-18 NOTE — PROGRESS NOTES
Speech Language Pathology  Facility/Department:[address] TELEMETRY  Bedside Swallow Evaluation & DC                                                       Name: Molly Tyler  : 1932  MRN: [id number]    Patient Diagnosis(es):   Patient Active Problem List    Diagnosis Date Noted    AMS (altered mental status) 2023    Intractable back pain 2023    Pneumonia, unspecified organism 09/15/2023    Diplopia 2023    Left pontine stroke ([address]) 2023    Internuclear ophthalmoplegia of left eye 2023    Essential hypertension 2023    Other hyperlipidemia 2023    Pain in limb 10/03/2014    Dermatophytosis of nail 10/03/2014    Trigger finger, acquired 2014    Carpal tunnel syndrome 2014     Past Medical History:   Diagnosis Date    Arthritis     CAD (coronary artery disease)     CTS (carpal tunnel syndrome)     DVT (deep venous thrombosis) ([address]) 1966    post partum    Hyperlipidemia     Hypertension     Hypothyroidism     Lumbar disc disease     Nonrheumatic aortic valve insufficiency     OAB (overactive bladder)     Pacemaker     Paroxysmal atrial fibrillation (HCC)     Psoriasis     SSS (sick sinus syndrome) ([address])     Venous stasis      Past Surgical History:   Procedure Laterality Date    APPENDECTOMY      BREAST SURGERY      lumpectomy - benign    CARPAL TUNNEL RELEASE Right 2014    COLONOSCOPY  2004    Sigmoid diverticulosis    COLONOSCOPY      FINGER TRIGGER RELEASE  04/10/2012    left middle finger, and left CTR    HERNIA REPAIR  1980    rt inguinal    JOINT REPLACEMENT  2011    THR right    JOINT REPLACEMENT      TKR left    PACEMAKER INSERTION  2011    [employer] pacer. Generator is safe. Leads are NOT SAFE. THYROIDECTOMY, PARTIAL         Reason for Referral:  Molly Tyler  was referred for a Speech Therapy evaluation to assess swallow function.     History of Present Illness  Per admitting H&P:

## 2023-11-18 NOTE — PROGRESS NOTES
Swallow screen completed at this time, patient with no noted s/s of impaired swallowing or aspiration at this time. Diet advanced per protocol.

## 2023-11-18 NOTE — CONSULTS
Nephrology Consult Note          [facility]      [email or web address]         Phone: [phone]      11/18/2023 9:47 AM     Patient: Evan Jimenez [id number]  [id number]  Date of Admit: 11/17/2023 LOS: 1 days    Plan:  Check renal panel q 4 hour and check s osmolality  Check TSH  Hold off IVF  for now  Free water restriction. Advised nurse to call me with labs and will plan accordingly. D/W patient's son. Thank you for allowing us to participate in this patient's care    In case of any question please call us at our 24 hour answering service [phone] or from 7 AM to 5 PM via [employer] or cell number    Shayla Santana MD  [facility]  [facility] [address]  Fax: [phone]  Office: [phone]     Assessment & Plan     Renal function:  Cr stable. Electrolytes:    Hyponatremia. On Lexapro  Not on Diuretics  Patient has hypothyroidism. Urine Na < 20, urine osm was 635  Appear to be due to mild IV volume depletion and decreased solute intake. Lab Results   Component Value Date    CREATININE 0.6 11/18/2023    BUN 13 11/18/2023     (L) 11/18/2023    K 4.2 11/18/2023    CL 92 (L) 11/18/2023    CO2 23 11/18/2023      Lab Results   Component Value Date    CALCIUM 8.6 11/18/2023    CAION 1.25 11/12/2014    PHOS 2.7 08/27/2023         Acid/Base status:  Stable      Volume status/BP:  BP mild elevated    Does not appear volume overloaded. May be mild IV volume depleted side. Hematology:   Lab Results   Component Value Date    WBC 9.5 11/18/2023    HGB 8.9 (L) 11/18/2023    HCT 26.4 (L) 11/18/2023    MCV 86.2 11/18/2023     (H) 11/18/2023           Reason for Consult     Reason for consult: Hyponatremia. Chief complaint:   Chief Complaint   Patient presents with    Extremity Weakness     Pt family member reports rapid decline with her ability to do her ADL's for 2 weeks now.  Pt is unable to get herself up off of

## 2023-11-18 NOTE — PROGRESS NOTES
Patient has [address] pacemaker. The generator to the device is CONDITIONAL for MRI but the leads are NOT SAFE. Patient cannot have MRI.

## 2023-11-19 LAB
ALBUMIN SERPL-MCNC: 2.2 G/DL (ref 3.4–5)
ALBUMIN SERPL-MCNC: 2.5 G/DL (ref 3.4–5)
ALBUMIN SERPL-MCNC: 2.5 G/DL (ref 3.4–5)
ALBUMIN SERPL-MCNC: 2.6 G/DL (ref 3.4–5)
ALBUMIN SERPL-MCNC: 2.6 G/DL (ref 3.4–5)
ALBUMIN SERPL-MCNC: 2.7 G/DL (ref 3.4–5)
ALBUMIN/GLOB SERPL: 0.7 {RATIO} (ref 1.1–2.2)
ALP SERPL-CCNC: 226 U/L (ref 40–129)
ALT SERPL-CCNC: 57 U/L (ref 10–40)
ANION GAP SERPL CALCULATED.3IONS-SCNC: 10 MMOL/L (ref 3–16)
ANION GAP SERPL CALCULATED.3IONS-SCNC: 6 MMOL/L (ref 3–16)
ANION GAP SERPL CALCULATED.3IONS-SCNC: 8 MMOL/L (ref 3–16)
ANION GAP SERPL CALCULATED.3IONS-SCNC: 9 MMOL/L (ref 3–16)
AST SERPL-CCNC: 57 U/L (ref 15–37)
BILIRUB SERPL-MCNC: 0.9 MG/DL (ref 0–1)
BUN SERPL-MCNC: 13 MG/DL (ref 7–20)
BUN SERPL-MCNC: 14 MG/DL (ref 7–20)
BUN SERPL-MCNC: 16 MG/DL (ref 7–20)
BUN SERPL-MCNC: 18 MG/DL (ref 7–20)
BUN SERPL-MCNC: 21 MG/DL (ref 7–20)
BUN SERPL-MCNC: 22 MG/DL (ref 7–20)
BUN SERPL-MCNC: 26 MG/DL (ref 7–20)
BUN SERPL-MCNC: 41 MG/DL (ref 7–20)
CALCIUM SERPL-MCNC: 8.3 MG/DL (ref 8.3–10.6)
CALCIUM SERPL-MCNC: 8.4 MG/DL (ref 8.3–10.6)
CALCIUM SERPL-MCNC: 8.6 MG/DL (ref 8.3–10.6)
CALCIUM SERPL-MCNC: 8.8 MG/DL (ref 8.3–10.6)
CHLORIDE SERPL-SCNC: 90 MMOL/L (ref 99–110)
CHLORIDE SERPL-SCNC: 91 MMOL/L (ref 99–110)
CHLORIDE SERPL-SCNC: 93 MMOL/L (ref 99–110)
CO2 SERPL-SCNC: 21 MMOL/L (ref 21–32)
CO2 SERPL-SCNC: 21 MMOL/L (ref 21–32)
CO2 SERPL-SCNC: 22 MMOL/L (ref 21–32)
CO2 SERPL-SCNC: 23 MMOL/L (ref 21–32)
CO2 SERPL-SCNC: 23 MMOL/L (ref 21–32)
CO2 SERPL-SCNC: 24 MMOL/L (ref 21–32)
CREAT SERPL-MCNC: 0.6 MG/DL (ref 0.6–1.2)
CREAT SERPL-MCNC: <0.5 MG/DL (ref 0.6–1.2)
DEPRECATED RDW RBC AUTO: 17.7 % (ref 12.4–15.4)
GFR SERPLBLD CREATININE-BSD FMLA CKD-EPI: >60 ML/MIN/{1.73_M2}
GLUCOSE SERPL-MCNC: 103 MG/DL (ref 70–99)
GLUCOSE SERPL-MCNC: 105 MG/DL (ref 70–99)
GLUCOSE SERPL-MCNC: 106 MG/DL (ref 70–99)
GLUCOSE SERPL-MCNC: 112 MG/DL (ref 70–99)
GLUCOSE SERPL-MCNC: 115 MG/DL (ref 70–99)
GLUCOSE SERPL-MCNC: 116 MG/DL (ref 70–99)
GLUCOSE SERPL-MCNC: 120 MG/DL (ref 70–99)
GLUCOSE SERPL-MCNC: 122 MG/DL (ref 70–99)
HCT VFR BLD AUTO: 24.4 % (ref 36–48)
HCT VFR BLD AUTO: 26.8 % (ref 36–48)
HCT VFR BLD AUTO: 29 % (ref 36–48)
HGB BLD-MCNC: 8.7 G/DL (ref 12–16)
HGB BLD-MCNC: 9 G/DL (ref 12–16)
HGB BLD-MCNC: 9.4 G/DL (ref 12–16)
MCH RBC QN AUTO: 28.5 PG (ref 26–34)
MCHC RBC AUTO-ENTMCNC: 33.6 G/DL (ref 31–36)
MCV RBC AUTO: 84.7 FL (ref 80–100)
PHOSPHATE SERPL-MCNC: 2.5 MG/DL (ref 2.5–4.9)
PHOSPHATE SERPL-MCNC: 2.6 MG/DL (ref 2.5–4.9)
PHOSPHATE SERPL-MCNC: 2.8 MG/DL (ref 2.5–4.9)
PHOSPHATE SERPL-MCNC: 2.9 MG/DL (ref 2.5–4.9)
PLATELET # BLD AUTO: 487 K/UL (ref 135–450)
PMV BLD AUTO: 7.7 FL (ref 5–10.5)
POTASSIUM SERPL-SCNC: 4 MMOL/L (ref 3.5–5.1)
POTASSIUM SERPL-SCNC: 4 MMOL/L (ref 3.5–5.1)
POTASSIUM SERPL-SCNC: 4.1 MMOL/L (ref 3.5–5.1)
POTASSIUM SERPL-SCNC: 4.1 MMOL/L (ref 3.5–5.1)
POTASSIUM SERPL-SCNC: 4.2 MMOL/L (ref 3.5–5.1)
POTASSIUM SERPL-SCNC: 4.3 MMOL/L (ref 3.5–5.1)
POTASSIUM SERPL-SCNC: 4.4 MMOL/L (ref 3.5–5.1)
POTASSIUM SERPL-SCNC: 4.4 MMOL/L (ref 3.5–5.1)
PROT SERPL-MCNC: 6.4 G/DL (ref 6.4–8.2)
RBC # BLD AUTO: 3.16 M/UL (ref 4–5.2)
SODIUM SERPL-SCNC: 121 MMOL/L (ref 136–145)
SODIUM SERPL-SCNC: 121 MMOL/L (ref 136–145)
SODIUM SERPL-SCNC: 122 MMOL/L (ref 136–145)
SODIUM SERPL-SCNC: 123 MMOL/L (ref 136–145)
SODIUM SERPL-SCNC: 123 MMOL/L (ref 136–145)
SODIUM SERPL-SCNC: 124 MMOL/L (ref 136–145)
WBC # BLD AUTO: 10.5 K/UL (ref 4–11)

## 2023-11-19 PROCEDURE — 85027 COMPLETE CBC AUTOMATED: CPT

## 2023-11-19 PROCEDURE — 97162 PT EVAL MOD COMPLEX 30 MIN: CPT

## 2023-11-19 PROCEDURE — 97116 GAIT TRAINING THERAPY: CPT

## 2023-11-19 PROCEDURE — 2580000003 HC RX 258: Performed by: INTERNAL MEDICINE

## 2023-11-19 PROCEDURE — 6360000002 HC RX W HCPCS: Performed by: INTERNAL MEDICINE

## 2023-11-19 PROCEDURE — 80069 RENAL FUNCTION PANEL: CPT

## 2023-11-19 PROCEDURE — 85014 HEMATOCRIT: CPT

## 2023-11-19 PROCEDURE — 85018 HEMOGLOBIN: CPT

## 2023-11-19 PROCEDURE — 80053 COMPREHEN METABOLIC PANEL: CPT

## 2023-11-19 PROCEDURE — 1200000000 HC SEMI PRIVATE

## 2023-11-19 PROCEDURE — 2580000003 HC RX 258: Performed by: FAMILY MEDICINE

## 2023-11-19 PROCEDURE — 6370000000 HC RX 637 (ALT 250 FOR IP): Performed by: STUDENT IN AN ORGANIZED HEALTH CARE EDUCATION/TRAINING PROGRAM

## 2023-11-19 PROCEDURE — 6370000000 HC RX 637 (ALT 250 FOR IP): Performed by: INTERNAL MEDICINE

## 2023-11-19 PROCEDURE — 6370000000 HC RX 637 (ALT 250 FOR IP): Performed by: FAMILY MEDICINE

## 2023-11-19 PROCEDURE — 36415 COLL VENOUS BLD VENIPUNCTURE: CPT

## 2023-11-19 PROCEDURE — 97530 THERAPEUTIC ACTIVITIES: CPT

## 2023-11-19 PROCEDURE — 97535 SELF CARE MNGMENT TRAINING: CPT

## 2023-11-19 PROCEDURE — 87040 BLOOD CULTURE FOR BACTERIA: CPT

## 2023-11-19 RX ORDER — LEVOTHYROXINE SODIUM 0.05 MG/1
50 TABLET ORAL DAILY
Status: DISCONTINUED | OUTPATIENT
Start: 2023-11-20 | End: 2023-11-24 | Stop reason: HOSPADM

## 2023-11-19 RX ORDER — SODIUM CHLORIDE 9 MG/ML
INJECTION, SOLUTION INTRAVENOUS CONTINUOUS
Status: ACTIVE | OUTPATIENT
Start: 2023-11-20 | End: 2023-11-20

## 2023-11-19 RX ORDER — LEVOTHYROXINE SODIUM 0.07 MG/1
75 TABLET ORAL DAILY
Status: DISCONTINUED | OUTPATIENT
Start: 2023-11-20 | End: 2023-11-19

## 2023-11-19 RX ADMIN — Medication 30 G: at 21:08

## 2023-11-19 RX ADMIN — ESCITALOPRAM OXALATE 5 MG: 5 TABLET ORAL at 08:12

## 2023-11-19 RX ADMIN — APIXABAN 2.5 MG: 2.5 TABLET, FILM COATED ORAL at 21:08

## 2023-11-19 RX ADMIN — ACETAMINOPHEN 650 MG: 325 TABLET ORAL at 18:04

## 2023-11-19 RX ADMIN — ATORVASTATIN CALCIUM 40 MG: 40 TABLET, FILM COATED ORAL at 21:09

## 2023-11-19 RX ADMIN — LEVOTHYROXINE SODIUM 50 MCG: 0.05 TABLET ORAL at 08:12

## 2023-11-19 RX ADMIN — METOPROLOL SUCCINATE 12.5 MG: 25 TABLET, EXTENDED RELEASE ORAL at 08:12

## 2023-11-19 RX ADMIN — TIZANIDINE 2 MG: 4 TABLET ORAL at 18:04

## 2023-11-19 RX ADMIN — Medication 30 G: at 06:03

## 2023-11-19 RX ADMIN — CEFTRIAXONE SODIUM 2000 MG: 2 INJECTION, POWDER, FOR SOLUTION INTRAMUSCULAR; INTRAVENOUS at 16:59

## 2023-11-19 RX ADMIN — DONEPEZIL HYDROCHLORIDE 5 MG: 5 TABLET, FILM COATED ORAL at 21:09

## 2023-11-19 RX ADMIN — ASPIRIN 81 MG: 81 TABLET, CHEWABLE ORAL at 08:11

## 2023-11-19 RX ADMIN — SODIUM CHLORIDE, PRESERVATIVE FREE 10 ML: 5 INJECTION INTRAVENOUS at 21:07

## 2023-11-19 RX ADMIN — APIXABAN 2.5 MG: 2.5 TABLET, FILM COATED ORAL at 08:11

## 2023-11-19 ASSESSMENT — PAIN SCALES - GENERAL
PAINLEVEL_OUTOF10: 0
PAINLEVEL_OUTOF10: 0
PAINLEVEL_OUTOF10: 7
PAINLEVEL_OUTOF10: 0
PAINLEVEL_OUTOF10: 0
PAINLEVEL_OUTOF10: 3
PAINLEVEL_OUTOF10: 0

## 2023-11-19 ASSESSMENT — PAIN - FUNCTIONAL ASSESSMENT: PAIN_FUNCTIONAL_ASSESSMENT: ACTIVITIES ARE NOT PREVENTED

## 2023-11-19 ASSESSMENT — PAIN DESCRIPTION - LOCATION: LOCATION: BACK

## 2023-11-19 ASSESSMENT — PAIN DESCRIPTION - DIRECTION: RADIATING_TOWARDS: DENIES

## 2023-11-19 ASSESSMENT — PAIN DESCRIPTION - ORIENTATION: ORIENTATION: MID;LEFT

## 2023-11-19 ASSESSMENT — PAIN DESCRIPTION - DESCRIPTORS: DESCRIPTORS: ACHING;DISCOMFORT

## 2023-11-19 ASSESSMENT — PAIN DESCRIPTION - FREQUENCY: FREQUENCY: INTERMITTENT

## 2023-11-19 ASSESSMENT — PAIN DESCRIPTION - PAIN TYPE: TYPE: ACUTE PAIN

## 2023-11-19 ASSESSMENT — PAIN DESCRIPTION - ONSET: ONSET: ON-GOING

## 2023-11-19 NOTE — PLAN OF CARE
Problem: Safety - Adult  Goal: Free from fall injury  Outcome: Progressing  Flowsheets (Taken 11/18/2023 5390)  Free From Fall Injury: Based on caregiver fall risk screen, instruct family/caregiver to ask for assistance with transferring infant if caregiver noted to have fall risk factors  Note: Patient is alert to self with intermittent confusion, ensure all safety measures are in place, bed lock in lowest position, bed alarm on and call light within patient reach. Try to re-orient patient and instructed on using call light if she needs anything. Hourly rounds done. Problem: Skin/Tissue Integrity  Goal: Absence of new skin breakdown  Description: 1. Monitor for areas of redness and/or skin breakdown  2. Assess vascular access sites hourly  3. Every 4-6 hours minimum:  Change oxygen saturation probe site  4. Every 4-6 hours:  If on nasal continuous positive airway pressure, respiratory therapy assess nares and determine need for appliance change or resting period. Outcome: Progressing  Note:  With existing pressure sore stage 2 on the buttocks, position change every 2 hours and ensure back is dry and clean.      Problem: Discharge Planning  Goal: Discharge to home or other facility with appropriate resources  Outcome: Progressing

## 2023-11-19 NOTE — PROGRESS NOTES
Occupational Therapy  Facility/Department: [address]  Occupational Therapy Progress Note    Name: Haydee Jackson  : 1932  MRN: [id number]  Date of Service: 2023    Discharge Recommendations:  Haydee Jackson scored a 12/24 on the AM-PAC ADL Inpatient form. Current research shows that an AM-PAC score of 17 or less is typically not associated with a discharge to the patient's home setting. Based on the patient's AM-PAC score and their current ADL deficits, it is recommended that the patient have 3-5 sessions per week of Occupational Therapy at d/c to increase the patient's independence. Please see assessment section for further patient specific details. If patient discharges prior to next session this note will serve as a discharge summary. Please see below for the latest assessment towards goals. Subacute/Skilled Nursing Facility  OT Equipment Recommendations  Other: defer to next level of care       Patient Diagnosis(es): The encounter diagnosis was Hyponatremia. Past Medical History:  has a past medical history of Arthritis, CAD (coronary artery disease), CTS (carpal tunnel syndrome), DVT (deep venous thrombosis) ([address]), Hyperlipidemia, Hypertension, Hypothyroidism, Lumbar disc disease, Nonrheumatic aortic valve insufficiency, OAB (overactive bladder), Pacemaker, Paroxysmal atrial fibrillation ([address]), Psoriasis, SSS (sick sinus syndrome) ([address]), and Venous stasis. Past Surgical History:  has a past surgical history that includes Appendectomy; Thyroidectomy, partial; Breast surgery; Pacemaker insertion (2011); Finger trigger release (04/10/2012); joint replacement (2011); joint replacement; Carpal tunnel release (Right, 2014); Colonoscopy (2004); hernia repair (1980); and Colonoscopy.     Treatment Diagnosis: Decreased activity tolerance, impaired ADLs and mobility      Assessment   Performance deficits / Impairments: Decreased functional mobility walker)  Transfer Assistance: Needs assistance (assist x1)  Active : No  Additional Comments: Questionable historian. Reports  passed away recently. Has 4 children. Reports she can walk about a mile or so. Has been doing therapy. Objective                Safety Devices  Type of Devices: Left in chair;Nurse notified; Chair alarm in place;Call light within reach  Balance  Sitting: Impaired (SBA)  Standing: Impaired (mod A of 2 with walker, posterior and L lean)  Toilet Transfers  Toilet - Technique: Ambulating (a few steps)  Equipment Used:  (simulated 3 in1 commode)  Toilet Transfer: 2 Person assistance; Moderate assistance (mod A of 2)     ADL  Feeding: Setup;Verbal cueing  Grooming: Setup;Verbal cueing (to wash face while seated)        Bed mobility  Supine to Sit: Moderate assistance (head of bed elevated, use of bed rail, cues, increased time/effort)  Scooting: Maximal assistance (scooting to edge of bed)  Transfers  Sit to stand: 2 Person assistance; Moderate assistance (cues for hand placement)  Stand to sit: 2 Person assistance; Moderate assistance (cues for safe positioning of body and hand placemetn)  Vision  Vision: Within Functional Limits  Hearing  Hearing: Exceptions to [facility]  Hearing Exceptions: Hard of hearing/hearing concerns  Cognition  Overall Cognitive Status: Exceptions  Arousal/Alertness: Delayed responses to stimuli  Following Commands: Follows one step commands with increased time; Follows one step commands with repetition  Attention Span: Appears intact  Memory: Decreased recall of recent events;Decreased short term memory  Problem Solving: Assistance required to generate solutions;Decreased awareness of errors  Insights: Decreased awareness of deficits  Initiation: Requires cues for all  Sequencing: Requires cues for some  Orientation  Overall Orientation Status: Impaired  Orientation Level: Oriented to person;Disoriented to time;Disoriented to situation;Oriented to place

## 2023-11-19 NOTE — CARE COORDINATION
NAKUL spoke with Meli at [facility]. Pt is in Assisted Living. She has been to SNF in past. She will pull and review and check to see if she has beds available if pt wants/needs SNF. She will call on Monday morning to let NAKUL know.

## 2023-11-19 NOTE — PROGRESS NOTES
Physical Therapy  Facility/Department: [address] [place] [address]  Physical Therapy Initial Assessment    Name: Tracey Gil  : 1932  MRN: [id number]  Date of Service: 2023    Discharge Recommendations:Caryl Lam scored a  on the AM-PAC short mobility form. Current research shows that an AM-PAC score of 17 or less is typically not associated with a discharge to the patient's home setting. Based on the patient's AM-PAC score and their current functional mobility deficits, it is recommended that the patient have 3-5 sessions per week of Physical Therapy at d/c to increase the patient's independence. Please see assessment section for further patient specific details. If patient discharges prior to next session this note will serve as a discharge summary. Please see below for the latest assessment towards goals. PT Equipment Recommendations  Equipment Needed: No (defer to next level of care)      Patient Diagnosis(es): The encounter diagnosis was Hyponatremia. Past Medical History:  has a past medical history of Arthritis, CAD (coronary artery disease), CTS (carpal tunnel syndrome), DVT (deep venous thrombosis) ([address]), Hyperlipidemia, Hypertension, Hypothyroidism, Lumbar disc disease, Nonrheumatic aortic valve insufficiency, OAB (overactive bladder), Pacemaker, Paroxysmal atrial fibrillation ([address]), Psoriasis, SSS (sick sinus syndrome) ([address]), and Venous stasis. Past Surgical History:  has a past surgical history that includes Appendectomy; Thyroidectomy, partial; Breast surgery; Pacemaker insertion (2011); Finger trigger release (04/10/2012); joint replacement (2011); joint replacement; Carpal tunnel release (Right, 2014); Colonoscopy (2004); hernia repair (1980); and Colonoscopy. Assessment   Assessment: 79 yo admitted 23 for AMS. Pt demo mobility below her reported baseline of assist of 1 with RW in ALU.  Pt required mod assist x2 for transfers/gait this am and she is still confused. Pt hopeful to return to ALU but open to SNF if needed. Pt would benefit from SNF at discharge to maximize her functional independence prior to d/c home. Treatment Diagnosis: mobility impairment due to AMS  Decision Making: Medium Complexity  Requires PT Follow-Up: Yes  Activity Tolerance  Activity Tolerance: Patient tolerated evaluation without incident;Patient limited by fatigue;Treatment limited secondary to decreased cognition     Plan   Physical Therapy Plan  General Plan:  (2-5)  Current Treatment Recommendations: Functional mobility training, Transfer training, Gait training, Balance training  Safety Devices  Type of Devices: Left in chair, Nurse notified, Chair alarm in place, Call light within reach     Restrictions  Position Activity Restriction  Other position/activity restrictions: up as tolerated     Subjective   Pain: pain: denies pain  General  Chart Reviewed: Yes  Additional Pertinent Hx: 80 y.o. F admitted 11/17 from care facility with AMS, decreased mobility, decreased PO and BM. + elevated LFTs, hyponatremia, leukocytosis, shoulder swelling (XR unremarkable for acute). Family / Caregiver Present: No  Follows Commands: Within Functional Limits  Subjective  Subjective: Pt found supine in bed and agreeable to PT with encouragement. \" I'm so tired. \"         Social/Functional History  Social/Functional History  Type of Home: Assisted living ([facility])  Home Layout: One level  Bathroom Shower/Tub: Walk-in shower  Bathroom Toilet: Handicap height  ADL Assistance: Needs assistance (bathing, dressing, toileting)  Ambulation Assistance: Needs assistance (assist x1 with walker)  Transfer Assistance: Needs assistance (assist x1)  Active : No  Additional Comments: Questionable historian. Reports  passed away recently. Has 4 children. Reports she can walk about a mile or so. Has been doing therapy.     Vision/Hearing  Vision  Vision: Within

## 2023-11-19 NOTE — PROGRESS NOTES
V2.0  [facility] Hospital[facility] Progress Note      Name:  Stephanie Vazquez /Age/Sex: 1932  (80 y.o. female)   MRN & CSN:  [id number] & [id number] Encounter Date/Time: 2023 1:59 PM EST    Location:  [facility]- PCP: Rosey Lipscomb MD       Hospital Day: 3    Assessment and Plan:   Stephanie Vazquez is a 80 y.o. female with pmh of hypothyroidism, sick sinus syndrome s/p pacemaker, who presents with AMS (altered mental status)      Plan:    Acute metabolic encephalopathy  Most likely due to hyponatremia and dehydration  Overall sluggish but alert and oriented-improving      Hyponatremia  Sodium 123 mmol/liter on presentation. Clinically volume depleted. Urine sodium less than 20, urine osmolality 635. Likely due to poor eating and drinking  Nephrology on board. Follow recommendations. Closely monitor sodium level  On gentle hydration with normal saline as well as fluid restriction of 1500 cc  Continue to follow BMP every 4 hours. Streptococcus bacteremia  Follow final cultures  Unclear source  Continue ceftriaxone 2 g  Repeat blood cultures    History of depression  On Lexapro, continue    History of A-fib, on Eliquis and metoprolol, continue  History of hyperlipidemia,    Left upper extremity swelling  Reporting pain along with swelling  No evidence of cellulitis on the extremity. Will obtain DVT ultrasound    Diet ADULT DIET; Regular; Low Fat/Low Chol/High Fiber/2 gm Na;  No Added Salt (3-4 gm); 1500 ml   DVT Prophylaxis [] Lovenox, []  Heparin, [] SCDs, [] Ambulation,  [] Eliquis, [] Xarelto  [] Coumadin   Code Status Full Code   Disposition From:   Expected Disposition:   Estimated Date of Discharge:   Patient requires continued admission due to    Surrogate Decision Maker/ POA      Personally reviewed Lab Studies and Imaging     Discussed management of the case with nephrology     EKG interpreted personally and results atrial paced rhythm    Imaging that was interpreted personally includes CT parenchymal echogenicity. No hydronephrosis. Other: No free fluid. 1.  Gallbladder sludge, but no shadowing gallstones, and no sonographic evidence of acute cholecystitis. CT HEAD WO CONTRAST    Result Date: 11/17/2023  EXAM: CT HEAD WO CONTRAST INDICATION: Altered mental status TECHNIQUE: Axial thin section CT images of the head were obtained without contrast. Sagittal and coronal 2-D multiplanar reconstructions were performed at the scanner. Up-to-date CT equipment and radiation dose reduction techniques were employed. COMPARISON: Head CT dated 11/4/23 FINDINGS: The diagnostic quality of the examination is adequate. Brain parenchyma: Moderate nonspecific patchy and confluent white matter low-attenuation. 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 Ventricles and extraaxial spaces: Proportionate enlargement of ventricles and sulci consistent with mild atrophy. Orbits, paranasal sinuses, mastoids: Prior cataract surgery. Clear paranasal sinuses. Clear mastoid air cells. Extracranial soft tissues: Normal. Calvarium and skull base: No acute calvarial abnormality. Other: Atherosclerotic vascular calcifications. 1.  No evidence of acute intracranial hemorrhage or mass effect. 2.  Stable small left occipital infarct. 3.  Moderate chronic small vessel ischemic disease and mild diffuse atrophy.        Electronically signed by Selina Gallagher MD on 11/19/2023 at 12:43 PM

## 2023-11-19 NOTE — PROGRESS NOTES
[address] [facility]   [facility]  [phone]           Na dropped to 121  Got ure-na 30 g this AM  Will update to ure-na 30 g BID           [facility] would like to thank you for the opportunity to serve this patient. Please call with any questions or concerns.     Francie Councilman, MD   [facility]  [facility], [facility] [address]  Fax: [phone]  Office: [phone]

## 2023-11-19 NOTE — PLAN OF CARE
Problem: ABCDS Injury Assessment  Goal: Absence of physical injury  Outcome: Progressing  Flowsheets (Taken 11/19/2023 0905)  Absence of Physical Injury: Implement safety measures based on patient assessment     Problem: Safety - Adult  Goal: Free from fall injury  11/19/2023 0905 by Carson Kinney RN  Outcome: Progressing  Flowsheets (Taken 11/19/2023 0905)  Free From Fall Injury:   Instruct family/caregiver on patient safety   Based on caregiver fall risk screen, instruct family/caregiver to ask for assistance with transferring infant if caregiver noted to have fall risk factors  11/18/2023 2248 by John Lomeli RN  Outcome: Progressing  Flowsheets (Taken 11/18/2023 2248)  Free From Fall Injury: Based on caregiver fall risk screen, instruct family/caregiver to ask for assistance with transferring infant if caregiver noted to have fall risk factors  Note: Patient is alert to self with intermittent confusion, ensure all safety measures are in place, bed lock in lowest position, bed alarm on and call light within patient reach. Try to re-orient patient and instructed on using call light if she needs anything. Hourly rounds done.

## 2023-11-19 NOTE — PROGRESS NOTES
Nephrology Consult Note          [facility]      [email or web address]         Phone: [phone]      11/19/2023 5:38 AM     Patient: Austen Schaffer [id number]  [id number]  Date of Admit: 11/17/2023 LOS: 2 days    Interval History and Plan:  Patient seen at bedside  Comfortable  /69  On room air  Urine output 450 ml  Cr stable  Na 123  TSH was 8.7 with normal free T4      Continue Normal saline  Oral Urea x 1   Renal panel q 4 hour  Free water restriction. Advised nurse to call me with labs and will plan accordingly. Thank you for allowing us to participate in this patient's care    In case of any question please call us at our 24 hour answering service [phone] or from 7 AM to 5 PM via [employer] or cell number    Delores Jain MD  [facility]  [facility] [address]  Fax: [phone]  Office: [phone]     Assessment & Plan     Renal function:  Cr stable. Electrolytes:    Hyponatremia. On Lexapro  Not on Diuretics  Patient has hypothyroidism. Urine Na < 20, urine osm was 635  Appear to be due to mild IV volume depletion and decreased solute intake. Lab Results   Component Value Date    CREATININE 0.6 11/19/2023    BUN 13 11/19/2023     (L) 11/19/2023    K 4.4 11/19/2023    CL 93 (L) 11/19/2023    CO2 24 11/19/2023      Lab Results   Component Value Date    CALCIUM 8.3 11/19/2023    CAION 1.25 11/12/2014    PHOS 2.9 11/19/2023         Acid/Base status:  Stable      Volume status/BP:  BP mild elevated    Does not appear volume overloaded. May be mild IV volume depleted side. Hematology:   Lab Results   Component Value Date    WBC 9.5 11/18/2023    HGB 8.7 (L) 11/19/2023    HCT 24.4 (L) 11/19/2023    MCV 86.2 11/18/2023     (H) 11/18/2023           Reason for Consult     Reason for consult: Hyponatremia.      Chief complaint:   Chief Complaint   Patient presents with    Extremity Weakness     Pt family

## 2023-11-20 ENCOUNTER — APPOINTMENT (OUTPATIENT)
Dept: VASCULAR LAB | Age: 88
End: 2023-11-20
Payer: MEDICARE

## 2023-11-20 PROBLEM — I49.5 SSS (SICK SINUS SYNDROME) (HCC): Status: ACTIVE | Noted: 2023-11-20

## 2023-11-20 PROBLEM — Z95.0 PACEMAKER: Status: ACTIVE | Noted: 2023-11-20

## 2023-11-20 PROBLEM — E87.1 HYPONATREMIA: Status: ACTIVE | Noted: 2023-11-20

## 2023-11-20 LAB
ALBUMIN SERPL-MCNC: 2.2 G/DL (ref 3.4–5)
ALBUMIN SERPL-MCNC: 2.4 G/DL (ref 3.4–5)
ALBUMIN/GLOB SERPL: 0.7 {RATIO} (ref 1.1–2.2)
ALP SERPL-CCNC: 176 U/L (ref 40–129)
ALT SERPL-CCNC: 42 U/L (ref 10–40)
ANION GAP SERPL CALCULATED.3IONS-SCNC: 7 MMOL/L (ref 3–16)
ANION GAP SERPL CALCULATED.3IONS-SCNC: 8 MMOL/L (ref 3–16)
AST SERPL-CCNC: 36 U/L (ref 15–37)
BACTERIA BLD CULT ORG #2: ABNORMAL
BACTERIA BLD CULT ORG #2: ABNORMAL
BACTERIA BLD CULT: ABNORMAL
BILIRUB SERPL-MCNC: 0.7 MG/DL (ref 0–1)
BUN SERPL-MCNC: 33 MG/DL (ref 7–20)
BUN SERPL-MCNC: 33 MG/DL (ref 7–20)
BUN SERPL-MCNC: 44 MG/DL (ref 7–20)
BUN SERPL-MCNC: 44 MG/DL (ref 7–20)
CALCIUM SERPL-MCNC: 8.3 MG/DL (ref 8.3–10.6)
CALCIUM SERPL-MCNC: 8.5 MG/DL (ref 8.3–10.6)
CALCIUM SERPL-MCNC: 8.6 MG/DL (ref 8.3–10.6)
CALCIUM SERPL-MCNC: 8.7 MG/DL (ref 8.3–10.6)
CHLORIDE SERPL-SCNC: 93 MMOL/L (ref 99–110)
CO2 SERPL-SCNC: 22 MMOL/L (ref 21–32)
CO2 SERPL-SCNC: 24 MMOL/L (ref 21–32)
CREAT SERPL-MCNC: 0.5 MG/DL (ref 0.6–1.2)
CREAT SERPL-MCNC: 0.6 MG/DL (ref 0.6–1.2)
CREAT SERPL-MCNC: 0.6 MG/DL (ref 0.6–1.2)
CREAT SERPL-MCNC: 0.7 MG/DL (ref 0.6–1.2)
DEPRECATED RDW RBC AUTO: 18.1 % (ref 12.4–15.4)
EST. AVERAGE GLUCOSE BLD GHB EST-MCNC: 96.8 MG/DL
GFR SERPLBLD CREATININE-BSD FMLA CKD-EPI: >60 ML/MIN/{1.73_M2}
GLUCOSE SERPL-MCNC: 100 MG/DL (ref 70–99)
GLUCOSE SERPL-MCNC: 113 MG/DL (ref 70–99)
GLUCOSE SERPL-MCNC: 91 MG/DL (ref 70–99)
GLUCOSE SERPL-MCNC: 92 MG/DL (ref 70–99)
HBA1C MFR BLD: 5 %
HCT VFR BLD AUTO: 26.5 % (ref 36–48)
HGB BLD-MCNC: 8.8 G/DL (ref 12–16)
MCH RBC QN AUTO: 28.6 PG (ref 26–34)
MCHC RBC AUTO-ENTMCNC: 33.3 G/DL (ref 31–36)
MCV RBC AUTO: 86 FL (ref 80–100)
ORGANISM: ABNORMAL
PHOSPHATE SERPL-MCNC: 2.6 MG/DL (ref 2.5–4.9)
PHOSPHATE SERPL-MCNC: 2.7 MG/DL (ref 2.5–4.9)
PHOSPHATE SERPL-MCNC: 3 MG/DL (ref 2.5–4.9)
PLATELET # BLD AUTO: 456 K/UL (ref 135–450)
PMV BLD AUTO: 7.6 FL (ref 5–10.5)
POTASSIUM SERPL-SCNC: 4.1 MMOL/L (ref 3.5–5.1)
POTASSIUM SERPL-SCNC: 4.3 MMOL/L (ref 3.5–5.1)
PROT SERPL-MCNC: 6 G/DL (ref 6.4–8.2)
RBC # BLD AUTO: 3.08 M/UL (ref 4–5.2)
SODIUM SERPL-SCNC: 123 MMOL/L (ref 136–145)
SODIUM SERPL-SCNC: 124 MMOL/L (ref 136–145)
SODIUM SERPL-SCNC: 125 MMOL/L (ref 136–145)
SODIUM SERPL-SCNC: 125 MMOL/L (ref 136–145)
WBC # BLD AUTO: 8.5 K/UL (ref 4–11)

## 2023-11-20 PROCEDURE — 36415 COLL VENOUS BLD VENIPUNCTURE: CPT

## 2023-11-20 PROCEDURE — 2580000003 HC RX 258: Performed by: FAMILY MEDICINE

## 2023-11-20 PROCEDURE — 97530 THERAPEUTIC ACTIVITIES: CPT

## 2023-11-20 PROCEDURE — 80053 COMPREHEN METABOLIC PANEL: CPT

## 2023-11-20 PROCEDURE — 97116 GAIT TRAINING THERAPY: CPT

## 2023-11-20 PROCEDURE — 2580000003 HC RX 258: Performed by: NURSE PRACTITIONER

## 2023-11-20 PROCEDURE — 2580000003 HC RX 258: Performed by: INTERNAL MEDICINE

## 2023-11-20 PROCEDURE — 6360000002 HC RX W HCPCS: Performed by: INTERNAL MEDICINE

## 2023-11-20 PROCEDURE — 6370000000 HC RX 637 (ALT 250 FOR IP): Performed by: STUDENT IN AN ORGANIZED HEALTH CARE EDUCATION/TRAINING PROGRAM

## 2023-11-20 PROCEDURE — 85027 COMPLETE CBC AUTOMATED: CPT

## 2023-11-20 PROCEDURE — 6370000000 HC RX 637 (ALT 250 FOR IP): Performed by: INTERNAL MEDICINE

## 2023-11-20 PROCEDURE — 99223 1ST HOSP IP/OBS HIGH 75: CPT | Performed by: INTERNAL MEDICINE

## 2023-11-20 PROCEDURE — 6370000000 HC RX 637 (ALT 250 FOR IP): Performed by: FAMILY MEDICINE

## 2023-11-20 PROCEDURE — 93971 EXTREMITY STUDY: CPT

## 2023-11-20 PROCEDURE — 1200000000 HC SEMI PRIVATE

## 2023-11-20 PROCEDURE — 97535 SELF CARE MNGMENT TRAINING: CPT

## 2023-11-20 RX ORDER — SODIUM CHLORIDE 1 G/1
1 TABLET ORAL 2 TIMES DAILY WITH MEALS
Status: DISCONTINUED | OUTPATIENT
Start: 2023-11-20 | End: 2023-11-24

## 2023-11-20 RX ORDER — SODIUM CHLORIDE 1 G/1
1 TABLET ORAL
Status: DISCONTINUED | OUTPATIENT
Start: 2023-11-20 | End: 2023-11-20

## 2023-11-20 RX ADMIN — Medication 30 G: at 08:30

## 2023-11-20 RX ADMIN — APIXABAN 2.5 MG: 2.5 TABLET, FILM COATED ORAL at 21:24

## 2023-11-20 RX ADMIN — LEVOTHYROXINE SODIUM 50 MCG: 50 TABLET ORAL at 06:04

## 2023-11-20 RX ADMIN — SODIUM CHLORIDE, PRESERVATIVE FREE 10 ML: 5 INJECTION INTRAVENOUS at 21:21

## 2023-11-20 RX ADMIN — SODIUM CHLORIDE 1 G: 1 TABLET ORAL at 13:13

## 2023-11-20 RX ADMIN — Medication 30 G: at 21:24

## 2023-11-20 RX ADMIN — ACETAMINOPHEN 650 MG: 325 TABLET ORAL at 15:58

## 2023-11-20 RX ADMIN — TIZANIDINE 2 MG: 4 TABLET ORAL at 15:58

## 2023-11-20 RX ADMIN — ATORVASTATIN CALCIUM 40 MG: 40 TABLET, FILM COATED ORAL at 21:24

## 2023-11-20 RX ADMIN — POLYETHYLENE GLYCOL 3350 17 G: 17 POWDER, FOR SOLUTION ORAL at 06:13

## 2023-11-20 RX ADMIN — APIXABAN 2.5 MG: 2.5 TABLET, FILM COATED ORAL at 08:30

## 2023-11-20 RX ADMIN — SODIUM CHLORIDE: 9 INJECTION, SOLUTION INTRAVENOUS at 00:08

## 2023-11-20 RX ADMIN — SODIUM CHLORIDE 1 G: 1 TABLET ORAL at 17:49

## 2023-11-20 RX ADMIN — ESCITALOPRAM OXALATE 5 MG: 5 TABLET ORAL at 08:29

## 2023-11-20 RX ADMIN — DONEPEZIL HYDROCHLORIDE 5 MG: 5 TABLET, FILM COATED ORAL at 21:24

## 2023-11-20 RX ADMIN — ASPIRIN 81 MG: 81 TABLET, CHEWABLE ORAL at 08:30

## 2023-11-20 RX ADMIN — CEFTRIAXONE SODIUM 2000 MG: 2 INJECTION, POWDER, FOR SOLUTION INTRAMUSCULAR; INTRAVENOUS at 17:48

## 2023-11-20 ASSESSMENT — PAIN SCALES - GENERAL
PAINLEVEL_OUTOF10: 0
PAINLEVEL_OUTOF10: 3
PAINLEVEL_OUTOF10: 0

## 2023-11-20 ASSESSMENT — PAIN DESCRIPTION - FREQUENCY: FREQUENCY: INTERMITTENT

## 2023-11-20 ASSESSMENT — PAIN DESCRIPTION - LOCATION: LOCATION: ARM;LEG

## 2023-11-20 ASSESSMENT — PAIN DESCRIPTION - PAIN TYPE: TYPE: ACUTE PAIN

## 2023-11-20 ASSESSMENT — PAIN DESCRIPTION - ORIENTATION: ORIENTATION: LEFT

## 2023-11-20 ASSESSMENT — PAIN DESCRIPTION - DESCRIPTORS: DESCRIPTORS: ACHING;DISCOMFORT

## 2023-11-20 ASSESSMENT — PAIN - FUNCTIONAL ASSESSMENT: PAIN_FUNCTIONAL_ASSESSMENT: ACTIVITIES ARE NOT PREVENTED

## 2023-11-20 ASSESSMENT — PAIN DESCRIPTION - DIRECTION: RADIATING_TOWARDS: DENIES

## 2023-11-20 ASSESSMENT — PAIN DESCRIPTION - ONSET: ONSET: ON-GOING

## 2023-11-20 ASSESSMENT — PAIN SCALES - WONG BAKER: WONGBAKER_NUMERICALRESPONSE: 0

## 2023-11-20 NOTE — PROGRESS NOTES
Pacemaker interrogated by [employer] HERMAN. Maria Del Rosario Flores notified. Pacemaker settings adjusted. Specifics are in copy on hard chart.

## 2023-11-20 NOTE — PROGRESS NOTES
HCT 29.0 (L) 11/19/2023    MCV 84.7 11/19/2023     (H) 11/19/2023           Reason for Consult     Reason for consult: Hyponatremia. Chief complaint:   Chief Complaint   Patient presents with    Extremity Weakness     Pt family member reports rapid decline with her ability to do her ADL's for 2 weeks now. Pt is unable to get herself up off of the toilet        History of Present Illness   Kelly Thomas is a 80 y.o. with PMH significant for hypothyroidism, SS S/P PM, CVA, HTN was admitted with general decline overall and also AMS. I saw patient with her son at bedside and per him there is gradual decline overall including mental status but last couple of weeks it was little rapid. Patient is awake and alert but little slow and able to answer basic questions. Denied any SOB, pain. Patient reported some vomiting but its not ongoing. No diarrhea. No urinary symptoms. No reported seizures. Appetite is not great. Review of Systems     Positive in bold or unable to assess     GEN: Fever, chills, night sweats. HEENT: Changes in vision, sore throat, rhinorrhea   CVS: Chest pain, palpitations, swelling or edema in legs  Pulmonary: Cough, hemoptysis, SOB  GI: Nausea, vomiting, diarrhea, constipation, abdominal pain  : Bladder incontinence, dysuria, hematuria. MSK: Muscle or joint or bone pains  Skin: Rashes, ulcers, skin thickness  CNS: Headache, dizziness, confusion, focal weakness, seizure. Psych: Anxiety, agitation, depression. Reviewed all 12 systems, negative except as above.      Past Medical History     Past Medical History:   Diagnosis Date    Arthritis     CAD (coronary artery disease)     CTS (carpal tunnel syndrome)     DVT (deep venous thrombosis) ([address]) 01/01/1966    post partum    Hyperlipidemia     Hypertension     Hypothyroidism     Lumbar disc disease     Nonrheumatic aortic valve insufficiency     OAB (overactive bladder)     Pacemaker     Paroxysmal atrial fibrillation ([address]) Psoriasis     SSS (sick sinus syndrome) (HCC)     Venous stasis          Past Surgical History     Past Surgical History:   Procedure Laterality Date    APPENDECTOMY      BREAST SURGERY      lumpectomy - benign    CARPAL TUNNEL RELEASE Right 2014    COLONOSCOPY  2004    Sigmoid diverticulosis    COLONOSCOPY      FINGER TRIGGER RELEASE  04/10/2012    left middle finger, and left CTR    HERNIA REPAIR  1980    rt inguinal    JOINT REPLACEMENT  2011    THR right    JOINT REPLACEMENT      TKR left    PACEMAKER INSERTION  2011    [place] [employer] pacer. Generator is safe. Leads are NOT SAFE. THYROIDECTOMY, PARTIAL           Family History     Family History   Problem Relation Age of Onset    Stroke Mother         cause of  death    Heart Disease Father         MI x 2 cause of death    Heart Disease Brother     Cancer Brother          Social History     Social History     Tobacco Use    Smoking status: Former     Packs/day: 0.25     Years: 6.00     Additional pack years: 0.00     Total pack years: 1.50     Types: Cigarettes     Quit date: 1964     Years since quittin.5    Smokeless tobacco: Never   Substance Use Topics    Alcohol use: Yes     Comment: 1 x month          Past medical, family, and social histories were reviewed as previously documented. Updates were made as necessary.       Inpatient Medications and Allergies       Scheduled Meds:   sodium chloride  1 g Oral TID WC    levothyroxine  50 mcg Oral Daily    urea  30 g Oral BID    cefTRIAXone (ROCEPHIN) IV  2,000 mg IntraVENous Q24H    apixaban  2.5 mg Oral BID    atorvastatin  40 mg Oral Nightly    donepezil  5 mg Oral Nightly    sodium chloride flush  5-40 mL IntraVENous 2 times per day    aspirin  81 mg Oral Daily    Or    aspirin  300 mg Rectal Daily    escitalopram  5 mg Oral Daily    metoprolol succinate  12.5 mg Oral Daily         Allergies: No Known Allergies      Vital Signs and Input Output     Vitals:    23

## 2023-11-20 NOTE — PROGRESS NOTES
Consulted for \"open areas on buttocks\". Per charting pt is incontinent of stool. Wound care orders placed. Wound Care to sign off. Please re-consult for changes or deterioration.

## 2023-11-20 NOTE — CONSULTS
Reason for Consultation/Chief Complaint: Altered mental status. History of Present Illness:  Lauri Santana is a 80 y.o. patient whom we were asked to see for SSS/PPM.  Admitted with altered mental tatus. Noted fatigue. Felt to be due to hyponatremia and dehydration,  Unable to perform her ADL. Has been over past 2 weeks. Has hx of afib,  SSS and pacemaker placement. Per family she was placed in assisted living and became progressively weak. Weakness in arms and legs. Ultimately was unable to get off toilet without 2 aids. Failed to keep up on po intake. Past Medical History:   has a past medical history of Arthritis, CAD (coronary artery disease), CTS (carpal tunnel syndrome), DVT (deep venous thrombosis) ([address]), Hyperlipidemia, Hypertension, Hypothyroidism, Lumbar disc disease, Nonrheumatic aortic valve insufficiency, OAB (overactive bladder), Pacemaker, Paroxysmal atrial fibrillation ([address]), Psoriasis, SSS (sick sinus syndrome) ([address]), and Venous stasis. Surgical History:   has a past surgical history that includes Appendectomy; Thyroidectomy, partial; Breast surgery; Pacemaker insertion (01/01/2011); Finger trigger release (04/10/2012); joint replacement (01/01/2011); joint replacement; Carpal tunnel release (Right, 09/09/2014); Colonoscopy (05/04/2004); hernia repair (01/01/1980); and Colonoscopy. Social History:   reports that she quit smoking about 59 years ago. She has a 1.50 pack-year smoking history. She has never used smokeless tobacco. She reports current alcohol use. She reports that she does not use drugs. Family History:  No evidence for sudden cardiac death or premature CAD    Home Medications:  Were reviewed and are listed in nursing record. and/or listed below  Prior to Admission medications    Medication Sig Start Date End Date Taking?  Authorizing Provider   donepezil (ARICEPT) 5 MG tablet Take 1 tablet by mouth nightly   Yes Provider, MD Ino   folic acid MD Ino   metoprolol succinate (TOPROL XL) 25 MG extended release tablet Take 0.5 tablets by mouth daily 8/28/23   Jaki Fernandez MD   Cholecalciferol (VITAMIN D PO) Take 2,000 Units by mouth daily    Ino Temple MD   levothyroxine (SYNTHROID) 50 MCG tablet Take 1 tablet by mouth daily    ProviderIno MD        Allergies:  Patient has no known allergies. Review of Systems:   All 12 point review of symptoms completed. Pertinent positives identified in the HPI, all other review of symptoms negative as below. Constitutional: there has been no unanticipated weight loss. There's been no change in energy level, sleep pattern, or activity level. Eyes: No visual changes or diplopia. No scleral icterus. ENT: No Headaches, hearing loss or vertigo. No mouth sores or sore throat. Cardiovascular: No loss of consciousness. No hemoptysis, pleuritic pain, or phlebitis. Respiratory: No cough or wheezing, no sputum production. No hematemesis. Gastrointestinal: No abdominal pain, appetite loss, blood in stools. No change in bowel or bladder habits. Genitourinary: No dysuria, trouble voiding, or hematuria. Musculoskeletal:  No gait disturbance, weakness or joint complaints. Integumentary: No rash or pruritis. Neurological: No headache, diplopia, change in muscle strength, numbness or tingling. No change in gait, balance, coordination, mood, affect, memory, mentation, behavior. Psychiatric: No anxiety, loss of interest, change in sexual behavior, feelings of self-harm, or confusion. Endocrine: No malaise, fatigue or temperature intolerance. No excessive thirst, fluid intake, or urination. No tremor. Hematologic/Lymphatic: No abnormal bruising or bleeding, blood clots or swollen lymph nodes. Allergic/Immunologic: No nasal congestion or hives.     Physical Examination:    Vitals:    11/20/23 1017   BP:    Pulse: 60   Resp:    Temp:    SpO2:     Weight - Scale: 65.4 kg (144 lb 2.9 oz)

## 2023-11-20 NOTE — PROGRESS NOTES
Physical Therapy    Daily Treatment Note      Discharge Recommendations:  Subacute/Skilled Nursing Facility  AM-PAC Score:  11  Equipment Needs: Defer    Assessment:  Pt needing less assist today, progressing ambulation to short distances in room. Pt remains unsteady in standing and is at risk for falls. Pt would benefit from continued skilled PT to increase strength and maximize mobility. Chart Reviewed: Yes     Other position/activity restrictions: up as tolerated   Additional Pertinent Hx: 80 y.o. F admitted 11/17 from care facility with AMS, decreased mobility, decreased PO and BM. + elevated LFTs, hyponatremia, leukocytosis, shoulder swelling (XR unremarkable for acute). Treatment Diagnosis: mobility impairment due to AMS      Subjective:  Pt found supine in bed. Pt agreeable for PT. Noted anxiety. \"I'm just so weak. \"    Pain:  Denies pain. Objective:    Bed mobility  Supine to sit:  Mod A (HOB raised, log roll, verbal cues to reach for rail)    Transfers  Sit to stand:  Min A + Mod A (bed and chair to walker, progressing to Mod A x1)  Stand to sit: Max A (poor control to sit, unable to reach back)  Bed <> chair:  Min A + Mod A (stand step pivot to chair)    Ambulation  Assistance Level: Mod A   Assistive device:  Rolling walker  Distance:  5ft + 10ft + 10ft  Quality of gait:  B knees flexed, shuffled gait, unsteady  Other:  Chair follow. Seated rest breaks between bouts of ambulation. Neuro/balance  Sitting balance:  CGA at EOB  Static standing:  Min A at walker, progressing to Max A with posterior loss of balance  Dynamic standing: Mod A with walker for ambulation    Patient Education  Role of PT. Pt verbalized understanding, but needs ongoing education due to confusion. Safety Devices  Pt left with needs in reach, seated in chair with LEs elevated, alarm in place and RN aware. Family in room.            AM-PAC score  AM-PAC Inpatient Mobility Raw Score : 11  AM-PAC Inpatient T-Scale Score : 33.86  Mobility Inpatient CMS 0-100% Score: 72.57  Mobility Inpatient CMS G-Code Modifier : CL    Goals:  Goals not met this treatment, continue with current goals. Time Frame for Short Term Goals: discharge  Short Term Goal 1: sit to/from supine supervision   Short Term Goal 2: sit to/from stand supervision   Short Term Goal 3: ambulate 40 ft with RW supervision          Plan:  2-5x/week  Current Treatment Recommendations: Functional mobility training, Transfer training, Gait training, Balance training    Therapy Time    Individual  Concurrent  Group  Co-treatment    Time In  1320            Time Out  1359            Minutes  39              Timed Code Treatment Minutes:  39  Total Treatment Minutes:  39      If patient is discharged prior to next treatment, this note will serve as the discharge summary.     Yas Rodriguez, PT

## 2023-11-20 NOTE — PROGRESS NOTES
Occupational Therapy  Facility/Department: [address]  Occupational Therapy Treatment     Name: Laverne Almanza  : 1932  MRN: [id number]  Date of Service: 2023    Discharge Recommendations: Laverne Almanza scored a 12/24 on the AM-PAC ADL Inpatient form. Current research shows that an AM-PAC score of 17 or less is typically not associated with a discharge to the patient's home setting. Please see assessment section for further patient specific details. If patient discharges prior to next session this note will serve as a discharge summary. Please see below for the latest assessment towards goals. Subacute/Skilled Nursing Facility          Patient Diagnosis(es): The encounter diagnosis was Hyponatremia. Past Medical History:  has a past medical history of Arthritis, CAD (coronary artery disease), CTS (carpal tunnel syndrome), DVT (deep venous thrombosis) ([address]), Hyperlipidemia, Hypertension, Hypothyroidism, Lumbar disc disease, Nonrheumatic aortic valve insufficiency, OAB (overactive bladder), Pacemaker, Paroxysmal atrial fibrillation ([address]), Psoriasis, SSS (sick sinus syndrome) ([address]), and Venous stasis. Past Surgical History:  has a past surgical history that includes Appendectomy; Thyroidectomy, partial; Breast surgery; Pacemaker insertion (2011); Finger trigger release (04/10/2012); joint replacement (2011); joint replacement; Carpal tunnel release (Right, 2014); Colonoscopy (2004); hernia repair (1980); and Colonoscopy. Treatment Diagnosis: Decreased activity tolerance, impaired ADLs and mobility      Assessment   Performance deficits / Impairments: Decreased functional mobility ; Decreased ADL status; Decreased strength;Decreased cognition;Decreased endurance;Decreased balance  Assessment: Pt requiring assist with all mobility 2/2 weakness and fatigue, requiring encouragement. Pt completing mobility with Ax1-2 for transfers and ambulation.  Improved

## 2023-11-20 NOTE — PROGRESS NOTES
V2.0  [facility] Hospitalist Progress Note      Name:  Kitty Chi /Age/Sex: 1932  (80 y.o. female)   MRN & CSN:  [id number] & [id number] Encounter Date/Time: 2023 1:59 PM EST    Location:  [facility]- PCP: Romero Bearden MD       Hospital Day: 4    Assessment and Plan:   Kitty Chi is a 80 y.o. female with pmh of hypothyroidism, sick sinus syndrome s/p pacemaker, who presents with AMS (altered mental status)      Plan:    Acute metabolic encephalopathy  Dementia  -Most likely due to hyponatremia and dehydration  -Seems to be improving back to her baseline     Bradycardia/tachycardia   Hx of SSS S/P PPM  -Cardiology consulted and will interrogate pacemaker     Hyponatremia  -Sodium 123 mmol/liter on presentation.  -Clinically volume depleted. Urine sodium less than 20, urine osmolality 635.  -Likely due to poor eating and drinking  -Nephrology on board. Follow recommendations. Started urea and salt tablets  -Aurelio Haugan was recently started. Will stop it in case it was contributing to the low Na    Streptococcus bacteremia  -Follow final cultures  -Unclear source  -Continue ceftriaxone 2 g. Plan for 2 weeks of treatment. Will switch to oral amoxicillin on discharge   -Repeat blood cultures    Left upper extremity swelling- negative doppler     History of depression   History of A-fib, on Eliquis and metoprolol, continue  History of hyperlipidemia        Diet ADULT DIET; Regular; Low Fat/Low Chol/High Fiber/2 gm Na;  No Added Salt (3-4 gm); 1200 ml   DVT Prophylaxis [] Lovenox, []  Heparin, [] SCDs, [] Ambulation,  [] Eliquis, [] Xarelto  [] Coumadin   Code Status Full Code   Disposition From:   Expected Disposition:   Estimated Date of Discharge:   Patient requires continued admission due to    Surrogate Decision Maker/ POA      Personally reviewed Lab Studies and Imaging     Discussed management of the case with nephrology     EKG interpreted personally and results atrial paced

## 2023-11-20 NOTE — PLAN OF CARE
Problem: Safety - Adult  Goal: Free from fall injury  11/20/2023 0737 by Beck Smith RN  Outcome: Progressing  Flowsheets (Taken 11/20/2023 1917)  Free From Fall Injury:   Leesa Salcedomiguel angel family/caregiver on patient safety   Based on caregiver fall risk screen, instruct family/caregiver to ask for assistance with transferring infant if caregiver noted to have fall risk factors  11/19/2023 2240 by Ashley Rodríguez RN  Outcome: Progressing  Note: High risk for fall. Call light within reach. Bed locked, adjusted to it's lowest height and alarm-on. Assisted all her needs, and keep visited.      Problem: ABCDS Injury Assessment  Goal: Absence of physical injury  Outcome: Progressing  Flowsheets (Taken 11/20/2023 0737)  Absence of Physical Injury: Implement safety measures based on patient assessment

## 2023-11-20 NOTE — PLAN OF CARE
Problem: Safety - Adult  Goal: Free from fall injury  11/19/2023 2240 by Tiago Burgess RN  Outcome: Progressing  Note: High risk for fall. Call light within reach. Bed locked, adjusted to it's lowest height and alarm-on. Assisted all her needs, and keep visited. Problem: Skin/Tissue Integrity  Goal: Absence of new skin breakdown  Description: 1. Monitor for areas of redness and/or skin breakdown  2. Assess vascular access sites hourly  3. Every 4-6 hours minimum:  Change oxygen saturation probe site  4. Every 4-6 hours:  If on nasal continuous positive airway pressure, respiratory therapy assess nares and determine need for appliance change or resting period. Outcome: Progressing  Note: With incontinence, at risk to develop new skin breakdown. Keep skin clean and dry, diaper change PRN and keep purewick attached to suction. Assisted in repositioning at times.      Problem: Discharge Planning  Goal: Discharge to home or other facility with appropriate resources  Outcome: Progressing

## 2023-11-21 LAB
ALBUMIN SERPL-MCNC: 2.3 G/DL (ref 3.4–5)
ALBUMIN SERPL-MCNC: 2.4 G/DL (ref 3.4–5)
ALBUMIN/GLOB SERPL: 0.7 {RATIO} (ref 1.1–2.2)
ALP SERPL-CCNC: 177 U/L (ref 40–129)
ALT SERPL-CCNC: 44 U/L (ref 10–40)
ANION GAP SERPL CALCULATED.3IONS-SCNC: 10 MMOL/L (ref 3–16)
ANION GAP SERPL CALCULATED.3IONS-SCNC: 8 MMOL/L (ref 3–16)
AST SERPL-CCNC: 43 U/L (ref 15–37)
BILIRUB SERPL-MCNC: 0.8 MG/DL (ref 0–1)
BUN SERPL-MCNC: 41 MG/DL (ref 7–20)
BUN SERPL-MCNC: 42 MG/DL (ref 7–20)
CALCIUM SERPL-MCNC: 8.9 MG/DL (ref 8.3–10.6)
CALCIUM SERPL-MCNC: 8.9 MG/DL (ref 8.3–10.6)
CHLORIDE SERPL-SCNC: 92 MMOL/L (ref 99–110)
CHLORIDE SERPL-SCNC: 93 MMOL/L (ref 99–110)
CO2 SERPL-SCNC: 22 MMOL/L (ref 21–32)
CO2 SERPL-SCNC: 23 MMOL/L (ref 21–32)
CREAT SERPL-MCNC: 0.6 MG/DL (ref 0.6–1.2)
CREAT SERPL-MCNC: 0.6 MG/DL (ref 0.6–1.2)
DEPRECATED RDW RBC AUTO: 17.6 % (ref 12.4–15.4)
GFR SERPLBLD CREATININE-BSD FMLA CKD-EPI: >60 ML/MIN/{1.73_M2}
GFR SERPLBLD CREATININE-BSD FMLA CKD-EPI: >60 ML/MIN/{1.73_M2}
GLUCOSE SERPL-MCNC: 89 MG/DL (ref 70–99)
GLUCOSE SERPL-MCNC: 90 MG/DL (ref 70–99)
HCT VFR BLD AUTO: 25.7 % (ref 36–48)
HGB BLD-MCNC: 8.6 G/DL (ref 12–16)
MCH RBC QN AUTO: 28.6 PG (ref 26–34)
MCHC RBC AUTO-ENTMCNC: 33.6 G/DL (ref 31–36)
MCV RBC AUTO: 85.3 FL (ref 80–100)
PHOSPHATE SERPL-MCNC: 3 MG/DL (ref 2.5–4.9)
PLATELET # BLD AUTO: 494 K/UL (ref 135–450)
PMV BLD AUTO: 7.6 FL (ref 5–10.5)
POTASSIUM SERPL-SCNC: 4.3 MMOL/L (ref 3.5–5.1)
POTASSIUM SERPL-SCNC: 4.3 MMOL/L (ref 3.5–5.1)
PROT SERPL-MCNC: 6 G/DL (ref 6.4–8.2)
RBC # BLD AUTO: 3.02 M/UL (ref 4–5.2)
SODIUM SERPL-SCNC: 124 MMOL/L (ref 136–145)
SODIUM SERPL-SCNC: 124 MMOL/L (ref 136–145)
SODIUM SERPL-SCNC: 128 MMOL/L (ref 136–145)
WBC # BLD AUTO: 7.9 K/UL (ref 4–11)

## 2023-11-21 PROCEDURE — 2580000003 HC RX 258: Performed by: INTERNAL MEDICINE

## 2023-11-21 PROCEDURE — 6370000000 HC RX 637 (ALT 250 FOR IP): Performed by: INTERNAL MEDICINE

## 2023-11-21 PROCEDURE — 2580000003 HC RX 258: Performed by: FAMILY MEDICINE

## 2023-11-21 PROCEDURE — 85027 COMPLETE CBC AUTOMATED: CPT

## 2023-11-21 PROCEDURE — 6370000000 HC RX 637 (ALT 250 FOR IP): Performed by: FAMILY MEDICINE

## 2023-11-21 PROCEDURE — 80053 COMPREHEN METABOLIC PANEL: CPT

## 2023-11-21 PROCEDURE — 1200000000 HC SEMI PRIVATE

## 2023-11-21 PROCEDURE — 6370000000 HC RX 637 (ALT 250 FOR IP): Performed by: STUDENT IN AN ORGANIZED HEALTH CARE EDUCATION/TRAINING PROGRAM

## 2023-11-21 PROCEDURE — 84295 ASSAY OF SERUM SODIUM: CPT

## 2023-11-21 PROCEDURE — 97535 SELF CARE MNGMENT TRAINING: CPT

## 2023-11-21 PROCEDURE — 99233 SBSQ HOSP IP/OBS HIGH 50: CPT | Performed by: INTERNAL MEDICINE

## 2023-11-21 PROCEDURE — 36415 COLL VENOUS BLD VENIPUNCTURE: CPT

## 2023-11-21 PROCEDURE — 6360000002 HC RX W HCPCS: Performed by: INTERNAL MEDICINE

## 2023-11-21 PROCEDURE — 97530 THERAPEUTIC ACTIVITIES: CPT

## 2023-11-21 RX ORDER — TOLVAPTAN 15 MG/1
15 TABLET ORAL ONCE
Status: COMPLETED | OUTPATIENT
Start: 2023-11-21 | End: 2023-11-21

## 2023-11-21 RX ADMIN — SODIUM CHLORIDE: 9 INJECTION, SOLUTION INTRAVENOUS at 18:25

## 2023-11-21 RX ADMIN — ATORVASTATIN CALCIUM 40 MG: 40 TABLET, FILM COATED ORAL at 20:35

## 2023-11-21 RX ADMIN — Medication 30 G: at 09:06

## 2023-11-21 RX ADMIN — DONEPEZIL HYDROCHLORIDE 5 MG: 5 TABLET, FILM COATED ORAL at 20:35

## 2023-11-21 RX ADMIN — APIXABAN 2.5 MG: 2.5 TABLET, FILM COATED ORAL at 20:35

## 2023-11-21 RX ADMIN — ASPIRIN 81 MG: 81 TABLET, CHEWABLE ORAL at 09:07

## 2023-11-21 RX ADMIN — SODIUM CHLORIDE, PRESERVATIVE FREE 10 ML: 5 INJECTION INTRAVENOUS at 20:40

## 2023-11-21 RX ADMIN — SODIUM CHLORIDE 1 G: 1 TABLET ORAL at 09:06

## 2023-11-21 RX ADMIN — SODIUM CHLORIDE, PRESERVATIVE FREE 10 ML: 5 INJECTION INTRAVENOUS at 09:07

## 2023-11-21 RX ADMIN — LEVOTHYROXINE SODIUM 50 MCG: 50 TABLET ORAL at 09:07

## 2023-11-21 RX ADMIN — TOLVAPTAN 15 MG: 15 TABLET ORAL at 12:33

## 2023-11-21 RX ADMIN — CEFTRIAXONE SODIUM 2000 MG: 2 INJECTION, POWDER, FOR SOLUTION INTRAMUSCULAR; INTRAVENOUS at 18:26

## 2023-11-21 RX ADMIN — APIXABAN 2.5 MG: 2.5 TABLET, FILM COATED ORAL at 09:07

## 2023-11-21 ASSESSMENT — PAIN SCALES - GENERAL
PAINLEVEL_OUTOF10: 0

## 2023-11-21 NOTE — PROGRESS NOTES
Physical Therapy    Daily Treatment Note      Discharge Recommendations:    Subacute/Skilled Nursing Facility  AM-PAC Score: 11  Equipment Needs: No    Assessment:  Pt with self limiting behavior, needing max encouragement for out of bed activity. Pt voicing \"I'm too far back to get better. \"  Feel pt would benefit from Palliative Care consult for goals of care. Pt is below baseline and would benefit from continued PT at SNF if agreeable. Will follow. Chart Reviewed: Yes   Other position/activity restrictions: up as tolerated   Additional Pertinent Hx: 80 y.o. F admitted 11/17 from care facility with AMS, decreased mobility, decreased PO and BM. + elevated LFTs, hyponatremia, leukocytosis, shoulder swelling (XR unremarkable for acute). Treatment Diagnosis: mobility impairment due to AMS      Subjective:  Pt found supine in bed. Pt needing increased encouragement for activity. \"I can't. I'm just too weak. My back hurts. I'm tired. I'm done. I need to sleep. I don't have the perseverance that Loni Hernandez does. \"     Pain:  Back pain, not rated, RN notified. Objective:    Bed mobility  Supine to sit:  Min A (HOB raised, using rail)    Transfers  Sit to stand:  Min A x2 (from EOB to walker, verbal cues for hand placement)  Stand to sit:  Min A x2 (decreased control to sit)  Bed <> chair:  Min A x2 (stand step pivot with use of rolling walker)    Ambulation  Assistance Level:  Min x2 assist  Assistive device:  Rolling walker  Distance:  3ft   Quality of gait:  Flexed posture, narrow MARY, shuffled steps, unsteady gait, B knee flexion    Neuro/balance  Sitting balance:  SBA (at EOB)  Static stance:  Min A (at walker, flexed posture, slight posterior lean)  Dynamic stance:  Min A x2 (with rolling walker for transfers)      Patient Education  Role of PT. Pt verbalized partial understanding and would benefit from reinforcement.     Safety Devices  Pt left with needs in reach, seated in chair with LEs

## 2023-11-21 NOTE — CARE COORDINATION
CM  following for  d/c planning:    Patient from half-way    Per  Sourav Mcguire in admissions [phone] at  [facility] they are  able to accept if  pt is agreeable. No pre cert  needed. CM  will cont to follow ,  pt  still with Hyponatremia  Na+  128    Electronically signed by Ayesha Henriquez RN on 11/21/2023 at 4:15 PM       Ayesha Henriquez  RN Case Manager  [facility].  [address] [place] [address]. [address] [place] [address]  [phone]  Fax [phone]

## 2023-11-21 NOTE — PROGRESS NOTES
Patient having runs of vtach occasionally. Patient sleeping during runs. Patient sleeping peacefully with no signs or symptoms of respiratory or cardiac distress. No needs at this time. Patient resting comfortably in bed. Call light in reach. Bed alarm on. Will continue to monitor.    Electronically signed by Jazmine Gregory RN on 11/21/2023 at 5:44 AM

## 2023-11-21 NOTE — PROGRESS NOTES
Patient is alert to self and place at times. Patient forgetful to time and situation. No complaints of pain or SOB. Vitals stable as charted. Respirations appear to be easy and unlabored. Lungs clear. Respirations easy with no complaints of cough. Cardiac monitor in place, current of a/v paced. Left FA PIV intact and flushed. No complaints of nausea/vomiting/diarrhea. Up with assist/steady to the bathroom or chair as needed. Incontinent of urine and stool. Tolerating regular diet. Plan of care and safety measures reviewed with the patient. Call light in reach and bed alarm in place. Bed hooked to wall for bed alarm purposes. Will continue to monitor.   Electronically signed by Mychal Ochoa RN on 11/20/2023 at 10:35 PM

## 2023-11-21 NOTE — PROGRESS NOTES
Occupational Therapy  Facility/Department: [address]  Occupational Therapy Treatment    Name: Kitty Chi  : 1932  MRN: [id number]  Date of Service: 2023    Discharge Recommendations:Caryl Lam scored a  on the AM-PAC ADL Inpatient form. Current research shows that an AM-PAC score of 17 or less is typically not associated with a discharge to the patient's home setting. Please see assessment section for further patient specific details. If patient discharges prior to next session this note will serve as a discharge summary. Please see below for the latest assessment towards goals. Subacute/Skilled Nursing Facility          Patient Diagnosis(es): The encounter diagnosis was Hyponatremia. Past Medical History:  has a past medical history of Arthritis, CAD (coronary artery disease), CTS (carpal tunnel syndrome), DVT (deep venous thrombosis) ([address]), Hyperlipidemia, Hypertension, Hypothyroidism, Lumbar disc disease, Nonrheumatic aortic valve insufficiency, OAB (overactive bladder), Pacemaker, Paroxysmal atrial fibrillation ([address]), Psoriasis, SSS (sick sinus syndrome) ([address]), and Venous stasis. Past Surgical History:  has a past surgical history that includes Appendectomy; Thyroidectomy, partial; Breast surgery; Pacemaker insertion (2011); Finger trigger release (04/10/2012); joint replacement (2011); joint replacement; Carpal tunnel release (Right, 2014); Colonoscopy (2004); hernia repair (1980); and Colonoscopy. Treatment Diagnosis: Decreased activity tolerance, impaired ADLs and mobility      Assessment   Performance deficits / Impairments: Decreased functional mobility ; Decreased ADL status; Decreased strength;Decreased cognition;Decreased endurance;Decreased balance  Assessment: Pt requiring assist with all mobility 2/2 weakness and fatigue, requiring encouragement. Pt completing mobility with Ax2 for transfers and ambulation bed to chair.

## 2023-11-21 NOTE — PROGRESS NOTES
V2.0  [facility] Hospitalist Progress Note      Name:  Rom Ken /Age/Sex: 1932  (80 y.o. female)   MRN & CSN:  [id number] & [id number] Encounter Date/Time: 2023 1:59 PM EST    Location:  [address] [place] [address] PCP: Марина Rick MD       Hospital Day: 5    Assessment and Plan:   Rom Ken is a 80 y.o. female with pmh of hypothyroidism, sick sinus syndrome s/p pacemaker, who presents with AMS (altered mental status)      Plan:    Acute metabolic encephalopathy  Dementia  -Most likely due to hyponatremia and dehydration  -Improved mentation. Sodium remains low  -Nephrology recommendations noted  -Potential discharge when sodium level is greater than 130  -Potential discharge for tomorrow    Bradycardia/tachycardia   Hx of SSS S/P PPM  -Cardiology consult recommendations noted and appreciated  -PPM reprogrammed.  -Restarted on low-dose beta-blocker    Hyponatremia  On urea and salt tablets initially now held  Samsca x1  Lexapro on hold  Every 6 hours metabolic panel  Potential discharge tomorrow    Streptococcus bacteremia  -Follow final cultures  -Unclear source  -Continue ceftriaxone 2 g. Plan for 2 weeks of treatment. Will switch to oral amoxicillin on discharge   -Repeat blood cultures so far no growth  -Likely contaminant if no growth by tomorrow we will DC Rocephin    Left upper extremity swelling- negative doppler     History of depression   History of A-fib, on Eliquis and metoprolol, continue  History of hyperlipidemia        Diet ADULT DIET; Regular; Low Fat/Low Chol/High Fiber/2 gm Na;  No Added Salt (3-4 gm); 1200 ml   DVT Prophylaxis [x] Lovenox, []  Heparin, [] SCDs, [] Ambulation,  [] Eliquis, [] Xarelto  [] Coumadin   Code Status Full Code   Disposition From:   Expected Disposition:   Estimated Date of Discharge:   Patient requires continued admission due to    Surrogate Decision Maker/ POA      Personally reviewed Lab Studies and Imaging     Discussed management of the case with kidney: 9.2 cm in length. Normal parenchymal echogenicity. No hydronephrosis. Other: No free fluid. 1.  Gallbladder sludge, but no shadowing gallstones, and no sonographic evidence of acute cholecystitis. CT HEAD WO CONTRAST    Result Date: 11/17/2023  EXAM: CT HEAD WO CONTRAST INDICATION: Altered mental status TECHNIQUE: Axial thin section CT images of the head were obtained without contrast. Sagittal and coronal 2-D multiplanar reconstructions were performed at the scanner. Up-to-date CT equipment and radiation dose reduction techniques were employed. COMPARISON: Head CT dated 11/4/23 FINDINGS: The diagnostic quality of the examination is adequate. Brain parenchyma: Moderate nonspecific patchy and confluent white matter low-attenuation. 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 Ventricles and extraaxial spaces: Proportionate enlargement of ventricles and sulci consistent with mild atrophy. Orbits, paranasal sinuses, mastoids: Prior cataract surgery. Clear paranasal sinuses. Clear mastoid air cells. Extracranial soft tissues: Normal. Calvarium and skull base: No acute calvarial abnormality. Other: Atherosclerotic vascular calcifications. 1.  No evidence of acute intracranial hemorrhage or mass effect. 2.  Stable small left occipital infarct. 3.  Moderate chronic small vessel ischemic disease and mild diffuse atrophy.        Electronically signed by Alfonzo Chatterjee MD

## 2023-11-21 NOTE — CARE COORDINATION
Case Management Assessment  Initial Evaluation    Date/Time of Evaluation: 11/21/2023 4:08 PM  Assessment Completed by: Khari Gamez RN    If patient is discharged prior to next notation, then this note serves as note for discharge by case management. Patient Name: Pili Martinez                   YOB: 1932  Diagnosis: AMS (altered mental status) [R41.82]                   Date / Time: 11/17/2023 12:29 PM    Patient Admission Status: Inpatient   Readmission Risk (Low < 19, Mod (19-27), High > 27): Readmission Risk Score: 22.5    Current PCP: Festus Louis MD  PCP verified by CM? Yes    Chart Reviewed: Yes      History Provided by: Patient  Patient Orientation: Alert and Oriented, Person, Place    Patient Cognition: Short Term Memory Deficit    Hospitalization in the last 30 days (Readmission):  No    If yes, Readmission Assessment in  Navigator will be completed. Advance Directives:      Code Status: Full Code   Patient's Primary Decision Maker is: Legal Next of Kin      Discharge Planning:    Patient lives with: Alone Type of Home: Assisted living ([facility])  Primary Care Giver:  (MIMI Aaron  @ [facility])  Patient Support Systems include: Children, Family Members   Current Financial resources: Medicare  Current community resources: Assisted Living, ECF/Home Care  Current services prior to admission: Home Care            Current DME:              Type of Home Care services:  [employer], Nursing Services    ADLS  Prior functional level: Assistance with the following:  Current functional level: Assistance with the following:    PT AM-PAC: 11 /24  OT AM-PAC: 12 /24    Family can provide assistance at DC: Yes  Would you like Case Management to discuss the discharge plan with any other family members/significant others, and if so, who?  Yes  Plans to Return to Present Housing: Unknown at present  Other Identified Issues/Barriers to RETURNING to current housing: safety  Potential Assistance needed at discharge: N/A            Potential DME:    Patient expects to discharge to: Assisted living  Plan for transportation at discharge: Family    Financial    Payor: Caryle Curia / Plan: MEDICARE PART A AND B / Product Type: *No Product type* /     Does insurance require precert for SNF: Yes    Potential assistance Purchasing Medications: No  Meds-to-Beds request: Not Assessed      [address] [facility] [address] [facility], [address] [place] [address]  [address] [facility]  [address] [facility] [address]  Phone: [phone] Fax: [phone]      Notes:    Factors facilitating achievement of predicted outcomes: Family support, Motivated, Cooperative, and Pleasant    Barriers to discharge: Confusion, Cognitive deficit, and Lower extremity weakness    Additional Case Management Notes:     CM  following for  d/c planning;    Patient  from  [facility] ,  Cm  spoke with  [facility]   in admissions to  confirm  LOC :  Assisted Living : She  will follow in the event  pt  needs  SNF at  d/c .   -  She  will be  able to accept  ,  Pending  PTOT evals , no pre cert needed. Patient  poor historian  will follow up with  family  , patient states  ask  her  son . She  reports  she has  lots  of  support. The Plan for Transition of Care is related to the following treatment goals of AMS (altered mental status) [T42.12]    IF APPLICABLE: The Patient and/or patient representative Fred Miguel and her family were provided with a choice of provider and agrees with the discharge plan. Freedom of choice list with basic dialogue that supports the patient's individualized plan of care/goals and shares the quality data associated with the providers was provided to: Patient   Patient Representative Name:       The Patient and/or Patient Representative Agree with the Discharge Plan?  Yes    Maksim Salazar RN  Case Management Department  Ph: [phone]

## 2023-11-21 NOTE — PROGRESS NOTES
Reason for admission:                 Altered mental status     Brief Summary :     Cesar Alexander is being seen by nephrology for hyponatremia. Interval History and plan:     Patient seen at bedside   BP is stable   Urine output not completely noted   Sodium is 120 > 124   Stable creatinine          Hold salt tablets and urea today   Samsca X 1  Monitor sodium q 6   Will like sodium to be 130-132 in 24 hrs   Lexapro held      Assessment :     Hyponatremia. On Lexapro  Not on Diuretics  Patient has hypothyroidism. Urine Na < 20, urine osm was 635  Appeared to be due to mild IV volume depletion and decreased solute intake but Na did not improve with IV normal saline, so stopped it. Also patient is recently started on Lexapro which can also contribute   TSH was 8.7 with normal free T4          Acid/Base status:  Stable      Volume status/BP:  BP mild elevated     Does not appear volume overloaded. May be mild IV volume depleted side. Please call with questions at-  24 hrs Answering service [phone]   7 am-5 pm at [employer], or cell phone  Dr Gregorio Ramirez MD      HPI:     Cesar Alexander is a 80 y.o. with PMH significant for hypothyroidism, SS S/P PM, CVA, HTN was admitted with general decline overall and also AMS. I saw patient with her son at bedside and per him there is gradual decline overall including mental status but last couple of weeks it was little rapid. Patient is awake and alert but little slow and able to answer basic questions. Denied any SOB, pain. Patient reported some vomiting but its not ongoing. No diarrhea. No urinary symptoms. No reported seizures. Appetite is not great. Vitals:     Vitals:    11/21/23 0912   BP: 101/66   Pulse: 60   Resp:    Temp:    SpO2:          Physical Examination:     General appearance: NAD  Head: Normocephalic, without obvious abnormality, atraumatic   Mouth: Moist mucous membrane  Neck: Supple.    Lungs: Good air entry

## 2023-11-22 LAB
ALBUMIN SERPL-MCNC: 2.3 G/DL (ref 3.4–5)
ALBUMIN/GLOB SERPL: 0.5 {RATIO} (ref 1.1–2.2)
ALP SERPL-CCNC: 184 U/L (ref 40–129)
ALT SERPL-CCNC: 37 U/L (ref 10–40)
ANION GAP SERPL CALCULATED.3IONS-SCNC: 13 MMOL/L (ref 3–16)
AST SERPL-CCNC: 30 U/L (ref 15–37)
BILIRUB SERPL-MCNC: 0.7 MG/DL (ref 0–1)
BUN SERPL-MCNC: 21 MG/DL (ref 7–20)
CALCIUM SERPL-MCNC: 8.9 MG/DL (ref 8.3–10.6)
CHLORIDE SERPL-SCNC: 96 MMOL/L (ref 99–110)
CO2 SERPL-SCNC: 20 MMOL/L (ref 21–32)
CREAT SERPL-MCNC: 0.6 MG/DL (ref 0.6–1.2)
DEPRECATED RDW RBC AUTO: 18.1 % (ref 12.4–15.4)
GFR SERPLBLD CREATININE-BSD FMLA CKD-EPI: >60 ML/MIN/{1.73_M2}
GLUCOSE SERPL-MCNC: 107 MG/DL (ref 70–99)
HCT VFR BLD AUTO: 28 % (ref 36–48)
HGB BLD-MCNC: 9.1 G/DL (ref 12–16)
MCH RBC QN AUTO: 28.4 PG (ref 26–34)
MCHC RBC AUTO-ENTMCNC: 32.7 G/DL (ref 31–36)
MCV RBC AUTO: 87 FL (ref 80–100)
PLATELET # BLD AUTO: 533 K/UL (ref 135–450)
PMV BLD AUTO: 7.4 FL (ref 5–10.5)
POTASSIUM SERPL-SCNC: 4.1 MMOL/L (ref 3.5–5.1)
PROT SERPL-MCNC: 6.5 G/DL (ref 6.4–8.2)
RBC # BLD AUTO: 3.22 M/UL (ref 4–5.2)
SODIUM SERPL-SCNC: 129 MMOL/L (ref 136–145)
SODIUM SERPL-SCNC: 130 MMOL/L (ref 136–145)
WBC # BLD AUTO: 9.4 K/UL (ref 4–11)

## 2023-11-22 PROCEDURE — 36415 COLL VENOUS BLD VENIPUNCTURE: CPT

## 2023-11-22 PROCEDURE — 6360000002 HC RX W HCPCS: Performed by: INTERNAL MEDICINE

## 2023-11-22 PROCEDURE — 85027 COMPLETE CBC AUTOMATED: CPT

## 2023-11-22 PROCEDURE — 97530 THERAPEUTIC ACTIVITIES: CPT

## 2023-11-22 PROCEDURE — 97535 SELF CARE MNGMENT TRAINING: CPT

## 2023-11-22 PROCEDURE — 2580000003 HC RX 258: Performed by: INTERNAL MEDICINE

## 2023-11-22 PROCEDURE — 6370000000 HC RX 637 (ALT 250 FOR IP): Performed by: INTERNAL MEDICINE

## 2023-11-22 PROCEDURE — 99232 SBSQ HOSP IP/OBS MODERATE 35: CPT | Performed by: INTERNAL MEDICINE

## 2023-11-22 PROCEDURE — 6370000000 HC RX 637 (ALT 250 FOR IP): Performed by: FAMILY MEDICINE

## 2023-11-22 PROCEDURE — 80053 COMPREHEN METABOLIC PANEL: CPT

## 2023-11-22 PROCEDURE — 2580000003 HC RX 258: Performed by: FAMILY MEDICINE

## 2023-11-22 PROCEDURE — 84295 ASSAY OF SERUM SODIUM: CPT

## 2023-11-22 PROCEDURE — 1200000000 HC SEMI PRIVATE

## 2023-11-22 RX ADMIN — APIXABAN 2.5 MG: 2.5 TABLET, FILM COATED ORAL at 21:26

## 2023-11-22 RX ADMIN — ASPIRIN 81 MG: 81 TABLET, CHEWABLE ORAL at 09:19

## 2023-11-22 RX ADMIN — CEFTRIAXONE SODIUM 2000 MG: 2 INJECTION, POWDER, FOR SOLUTION INTRAMUSCULAR; INTRAVENOUS at 16:27

## 2023-11-22 RX ADMIN — LEVOTHYROXINE SODIUM 50 MCG: 50 TABLET ORAL at 09:19

## 2023-11-22 RX ADMIN — SODIUM CHLORIDE, PRESERVATIVE FREE 10 ML: 5 INJECTION INTRAVENOUS at 21:25

## 2023-11-22 RX ADMIN — ATORVASTATIN CALCIUM 40 MG: 40 TABLET, FILM COATED ORAL at 21:26

## 2023-11-22 RX ADMIN — METOPROLOL SUCCINATE 12.5 MG: 25 TABLET, EXTENDED RELEASE ORAL at 09:19

## 2023-11-22 RX ADMIN — DONEPEZIL HYDROCHLORIDE 5 MG: 5 TABLET, FILM COATED ORAL at 21:26

## 2023-11-22 RX ADMIN — SODIUM CHLORIDE, PRESERVATIVE FREE 10 ML: 5 INJECTION INTRAVENOUS at 09:19

## 2023-11-22 RX ADMIN — APIXABAN 2.5 MG: 2.5 TABLET, FILM COATED ORAL at 09:19

## 2023-11-22 ASSESSMENT — PAIN SCALES - GENERAL: PAINLEVEL_OUTOF10: 0

## 2023-11-22 NOTE — PROGRESS NOTES
Patient is alert to self and place at times. Patient forgetful to time and situation. No complaints of pain or SOB. RA, sat 95%. Vitals stable as charted. Respirations appear to be easy and unlabored. Lungs clear. Respirations easy with no complaints of cough. Cardiac monitor in place, current of a/v paced. Left FA PIV intact and flushed. No complaints of nausea/vomiting/diarrhea. Up with assist/steady to the bathroom or chair as needed. Incontinent of urine and stool. Tolerating regular diet. Plan of care and safety measures reviewed with the patient. Call light in reach and bed alarm in place. Bed hooked to wall for bed alarm purposes. Will continue to monitor.   Electronically signed by Wander Ray RN on 11/21/2023 at 9:22 PM

## 2023-11-22 NOTE — PROGRESS NOTES
V2.0  [facility] [place]      Name:  Pili Martinez /Age/Sex: 1932  (80 y.o. female)   MRN & CSN:  [id number] & [id number] Encounter Date/Time: 2023 1:59 PM EST    Location:  [address] [place] [address] PCP: Festus Louis MD       Hospital Day: 6    Assessment and Plan:   Pili Martinez is a 80 y.o. female with pmh of hypothyroidism, sick sinus syndrome s/p pacemaker, who presents with AMS (altered mental status)      Plan:    Acute metabolic encephalopathy  Dementia  -Most likely due to hyponatremia and dehydration  -Improved mentation. Sodium remains low  -Nephrology recommendations noted  -Sodium is creeping up up to 129. Patient is very sensitive to hyponatremia.  -Potential discharge for tomorrow if sodium is stable    Bradycardia/tachycardia   Hx of SSS S/P PPM  -Cardiology consult recommendations noted and appreciated  -PPM reprogrammed.  -Restarted on low-dose beta-blocker    Hyponatremia  On urea and salt tablets initially now held  Samsca x1  Lexapro on hold  Every 6 hours metabolic panel  Potential discharge tomorrow  Nephrology consult appreciated  Streptococcus bacteremia  Change to oral amoxicillin tomorrow    Left upper extremity swelling- negative doppler     History of depression   History of A-fib, on Eliquis and metoprolol, continue  History of hyperlipidemia        Diet ADULT DIET; Regular; Low Fat/Low Chol/High Fiber/2 gm Na;  No Added Salt (3-4 gm); 1200 ml   DVT Prophylaxis [x] Lovenox, []  Heparin, [] SCDs, [] Ambulation,  [] Eliquis, [] Xarelto  [] Coumadin   Code Status Full Code   Disposition From:   Expected Disposition:   Estimated Date of Discharge:   Patient requires continued admission due to    Surrogate Decision Maker/ POA      Personally reviewed Lab Studies and Imaging     Discussed management of the case with nephrology     EKG interpreted personally and results atrial paced rhythm    Imaging that was interpreted personally includes CT head without contrast LIMITED    Result Date: 11/17/2023  EXAM: US ABDOMEN LIMITED INDICATION: elevated lfts and alk phos COMPARISON: CT abdomen pelvis dated 9/15/2023 TECHNIQUE: Real-time grayscale and Doppler images of the abdomen were obtained, with attention directed to the right upper quadrant. FINDINGS: Liver: The liver is normal in echogenicity and homogeneous in echotexture. No focal liver lesion. Biliary/CBD: 2 mm. No intra or extrahepatic ductal dilatation. Gallbladder: No shadowing stones, wall thickening or pericholecystic fluid. . There is dependent echogenic sludge in the gallbladder. Sonographic Marguerita Alfred sign is negative. Pancreas: Visualized portions are unremarkable. Right kidney: 9.2 cm in length. Normal parenchymal echogenicity. No hydronephrosis. Other: No free fluid. 1.  Gallbladder sludge, but no shadowing gallstones, and no sonographic evidence of acute cholecystitis. CT HEAD WO CONTRAST    Result Date: 11/17/2023  EXAM: CT HEAD WO CONTRAST INDICATION: Altered mental status TECHNIQUE: Axial thin section CT images of the head were obtained without contrast. Sagittal and coronal 2-D multiplanar reconstructions were performed at the scanner. Up-to-date CT equipment and radiation dose reduction techniques were employed. COMPARISON: Head CT dated 11/4/23 FINDINGS: The diagnostic quality of the examination is adequate. Brain parenchyma: Moderate nonspecific patchy and confluent white matter low-attenuation. 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 Ventricles and extraaxial spaces: Proportionate enlargement of ventricles and sulci consistent with mild atrophy. Orbits, paranasal sinuses, mastoids: Prior cataract surgery. Clear paranasal sinuses. Clear mastoid air cells. Extracranial soft tissues: Normal. Calvarium and skull base: No acute calvarial abnormality. Other: Atherosclerotic vascular calcifications. 1.  No evidence of acute intracranial hemorrhage or mass effect.  2.  Stable

## 2023-11-22 NOTE — PROGRESS NOTES
Occupational Therapy  Facility/Department: [address]  Occupational Therapy Treatment     Name: Freddie Jordan  : 1932  MRN: [id number]  Date of Service: 2023    Discharge Recommendations:   Subacute/Skilled Nursing Facility  OT Equipment Recommendations  Other: defer to next level of care       Patient Diagnosis(es): The encounter diagnosis was Hyponatremia. Past Medical History:  has a past medical history of Arthritis, CAD (coronary artery disease), CTS (carpal tunnel syndrome), DVT (deep venous thrombosis) ([address]), Hyperlipidemia, Hypertension, Hypothyroidism, Lumbar disc disease, Nonrheumatic aortic valve insufficiency, OAB (overactive bladder), Pacemaker, Paroxysmal atrial fibrillation ([address]), Psoriasis, SSS (sick sinus syndrome) ([address]), and Venous stasis. Past Surgical History:  has a past surgical history that includes Appendectomy; Thyroidectomy, partial; Breast surgery; Pacemaker insertion (2011); Finger trigger release (04/10/2012); joint replacement (2011); joint replacement; Carpal tunnel release (Right, 2014); Colonoscopy (2004); hernia repair (1980); and Colonoscopy. Treatment Diagnosis: Decreased activity tolerance, impaired ADLs and mobility      Assessment   Performance deficits / Impairments: Decreased functional mobility ; Decreased ADL status; Decreased strength;Decreased cognition;Decreased endurance;Decreased balance  Assessment: Pt in better spirits this date, continues to require Ax2 with fx transfers and mobility fluctuating levels of assist. Min to [address] x2. Transfers this date bed-BSC-bed-chair. Pt requiring assist with brief change and pericare, ++ time toileting. Pt would benefit from cont therapies while in acute care and cont inpt at [place].   Treatment Diagnosis: Decreased activity tolerance, impaired ADLs and mobility  REQUIRES OT FOLLOW-UP: Yes  Activity Tolerance  Activity Tolerance: Patient Tolerated treatment well        Plan assistance;Assist X2;Minimum assistance  Sit to Stand: Moderate assistance;Assist X2  Stand to Sit: Moderate assistance;Assist X2  Stand Pivot Transfers: Moderate assistance;Assist X2;Minimum assistance  Toilet Transfer: Moderate assistance;Assist X2  Gait  Overall Level of Assistance: Minimum assistance; Moderate assistance;Assist X2  Assistive Device: Walker, rolling;Gait belt        ADL  Grooming: Setup  Grooming Skilled Clinical Factors: wipe face  LE Dressing: Dependent/Total  Toileting: Dependent/Total                                    Education Given To: Patient  Education Provided: Role of Therapy;Plan of Care;Transfer Training;Energy Conservation;Orientation  Education Method: Verbal  Barriers to Learning: Cognition  Education Outcome: Verbalized understanding;Continued education needed                            AM-PAC Score        AM-PAC Inpatient Daily Activity Raw Score: 12 (11/22/23 1140)  AM-PAC Inpatient ADL T-Scale Score : 30.6 (11/22/23 1140)  ADL Inpatient CMS 0-100% Score: 66.57 (11/22/23 1140)  ADL Inpatient CMS G-Code Modifier : CL (11/22/23 1140)        Goals  Short Term Goals  Time Frame for Short Term Goals: by D/C- 11/21 cont goals  Short Term Goal 1:  Increase sitting tolerance to 20 min with SBA -  Short Term Goal 2: Tolerate transfer to chair/BSC - 11/19 goal met, updated goal- pt to transfer to 3 in1 commode with mod A  Short Term Goal 3: Roll side to side in bed SBA for skin care/ADLs -  Short Term Goal 4: 11/19 new goal-pt to do seated hygiene/grooming tasks indep after set up       Therapy Time   Individual Concurrent Group Co-treatment   Time In 1050         Time Out 1128         Minutes 38          Timed Code Treatment Minutes:   [facility]

## 2023-11-22 NOTE — PROGRESS NOTES
Physical Therapy    Daily Treatment Note      Discharge Recommendations:  Subacute/Skilled Nursing Facility  Equipment Needs:  No    Assessment:  Pt more cooperative today. Pt waxes and wanes on effort and assist level for mobility. Pt remains unsteady on feet and at risk for falls. Pt continues to be below functional baseline and would benefit from ongoing PT to increase strength and maximize mobility. Chart Reviewed: Yes   Other position/activity restrictions: up as tolerated   Additional Pertinent Hx: 80 y.o. F admitted 11/17 from care facility with AMS, decreased mobility, decreased PO and BM. + elevated LFTs, hyponatremia, leukocytosis, shoulder swelling (XR unremarkable for acute). Treatment Diagnosis: mobility impairment due to AMS      Subjective:  Pt found supine in bed. Pt agreeable for PT. \"I need to sit down. \"    Pain:  Denies pain. Objective:    Bed mobility  Supine to sit:  Min A (HOB raised, using rail, increased verbal cues, increased time/effort)    Transfers  Sit to stand: Mod A x2 (bed and BSC heights to walker, cues for hand placement)  Stand to sit:  Mod A x2 (decreased control to sit)    Ambulation  Assistance Level: Mod A x2   Assistive device:  Rolling walker  Distance:  3ft x3  Quality of gait:  shuffled steps, unsteady gait, B knee flexion, assist with walker management    Exercises  LAQ x10  Ankle Pumps x10  Seated marches x5    Neuro/balance  Sitting balance:  Fair  Static standing:  Min-Mod A (at walker for pericare)  Dynamic stance: Mod A x2 (with walker for transfers/ambulation)      Patient Education  Role of PT and d/c recommendations. Pt verbalized understanding. Safety Devices  Pt left with needs in reach, seated in chair with LEs elevated, alarm in place and RN aware.       AM-PAC score  AM-PAC Inpatient Mobility Raw Score : 10  AM-PAC Inpatient T-Scale Score : 32.29  Mobility Inpatient CMS 0-100% Score: 76.75  Mobility Inpatient CMS G-Code Modifier

## 2023-11-22 NOTE — PROGRESS NOTES
11/22/23 1056   Encounter Summary   Encounter Overview/Reason  Spiritual/Emotional Needs   Service Provided For: Patient  (Son stept out of the room for mother to spend time w/)   Referral/Consult From: Rounding   Support System Spouse; Children;Family members   Last Encounter  11/22/23  (visit w/pt, conversation, prayer ke 11/22/23)   Complexity of Encounter Moderate   Begin Time 1010   End Time  1050   Total Time Calculated 40 min   Spiritual/Emotional needs   Type Spiritual Support   Assessment/Intervention/Outcome   Assessment Concerns with suffering; Hopeful   Intervention Active listening;Discussed belief system/Jew practices/ludmila;Discussed relationship with God;Explored/Affirmed feelings, thoughts, concerns;Prayer (assurance of)/[place];Sustaining Presence/Ministry of presence   Outcome Acceptance;Expressed feelings of Gia, Peace and/or Love;Expressed Gratitude     [employer]

## 2023-11-22 NOTE — CARE COORDINATION
CRYSTAL met with patient and son at bedside to discuss discharge plan. Patient and son noted that patient wants to return [facility] but feel like patient would benefit from doing a short stay in the Skilled SNF Unit @ [facility]. CRYSTAL contacted [facility] admissions director at [facility] [phone], CRYSTAL left VM with [facility] notifying that patient is wanting to go to to SNF at discharge and faxed referral.    May escoto.       Electronically signed by HARRISON Woodall on 11/22/2023 at 1:12 PM

## 2023-11-22 NOTE — PLAN OF CARE
Problem: Safety - Adult  Goal: Free from fall injury  11/22/2023 1036 by June Gary RN  Outcome: Progressing     Problem: Skin/Tissue Integrity  Goal: Absence of new skin breakdown  Description: 1. Monitor for areas of redness and/or skin breakdown  2. Assess vascular access sites hourly  3. Every 4-6 hours minimum:  Change oxygen saturation probe site  4. Every 4-6 hours:  If on nasal continuous positive airway pressure, respiratory therapy assess nares and determine need for appliance change or resting period.   Outcome: Progressing     Problem: ABCDS Injury Assessment  Goal: Absence of physical injury  Outcome: Progressing     Problem: Chronic Conditions and Co-morbidities  Goal: Patient's chronic conditions and co-morbidity symptoms are monitored and maintained or improved  Outcome: Progressing     Problem: Pain  Goal: Verbalizes/displays adequate comfort level or baseline comfort level  Outcome: Progressing  Flowsheets  Taken 11/22/2023 0432 by Neom Montelongo RN  Verbalizes/displays adequate comfort level or baseline comfort level: Encourage patient to monitor pain and request assistance  Taken 11/21/2023 2311 by Nemo Montelongo RN  Verbalizes/displays adequate comfort level or baseline comfort level: Encourage patient to monitor pain and request assistance

## 2023-11-23 LAB
ALBUMIN SERPL-MCNC: 2.2 G/DL (ref 3.4–5)
ALBUMIN SERPL-MCNC: 2.7 G/DL (ref 3.4–5)
ALBUMIN/GLOB SERPL: 0.6 {RATIO} (ref 1.1–2.2)
ALBUMIN/GLOB SERPL: 0.7 {RATIO} (ref 1.1–2.2)
ALP SERPL-CCNC: 147 U/L (ref 40–129)
ALP SERPL-CCNC: 173 U/L (ref 40–129)
ALT SERPL-CCNC: 34 U/L (ref 10–40)
ALT SERPL-CCNC: 34 U/L (ref 10–40)
ANION GAP SERPL CALCULATED.3IONS-SCNC: 8 MMOL/L (ref 3–16)
ANION GAP SERPL CALCULATED.3IONS-SCNC: 9 MMOL/L (ref 3–16)
AST SERPL-CCNC: 29 U/L (ref 15–37)
AST SERPL-CCNC: 35 U/L (ref 15–37)
BACTERIA BLD CULT ORG #2: NORMAL
BACTERIA BLD CULT: NORMAL
BILIRUB SERPL-MCNC: 0.5 MG/DL (ref 0–1)
BILIRUB SERPL-MCNC: 0.7 MG/DL (ref 0–1)
BUN SERPL-MCNC: 16 MG/DL (ref 7–20)
BUN SERPL-MCNC: 16 MG/DL (ref 7–20)
CALCIUM SERPL-MCNC: 8.7 MG/DL (ref 8.3–10.6)
CALCIUM SERPL-MCNC: 8.8 MG/DL (ref 8.3–10.6)
CHLORIDE SERPL-SCNC: 96 MMOL/L (ref 99–110)
CHLORIDE SERPL-SCNC: 99 MMOL/L (ref 99–110)
CO2 SERPL-SCNC: 21 MMOL/L (ref 21–32)
CO2 SERPL-SCNC: 25 MMOL/L (ref 21–32)
CREAT SERPL-MCNC: 0.6 MG/DL (ref 0.6–1.2)
CREAT SERPL-MCNC: 0.6 MG/DL (ref 0.6–1.2)
DEPRECATED RDW RBC AUTO: 18.1 % (ref 12.4–15.4)
GFR SERPLBLD CREATININE-BSD FMLA CKD-EPI: >60 ML/MIN/{1.73_M2}
GFR SERPLBLD CREATININE-BSD FMLA CKD-EPI: >60 ML/MIN/{1.73_M2}
GLUCOSE SERPL-MCNC: 113 MG/DL (ref 70–99)
GLUCOSE SERPL-MCNC: 123 MG/DL (ref 70–99)
HCT VFR BLD AUTO: 26 % (ref 36–48)
HGB BLD-MCNC: 8.6 G/DL (ref 12–16)
MCH RBC QN AUTO: 28.2 PG (ref 26–34)
MCHC RBC AUTO-ENTMCNC: 32.9 G/DL (ref 31–36)
MCV RBC AUTO: 85.6 FL (ref 80–100)
PLATELET # BLD AUTO: 543 K/UL (ref 135–450)
PMV BLD AUTO: 7.3 FL (ref 5–10.5)
POTASSIUM SERPL-SCNC: 3.9 MMOL/L (ref 3.5–5.1)
POTASSIUM SERPL-SCNC: 4.3 MMOL/L (ref 3.5–5.1)
PROT SERPL-MCNC: 6 G/DL (ref 6.4–8.2)
PROT SERPL-MCNC: 6.4 G/DL (ref 6.4–8.2)
RBC # BLD AUTO: 3.03 M/UL (ref 4–5.2)
SODIUM SERPL-SCNC: 121 MMOL/L (ref 136–145)
SODIUM SERPL-SCNC: 125 MMOL/L (ref 136–145)
SODIUM SERPL-SCNC: 133 MMOL/L (ref 136–145)
WBC # BLD AUTO: 9.7 K/UL (ref 4–11)

## 2023-11-23 PROCEDURE — 2580000003 HC RX 258: Performed by: FAMILY MEDICINE

## 2023-11-23 PROCEDURE — 1200000000 HC SEMI PRIVATE

## 2023-11-23 PROCEDURE — 6370000000 HC RX 637 (ALT 250 FOR IP): Performed by: INTERNAL MEDICINE

## 2023-11-23 PROCEDURE — 36415 COLL VENOUS BLD VENIPUNCTURE: CPT

## 2023-11-23 PROCEDURE — 84295 ASSAY OF SERUM SODIUM: CPT

## 2023-11-23 PROCEDURE — 6360000002 HC RX W HCPCS: Performed by: INTERNAL MEDICINE

## 2023-11-23 PROCEDURE — 2580000003 HC RX 258: Performed by: INTERNAL MEDICINE

## 2023-11-23 PROCEDURE — 6370000000 HC RX 637 (ALT 250 FOR IP): Performed by: FAMILY MEDICINE

## 2023-11-23 PROCEDURE — 80053 COMPREHEN METABOLIC PANEL: CPT

## 2023-11-23 PROCEDURE — 85027 COMPLETE CBC AUTOMATED: CPT

## 2023-11-23 RX ORDER — HYDROCODONE BITARTRATE AND ACETAMINOPHEN 5; 325 MG/1; MG/1
1 TABLET ORAL EVERY 8 HOURS PRN
Qty: 12 TABLET | Refills: 0 | Status: SHIPPED | OUTPATIENT
Start: 2023-11-23 | End: 2023-11-26

## 2023-11-23 RX ADMIN — SODIUM CHLORIDE, PRESERVATIVE FREE 10 ML: 5 INJECTION INTRAVENOUS at 09:56

## 2023-11-23 RX ADMIN — ATORVASTATIN CALCIUM 40 MG: 40 TABLET, FILM COATED ORAL at 20:30

## 2023-11-23 RX ADMIN — DONEPEZIL HYDROCHLORIDE 5 MG: 5 TABLET, FILM COATED ORAL at 20:30

## 2023-11-23 RX ADMIN — LEVOTHYROXINE SODIUM 50 MCG: 50 TABLET ORAL at 09:54

## 2023-11-23 RX ADMIN — METOPROLOL SUCCINATE 12.5 MG: 25 TABLET, EXTENDED RELEASE ORAL at 09:54

## 2023-11-23 RX ADMIN — SODIUM CHLORIDE, PRESERVATIVE FREE 10 ML: 5 INJECTION INTRAVENOUS at 20:36

## 2023-11-23 RX ADMIN — APIXABAN 2.5 MG: 2.5 TABLET, FILM COATED ORAL at 09:55

## 2023-11-23 RX ADMIN — ASPIRIN 81 MG: 81 TABLET, CHEWABLE ORAL at 09:54

## 2023-11-23 RX ADMIN — Medication 30 G: at 20:30

## 2023-11-23 RX ADMIN — CEFTRIAXONE SODIUM 2000 MG: 2 INJECTION, POWDER, FOR SOLUTION INTRAMUSCULAR; INTRAVENOUS at 17:27

## 2023-11-23 RX ADMIN — APIXABAN 2.5 MG: 2.5 TABLET, FILM COATED ORAL at 20:30

## 2023-11-23 NOTE — PLAN OF CARE
Problem: Safety - Adult  Goal: Free from fall injury  Note: She is a fall risk. Door open, and bed alarm on. Camera in room. Call light in reach. Problem: Skin/Tissue Integrity  Goal: Absence of new skin breakdown  Description: 1. Monitor for areas of redness and/or skin breakdown  2. Assess vascular access sites hourly  3. Every 4-6 hours minimum:  Change oxygen saturation probe site  4. Every 4-6 hours:  If on nasal continuous positive airway pressure, respiratory therapy assess nares and determine need for appliance change or resting period. Note: She is incontinent of urine. External catheter in place. Heels elevated off of mattress. Repositioned every 2 hours. IAD noted to ramo area and buttocks. Barrier cream applied. Problem: Discharge Planning  Goal: Discharge to home or other facility with appropriate resources  Note: She will return to [facility] at discharge. Discharge pending sodium results. Problem: Chronic Conditions and Co-morbidities  Goal: Patient's chronic conditions and co-morbidity symptoms are monitored and maintained or improved  Note: Alert to person and place. Unable to answer date, and time. Appetite is poor, encouraged intake. Problem: Pain  Goal: Verbalizes/displays adequate comfort level or baseline comfort level  Note: No complaints of pain.

## 2023-11-23 NOTE — PROGRESS NOTES
V2.0  [facility] [place] Hospitalist Progress Note      Name:  Sung Padgett /Age/Sex: 1932  (80 y.o. female)   MRN & CSN:  [id number] & [id number] Encounter Date/Time: 2023 1:59 PM EST    Location:  [address] [place] [address] PCP: Clara Grover MD       Hospital Day: 7    Assessment and Plan:   Sung Padgett is a 80 y.o. female with pmh of hypothyroidism, sick sinus syndrome s/p pacemaker, who presents with AMS (altered mental status)      Plan:    Acute metabolic encephalopathy  Dementia  -Most likely due to hyponatremia and dehydration  -Improved mentation. Sodium remains low  -Nephrology recommendations noted      Bradycardia/tachycardia   Hx of SSS S/P PPM  -Cardiology consult recommendations noted and appreciated  -PPM reprogrammed.  -Restarted on low-dose beta-blocker    Hyponatremia  On urea and salt tablets initially now held  Samsca x1  Lexapro on hold  Every 6 hours metabolic panel  Potential discharge   Nephrology consult appreciated  -Sodium had sharp drop this am. ?spurious lab  -Potential discharge today if repeat na is >130    Streptococcus bacteremia  Change to oral repeat cx are negative. Will dc on amoxicillin out of precaution    Left upper extremity swelling- negative doppler     History of depression   History of A-fib, on Eliquis and metoprolol, continue  History of hyperlipidemia        Diet ADULT DIET; Regular; Low Fat/Low Chol/High Fiber/2 gm Na;  No Added Salt (3-4 gm); 1200 ml   DVT Prophylaxis [x] Lovenox, []  Heparin, [] SCDs, [] Ambulation,  [] Eliquis, [] Xarelto  [] Coumadin   Code Status Full Code   Disposition From:   Expected Disposition:   Estimated Date of Discharge:   Patient requires continued admission due to    Surrogate Decision Maker/ POA      Personally reviewed Lab Studies and Imaging     Discussed management of the case with nephrology     EKG interpreted personally and results atrial paced rhythm    Imaging that was interpreted personally includes CT head without

## 2023-11-23 NOTE — PLAN OF CARE
Problem: Safety - Adult  Goal: Free from fall injury  Outcome: Progressing  Flowsheets (Taken 11/23/2023 0118)  Free From Fall Injury: Instruct family/caregiver on patient safety  Note: Camera in place. Reoriented pt from time to time and instructed not to get up without assistance. Problem: Discharge Planning  Goal: Discharge to home or other facility with appropriate resources  Outcome: Progressing  Flowsheets (Taken 11/23/2023 0118)  Discharge to home or other facility with appropriate resources:   Identify barriers to discharge with patient and caregiver   Arrange for needed discharge resources and transportation as appropriate   Identify discharge learning needs (meds, wound care, etc)  Note: Pt asked about plans for discharge. Informed that not discharge order at this time.

## 2023-11-23 NOTE — DISCHARGE INSTR - COC
Continuity of Care Form    Patient Name: Cheryl Bermudez   :  1932  MRN:  [id number]    [address] date:  2023  Discharge date:  ***    Code Status Order: Full Code   Advance Directives:     Admitting Physician:  Elliot Wan MD  PCP: Yuri Li MD    Discharging Nurse: [facility]/Room#: 9866/1616-19  Discharging Unit Phone Number: [phone]    Emergency Contact:   Extended Emergency Contact Information  Primary Emergency Contact: Josette Carrillo   [facility] [address] [place] Phone: [phone]  Relation: Child  Secondary Emergency Contact: [address] [facility] [place] Phone: [phone]  Work Phone: [phone]  Relation: Child    Past Surgical History:  Past Surgical History:   Procedure Laterality Date    APPENDECTOMY      BREAST SURGERY      lumpectomy - benign    CARPAL TUNNEL RELEASE Right 2014    COLONOSCOPY  2004    Sigmoid diverticulosis    COLONOSCOPY      FINGER TRIGGER RELEASE  04/10/2012    left middle finger, and left CTR    HERNIA REPAIR  1980    rt inguinal    JOINT REPLACEMENT  2011    THR right    JOINT REPLACEMENT      TKR left    PACEMAKER INSERTION  2011    [employer] pacer. Generator is safe. Leads are NOT SAFE.     THYROIDECTOMY, PARTIAL         Immunization History:   Immunization History   Administered Date(s) Administered    COVID-19, Lyle Abebe, (- formula), (age 12y+), IM, 50mcg/0.5mL 10/12/2023    COVID-19, PFIZER GRAY top, DO NOT Dilute, (age 15 y+), IM, 30 mcg/0.3 mL 2022    COVID-19, PFIZER PURPLE top, DILUTE for use, (age 15 y+), 30mcg/0.3mL 2021, 2021, 2021    Pneumococcal, PPSV23, PNEUMOVAX 23, (age 2y+), SC/IM, 0.5mL 2013       Active Problems:  Patient Active Problem List   Diagnosis Code    Trigger finger, acquired M65.30    Carpal tunnel syndrome G56.00    Pain in limb M79.609    Dermatophytosis of nail B35.1    Diplopia H53.2    Left pontine stroke ([address]) I63.9    Internuclear Safety Concerns:633627449}    Impairments/Disabilities:      { ENRIQUE Impairments/Disabilities:172512646}    Nutrition Therapy:  Current Nutrition Therapy:   [address] ENRIQUE Diet List:363473703}    Routes of Feeding: {CHP DME Other Feedings:274570746}  Liquids: {Slp liquid thickness:28171}  Daily Fluid Restriction: {CHP DME Yes amt example:330238604}  Last Modified Barium Swallow with Video (Video Swallowing Test): {Done Not Done EDCM:383132900}    Treatments at the Time of Hospital Discharge:   Respiratory Treatments: ***  Oxygen Therapy:  {Therapy; copd oxygen:32450}  Ventilator:    { CC Vent NQYK:147222927}    Rehab Therapies: {THERAPEUTIC INTERVENTION:7844911420}  Weight Bearing Status/Restrictions: [address] CC Weight Bearin}  Other Medical Equipment (for information only, NOT a DME order):  {EQUIPMENT:008471234}  Other Treatments: ***    Patient's personal belongings (please select all that are sent with patient):  {[facility] DME Belongings:731717114}    RN SIGNATURE:  {Esignature:915878368}    CASE MANAGEMENT/SOCIAL WORK SECTION    Inpatient Status Date:     Readmission Risk Assessment Score:  Readmission Risk              Risk of Unplanned Readmission:  24           CM Courtyard at [address] [facility]   [address] [facility] [address]    [phone]   [phone]              / signature: Electronically signed by Lobo Deleon on 23 at 2:02 PM EST    PHYSICIAN SECTION    Prognosis: Good    Condition at Discharge: Stable    Rehab Potential (if transferring to Rehab): Fair    Recommended Labs or Other Treatments After Discharge: cmp on  call results to [facility]    Physician Certification: I certify the above information and transfer of Austen Schaffer  is necessary for the continuing treatment of the diagnosis listed and that she requires [address] for less 30 days.      Update

## 2023-11-24 VITALS
BODY MASS INDEX: 24.1 KG/M2 | DIASTOLIC BLOOD PRESSURE: 60 MMHG | RESPIRATION RATE: 18 BRPM | WEIGHT: 144.62 LBS | TEMPERATURE: 97.6 F | SYSTOLIC BLOOD PRESSURE: 127 MMHG | OXYGEN SATURATION: 90 % | HEART RATE: 64 BPM | HEIGHT: 65 IN

## 2023-11-24 LAB
ALBUMIN SERPL-MCNC: 2.4 G/DL (ref 3.4–5)
ALBUMIN/GLOB SERPL: 0.6 {RATIO} (ref 1.1–2.2)
ALP SERPL-CCNC: 161 U/L (ref 40–129)
ALT SERPL-CCNC: 33 U/L (ref 10–40)
ANION GAP SERPL CALCULATED.3IONS-SCNC: 10 MMOL/L (ref 3–16)
AST SERPL-CCNC: 32 U/L (ref 15–37)
BILIRUB SERPL-MCNC: 0.6 MG/DL (ref 0–1)
BUN SERPL-MCNC: 37 MG/DL (ref 7–20)
CALCIUM SERPL-MCNC: 8.8 MG/DL (ref 8.3–10.6)
CHLORIDE SERPL-SCNC: 98 MMOL/L (ref 99–110)
CO2 SERPL-SCNC: 23 MMOL/L (ref 21–32)
CREAT SERPL-MCNC: 0.6 MG/DL (ref 0.6–1.2)
DEPRECATED RDW RBC AUTO: 18.1 % (ref 12.4–15.4)
GFR SERPLBLD CREATININE-BSD FMLA CKD-EPI: >60 ML/MIN/{1.73_M2}
GLUCOSE SERPL-MCNC: 103 MG/DL (ref 70–99)
HCT VFR BLD AUTO: 25.5 % (ref 36–48)
HGB BLD-MCNC: 8.4 G/DL (ref 12–16)
MCH RBC QN AUTO: 28.3 PG (ref 26–34)
MCHC RBC AUTO-ENTMCNC: 33.1 G/DL (ref 31–36)
MCV RBC AUTO: 85.4 FL (ref 80–100)
PLATELET # BLD AUTO: 537 K/UL (ref 135–450)
PMV BLD AUTO: 7.4 FL (ref 5–10.5)
POTASSIUM SERPL-SCNC: 4.3 MMOL/L (ref 3.5–5.1)
PROT SERPL-MCNC: 6.2 G/DL (ref 6.4–8.2)
RBC # BLD AUTO: 2.99 M/UL (ref 4–5.2)
SODIUM SERPL-SCNC: 131 MMOL/L (ref 136–145)
WBC # BLD AUTO: 9.5 K/UL (ref 4–11)

## 2023-11-24 PROCEDURE — 36415 COLL VENOUS BLD VENIPUNCTURE: CPT

## 2023-11-24 PROCEDURE — 85027 COMPLETE CBC AUTOMATED: CPT

## 2023-11-24 PROCEDURE — 80053 COMPREHEN METABOLIC PANEL: CPT

## 2023-11-24 PROCEDURE — 6370000000 HC RX 637 (ALT 250 FOR IP): Performed by: FAMILY MEDICINE

## 2023-11-24 PROCEDURE — 2580000003 HC RX 258: Performed by: FAMILY MEDICINE

## 2023-11-24 PROCEDURE — 6370000000 HC RX 637 (ALT 250 FOR IP): Performed by: INTERNAL MEDICINE

## 2023-11-24 RX ORDER — SODIUM CHLORIDE 1 G/1
1 TABLET ORAL 3 TIMES DAILY
Qty: 90 TABLET | Refills: 3 | Status: SHIPPED | OUTPATIENT
Start: 2023-11-24

## 2023-11-24 RX ORDER — SODIUM CHLORIDE 1 G/1
1 TABLET ORAL
Status: DISCONTINUED | OUTPATIENT
Start: 2023-11-24 | End: 2023-11-24 | Stop reason: HOSPADM

## 2023-11-24 RX ORDER — FUROSEMIDE 20 MG/1
20 TABLET ORAL DAILY
Status: DISCONTINUED | OUTPATIENT
Start: 2023-11-24 | End: 2023-11-24 | Stop reason: HOSPADM

## 2023-11-24 RX ADMIN — APIXABAN 2.5 MG: 2.5 TABLET, FILM COATED ORAL at 09:20

## 2023-11-24 RX ADMIN — FUROSEMIDE 20 MG: 20 TABLET ORAL at 09:20

## 2023-11-24 RX ADMIN — ASPIRIN 81 MG: 81 TABLET, CHEWABLE ORAL at 09:20

## 2023-11-24 RX ADMIN — METOPROLOL SUCCINATE 12.5 MG: 25 TABLET, EXTENDED RELEASE ORAL at 09:20

## 2023-11-24 RX ADMIN — SODIUM CHLORIDE 1 G: 1 TABLET ORAL at 12:36

## 2023-11-24 RX ADMIN — SODIUM CHLORIDE, PRESERVATIVE FREE 10 ML: 5 INJECTION INTRAVENOUS at 09:23

## 2023-11-24 RX ADMIN — LEVOTHYROXINE SODIUM 50 MCG: 50 TABLET ORAL at 05:07

## 2023-11-24 NOTE — PROGRESS NOTES
Na back within normal range    Needs to continue urea packs and NaCl tablets    Med rec adjusted    Jesus done    Dc order placed    Pt needs to be discharged today      Electronically signed by Deena Hensley MD on 11/24/2023 at 8:47 AM

## 2023-11-24 NOTE — CARE COORDINATION
Services Available   Skilled Nursing      Address   [address]             Contact Information    [phone]          LOC at discharge: Skilled  ENRIQUE Completed: Yes    Notification completed in HENS/PAS?:  Yes : CM has completed HENS online through secure website for SNF admission at 11/24/23. Document ID #: [id number]    IMM Completed:   Yes, Case management has presented and reviewed IMM letter #2 to the patient and/or family/ POA. Patient and/or family/POA verbalized understanding of their medicare rights and appeal process if needed. Patient and/or family/POA has signed and placed today's date (11/24/23) and time ([phone]) on letter #2 on the the appropriate lines. Patient and/or family/POA, provided copy of signed letter and they are aware that this original copy of IMM letter #2 is available prior to discharge from the paper chart on the unit. Electronic documentation has been entered into epic for IMM letter #2 and original paper copy has been added to the paper chart at the nurses station. IMM Letter given to Patient/Family/Significant other/Guardian/POA/by[de-identified] Yahir Valentine  IMM Letter date given[de-identified] 11/24/23  IMM Letter time given[de-identified] 4740    Transportation:  Transportation PLAN for discharge: EMS transportation   Mode of Transport: Ambulance stretcher - BLS  Reason for medical transport: Bed confined: Meets the following criteria 1) unable to get out of bed without assistance or ambulate, 2) unable to safely sit up in a wheelchair, 3) unable to maintain erect seating position in a chair for time needed for transport  Name of Transport Company: [facility]  Phone: [phone]  Time of Transport: 1500    Transport form completed: Not Indicated    Referrals made at [employer] for outpatient continued care:  Not Applicable    Additional CM Notes: Patient to [place] [facility] [place] [facility] today via [address] [facility] [facility] arranged for 1500. SW faxed AVS to facility.  NAKUL

## 2023-11-24 NOTE — PROGRESS NOTES
Patient is discharging to [facility]. Discharge instructions and medication instructions have been discussed with Chelsie the hand off nurse at [facility]. IV removed with no complications noted.

## 2023-11-24 NOTE — PLAN OF CARE
Problem: Skin/Tissue Integrity  Goal: Absence of new skin breakdown  Description: 1. Monitor for areas of redness and/or skin breakdown  2. Assess vascular access sites hourly  3. Every 4-6 hours minimum:  Change oxygen saturation probe site  4. Every 4-6 hours:  If on nasal continuous positive airway pressure, respiratory therapy assess nares and determine need for appliance change or resting period. 11/24/2023 0050 by Annetta Pineda RN  Outcome: Progressing  Note: Turned to sides while awake. Plan of care ongoing.      Problem: ABCDS Injury Assessment  Goal: Absence of physical injury  Outcome: Progressing  Flowsheets (Taken 11/20/2023 0737 by Peyton Vargas RN)  Absence of Physical Injury: Implement safety measures based on patient assessment

## 2023-12-11 ENCOUNTER — HOSPITAL ENCOUNTER (INPATIENT)
Age: 88
LOS: 4 days | Discharge: NURSING, REHAB OR LONG-TERM CARE | DRG: 463 | End: 2023-12-15
Attending: EMERGENCY MEDICINE | Admitting: FAMILY MEDICINE
Payer: MEDICARE

## 2023-12-11 ENCOUNTER — APPOINTMENT (OUTPATIENT)
Dept: CT IMAGING | Age: 88
DRG: 463 | End: 2023-12-11
Payer: MEDICARE

## 2023-12-11 ENCOUNTER — APPOINTMENT (OUTPATIENT)
Dept: GENERAL RADIOLOGY | Age: 88
DRG: 463 | End: 2023-12-11
Payer: MEDICARE

## 2023-12-11 DIAGNOSIS — M00.9 PYOGENIC ARTHRITIS OF LEFT SHOULDER REGION, DUE TO UNSPECIFIED ORGANISM (HCC): ICD-10-CM

## 2023-12-11 DIAGNOSIS — L02.419 SHOULDER ABSCESS: ICD-10-CM

## 2023-12-11 DIAGNOSIS — J18.9 PNEUMONIA OF RIGHT LOWER LOBE DUE TO INFECTIOUS ORGANISM: ICD-10-CM

## 2023-12-11 DIAGNOSIS — I50.9 ACUTE CONGESTIVE HEART FAILURE, UNSPECIFIED HEART FAILURE TYPE (HCC): ICD-10-CM

## 2023-12-11 DIAGNOSIS — J96.01 ACUTE RESPIRATORY FAILURE WITH HYPOXIA (HCC): Primary | ICD-10-CM

## 2023-12-11 LAB
ALBUMIN SERPL-MCNC: 2.7 G/DL (ref 3.4–5)
ALBUMIN/GLOB SERPL: 0.8 {RATIO} (ref 1.1–2.2)
ALP SERPL-CCNC: 137 U/L (ref 40–129)
ALT SERPL-CCNC: 19 U/L (ref 10–40)
ANION GAP SERPL CALCULATED.3IONS-SCNC: 12 MMOL/L (ref 3–16)
AST SERPL-CCNC: 45 U/L (ref 15–37)
BASE EXCESS BLDV CALC-SCNC: 4.2 MMOL/L (ref -2–3)
BASOPHILS # BLD: 0 K/UL (ref 0–0.2)
BASOPHILS NFR BLD: 0.4 %
BILIRUB SERPL-MCNC: 1.2 MG/DL (ref 0–1)
BUN SERPL-MCNC: 14 MG/DL (ref 7–20)
CALCIUM SERPL-MCNC: 8.5 MG/DL (ref 8.3–10.6)
CHLORIDE SERPL-SCNC: 99 MMOL/L (ref 99–110)
CO2 BLDV-SCNC: 31 MMOL/L
CO2 SERPL-SCNC: 24 MMOL/L (ref 21–32)
COHGB MFR BLDV: 1.7 % (ref 0–1.5)
CREAT SERPL-MCNC: 0.7 MG/DL (ref 0.6–1.2)
DEPRECATED RDW RBC AUTO: 19.7 % (ref 12.4–15.4)
DEPRECATED RDW RBC AUTO: 19.8 % (ref 12.4–15.4)
DO-HGB MFR BLDV: 68.1 %
EKG ATRIAL RATE: 62 BPM
EKG DIAGNOSIS: NORMAL
EKG Q-T INTERVAL: 480 MS
EKG QRS DURATION: 172 MS
EKG QTC CALCULATION (BAZETT): 499 MS
EKG R AXIS: -82 DEGREES
EKG T AXIS: 88 DEGREES
EKG VENTRICULAR RATE: 65 BPM
EOSINOPHIL # BLD: 0.1 K/UL (ref 0–0.6)
EOSINOPHIL NFR BLD: 0.7 %
FLUAV RNA RESP QL NAA+PROBE: NOT DETECTED
FLUBV RNA RESP QL NAA+PROBE: NOT DETECTED
GFR SERPLBLD CREATININE-BSD FMLA CKD-EPI: >60 ML/MIN/{1.73_M2}
GLUCOSE SERPL-MCNC: 97 MG/DL (ref 70–99)
HCO3 BLDV-SCNC: 29.1 MMOL/L (ref 24–28)
HCT VFR BLD AUTO: 26.8 % (ref 36–48)
HCT VFR BLD AUTO: 29.4 % (ref 36–48)
HGB BLD-MCNC: 8.7 G/DL (ref 12–16)
HGB BLD-MCNC: 9.2 G/DL (ref 12–16)
INR PPP: 1.87 (ref 0.84–1.16)
LACTATE BLDV-SCNC: 1.4 MMOL/L (ref 0.4–2)
LIPASE SERPL-CCNC: 51 U/L (ref 13–60)
LYMPHOCYTES # BLD: 0.8 K/UL (ref 1–5.1)
LYMPHOCYTES NFR BLD: 6.5 %
MAGNESIUM SERPL-MCNC: 2 MG/DL (ref 1.8–2.4)
MCH RBC QN AUTO: 26.9 PG (ref 26–34)
MCH RBC QN AUTO: 27.6 PG (ref 26–34)
MCHC RBC AUTO-ENTMCNC: 31.4 G/DL (ref 31–36)
MCHC RBC AUTO-ENTMCNC: 32.3 G/DL (ref 31–36)
MCV RBC AUTO: 85.6 FL (ref 80–100)
MCV RBC AUTO: 85.8 FL (ref 80–100)
METHGB MFR BLDV: 0.1 % (ref 0–1.5)
MONOCYTES # BLD: 1.1 K/UL (ref 0–1.3)
MONOCYTES NFR BLD: 9 %
NEUTROPHILS # BLD: 9.9 K/UL (ref 1.7–7.7)
NEUTROPHILS NFR BLD: 83.4 %
NT-PROBNP SERPL-MCNC: 4338 PG/ML (ref 0–449)
PCO2 BLDV: 44.7 MMHG (ref 41–51)
PH BLDV: 7.42 [PH] (ref 7.35–7.45)
PLATELET # BLD AUTO: 285 K/UL (ref 135–450)
PLATELET # BLD AUTO: 348 K/UL (ref 135–450)
PMV BLD AUTO: 7.5 FL (ref 5–10.5)
PMV BLD AUTO: 7.7 FL (ref 5–10.5)
PO2 BLDV: <30 MMHG (ref 25–40)
POTASSIUM SERPL-SCNC: 3.9 MMOL/L (ref 3.5–5.1)
PROCALCITONIN SERPL IA-MCNC: 0.1 NG/ML (ref 0–0.15)
PROT SERPL-MCNC: 6.2 G/DL (ref 6.4–8.2)
PROTHROMBIN TIME: 21.5 SEC (ref 11.5–14.8)
RBC # BLD AUTO: 3.13 M/UL (ref 4–5.2)
RBC # BLD AUTO: 3.42 M/UL (ref 4–5.2)
SAO2 % BLDV: 31 %
SARS-COV-2 RNA RESP QL NAA+PROBE: NOT DETECTED
SODIUM SERPL-SCNC: 135 MMOL/L (ref 136–145)
TROPONIN, HIGH SENSITIVITY: 112 NG/L (ref 0–14)
TROPONIN, HIGH SENSITIVITY: 112 NG/L (ref 0–14)
TROPONIN, HIGH SENSITIVITY: 118 NG/L (ref 0–14)
WBC # BLD AUTO: 10.3 K/UL (ref 4–11)
WBC # BLD AUTO: 11.9 K/UL (ref 4–11)

## 2023-12-11 PROCEDURE — 84484 ASSAY OF TROPONIN QUANT: CPT

## 2023-12-11 PROCEDURE — 99285 EMERGENCY DEPT VISIT HI MDM: CPT

## 2023-12-11 PROCEDURE — 2580000003 HC RX 258

## 2023-12-11 PROCEDURE — 96365 THER/PROPH/DIAG IV INF INIT: CPT

## 2023-12-11 PROCEDURE — 83735 ASSAY OF MAGNESIUM: CPT

## 2023-12-11 PROCEDURE — 2580000003 HC RX 258: Performed by: EMERGENCY MEDICINE

## 2023-12-11 PROCEDURE — 80053 COMPREHEN METABOLIC PANEL: CPT

## 2023-12-11 PROCEDURE — 93005 ELECTROCARDIOGRAM TRACING: CPT

## 2023-12-11 PROCEDURE — 82803 BLOOD GASES ANY COMBINATION: CPT

## 2023-12-11 PROCEDURE — 87631 RESP VIRUS 3-5 TARGETS: CPT

## 2023-12-11 PROCEDURE — 6360000002 HC RX W HCPCS: Performed by: EMERGENCY MEDICINE

## 2023-12-11 PROCEDURE — 71046 X-RAY EXAM CHEST 2 VIEWS: CPT

## 2023-12-11 PROCEDURE — 87636 SARSCOV2 & INF A&B AMP PRB: CPT

## 2023-12-11 PROCEDURE — 85610 PROTHROMBIN TIME: CPT

## 2023-12-11 PROCEDURE — 85027 COMPLETE CBC AUTOMATED: CPT

## 2023-12-11 PROCEDURE — 83690 ASSAY OF LIPASE: CPT

## 2023-12-11 PROCEDURE — 71275 CT ANGIOGRAPHY CHEST: CPT

## 2023-12-11 PROCEDURE — 83605 ASSAY OF LACTIC ACID: CPT

## 2023-12-11 PROCEDURE — 1200000000 HC SEMI PRIVATE

## 2023-12-11 PROCEDURE — 6360000004 HC RX CONTRAST MEDICATION: Performed by: EMERGENCY MEDICINE

## 2023-12-11 PROCEDURE — 83880 ASSAY OF NATRIURETIC PEPTIDE: CPT

## 2023-12-11 PROCEDURE — 36415 COLL VENOUS BLD VENIPUNCTURE: CPT

## 2023-12-11 PROCEDURE — 85025 COMPLETE CBC W/AUTO DIFF WBC: CPT

## 2023-12-11 PROCEDURE — 93005 ELECTROCARDIOGRAM TRACING: CPT | Performed by: EMERGENCY MEDICINE

## 2023-12-11 PROCEDURE — 84145 PROCALCITONIN (PCT): CPT

## 2023-12-11 PROCEDURE — 6370000000 HC RX 637 (ALT 250 FOR IP)

## 2023-12-11 RX ORDER — SODIUM CHLORIDE 9 MG/ML
INJECTION, SOLUTION INTRAVENOUS PRN
Status: DISCONTINUED | OUTPATIENT
Start: 2023-12-11 | End: 2023-12-13 | Stop reason: SDUPTHER

## 2023-12-11 RX ORDER — HYDROCODONE BITARTRATE AND ACETAMINOPHEN 5; 325 MG/1; MG/1
1 TABLET ORAL EVERY 8 HOURS PRN
COMMUNITY

## 2023-12-11 RX ORDER — ACETAMINOPHEN 650 MG/1
650 SUPPOSITORY RECTAL EVERY 6 HOURS PRN
Status: DISCONTINUED | OUTPATIENT
Start: 2023-12-11 | End: 2023-12-15 | Stop reason: HOSPADM

## 2023-12-11 RX ORDER — ACETAMINOPHEN 325 MG/1
650 TABLET ORAL EVERY 6 HOURS PRN
Status: DISCONTINUED | OUTPATIENT
Start: 2023-12-11 | End: 2023-12-15 | Stop reason: HOSPADM

## 2023-12-11 RX ORDER — SODIUM CHLORIDE 0.9 % (FLUSH) 0.9 %
5-40 SYRINGE (ML) INJECTION EVERY 12 HOURS SCHEDULED
Status: DISCONTINUED | OUTPATIENT
Start: 2023-12-11 | End: 2023-12-13 | Stop reason: SDUPTHER

## 2023-12-11 RX ORDER — HEPARIN SODIUM 1000 [USP'U]/ML
2000 INJECTION, SOLUTION INTRAVENOUS; SUBCUTANEOUS PRN
Status: DISCONTINUED | OUTPATIENT
Start: 2023-12-11 | End: 2023-12-12 | Stop reason: ALTCHOICE

## 2023-12-11 RX ORDER — HEPARIN SODIUM 1000 [USP'U]/ML
4000 INJECTION, SOLUTION INTRAVENOUS; SUBCUTANEOUS ONCE
Status: DISCONTINUED | OUTPATIENT
Start: 2023-12-11 | End: 2023-12-12

## 2023-12-11 RX ORDER — FUROSEMIDE 10 MG/ML
40 INJECTION INTRAMUSCULAR; INTRAVENOUS ONCE
Status: COMPLETED | OUTPATIENT
Start: 2023-12-11 | End: 2023-12-11

## 2023-12-11 RX ORDER — LEVOTHYROXINE SODIUM 0.05 MG/1
50 TABLET ORAL DAILY
Status: DISCONTINUED | OUTPATIENT
Start: 2023-12-12 | End: 2023-12-15 | Stop reason: HOSPADM

## 2023-12-11 RX ORDER — METOPROLOL SUCCINATE 25 MG/1
12.5 TABLET, EXTENDED RELEASE ORAL DAILY
Status: DISCONTINUED | OUTPATIENT
Start: 2023-12-12 | End: 2023-12-15 | Stop reason: HOSPADM

## 2023-12-11 RX ORDER — POLYETHYLENE GLYCOL 3350 17 G/17G
17 POWDER, FOR SOLUTION ORAL DAILY PRN
Status: DISCONTINUED | OUTPATIENT
Start: 2023-12-11 | End: 2023-12-15 | Stop reason: HOSPADM

## 2023-12-11 RX ORDER — DONEPEZIL HYDROCHLORIDE 5 MG/1
5 TABLET, FILM COATED ORAL NIGHTLY
Status: DISCONTINUED | OUTPATIENT
Start: 2023-12-11 | End: 2023-12-15 | Stop reason: HOSPADM

## 2023-12-11 RX ORDER — ONDANSETRON 2 MG/ML
4 INJECTION INTRAMUSCULAR; INTRAVENOUS EVERY 6 HOURS PRN
Status: DISCONTINUED | OUTPATIENT
Start: 2023-12-11 | End: 2023-12-13 | Stop reason: SDUPTHER

## 2023-12-11 RX ORDER — M-VIT,TX,IRON,MINS/CALC/FOLIC 27MG-0.4MG
1 TABLET ORAL DAILY
COMMUNITY

## 2023-12-11 RX ORDER — HEPARIN SODIUM 10000 [USP'U]/100ML
5-30 INJECTION, SOLUTION INTRAVENOUS CONTINUOUS
Status: DISCONTINUED | OUTPATIENT
Start: 2023-12-11 | End: 2023-12-12

## 2023-12-11 RX ORDER — FERROUS SULFATE 325(65) MG
325 TABLET ORAL DAILY
COMMUNITY

## 2023-12-11 RX ORDER — HEPARIN SODIUM 1000 [USP'U]/ML
4000 INJECTION, SOLUTION INTRAVENOUS; SUBCUTANEOUS PRN
Status: DISCONTINUED | OUTPATIENT
Start: 2023-12-11 | End: 2023-12-12 | Stop reason: ALTCHOICE

## 2023-12-11 RX ORDER — SODIUM CHLORIDE 0.9 % (FLUSH) 0.9 %
5-40 SYRINGE (ML) INJECTION PRN
Status: DISCONTINUED | OUTPATIENT
Start: 2023-12-11 | End: 2023-12-13 | Stop reason: SDUPTHER

## 2023-12-11 RX ORDER — FUROSEMIDE 20 MG/1
20 TABLET ORAL DAILY
COMMUNITY

## 2023-12-11 RX ORDER — ONDANSETRON 4 MG/1
4 TABLET, ORALLY DISINTEGRATING ORAL EVERY 8 HOURS PRN
Status: DISCONTINUED | OUTPATIENT
Start: 2023-12-11 | End: 2023-12-13 | Stop reason: SDUPTHER

## 2023-12-11 RX ORDER — ATORVASTATIN CALCIUM 40 MG/1
40 TABLET, FILM COATED ORAL NIGHTLY
Status: DISCONTINUED | OUTPATIENT
Start: 2023-12-11 | End: 2023-12-15 | Stop reason: HOSPADM

## 2023-12-11 RX ADMIN — VANCOMYCIN HYDROCHLORIDE 1750 MG: 10 INJECTION, POWDER, LYOPHILIZED, FOR SOLUTION INTRAVENOUS at 19:00

## 2023-12-11 RX ADMIN — CEFEPIME HYDROCHLORIDE 2000 MG: 2 INJECTION, POWDER, FOR SOLUTION INTRAVENOUS at 16:15

## 2023-12-11 RX ADMIN — FUROSEMIDE 40 MG: 10 INJECTION, SOLUTION INTRAMUSCULAR; INTRAVENOUS at 19:29

## 2023-12-11 RX ADMIN — ATORVASTATIN CALCIUM 40 MG: 40 TABLET, FILM COATED ORAL at 23:49

## 2023-12-11 RX ADMIN — IOPAMIDOL 75 ML: 755 INJECTION, SOLUTION INTRAVENOUS at 17:11

## 2023-12-11 RX ADMIN — DONEPEZIL HYDROCHLORIDE 5 MG: 5 TABLET, FILM COATED ORAL at 23:49

## 2023-12-11 RX ADMIN — SODIUM CHLORIDE, PRESERVATIVE FREE 10 ML: 5 INJECTION INTRAVENOUS at 23:49

## 2023-12-11 NOTE — ED PROVIDER NOTES
[facility]          Attending Physician Note     Date of evaluation: 12/11/2023    Chief Complaint     Shortness of Breath (Pt has low room air oxygen and does not wear oxygen at baseline. Hx if dementia)      History of Present Illness     Stephanie Vazquez is a 80 y.o. female who comes in from her nursing facility with concern for low oxygen levels. They try to get her oxygen level and could not read at her nursing facility, the patient was also complaining of abdominal pain yesterday and chest pain today. Patient has dementia and is a very poor historian, daughter is present states has been admitted to hospital 4 times in the past few months most recently she thinks was for low sodium. Patient not been tested recently for COVID.       Review of Systems     Pertinent positive and negative findings as documented above in HPI, otherwise all other systems were reviewed and negative     Past Medical, Surgical, Family, and Social History     Medical History:   Past Medical History:   Diagnosis Date    Arthritis     CAD (coronary artery disease)     CTS (carpal tunnel syndrome)     DVT (deep venous thrombosis) ([address]) 01/01/1966    post partum    Hyperlipidemia     Hypertension     Hypothyroidism     Lumbar disc disease     Nonrheumatic aortic valve insufficiency     OAB (overactive bladder)     Pacemaker     Paroxysmal atrial fibrillation (HCC)     Psoriasis     SSS (sick sinus syndrome) (HCC)     Venous stasis        Surgical History:   Past Surgical History:   Procedure Laterality Date    APPENDECTOMY      BREAST SURGERY      lumpectomy - benign    CARPAL TUNNEL RELEASE Right 09/09/2014    COLONOSCOPY  05/04/2004    Sigmoid diverticulosis    COLONOSCOPY      FINGER TRIGGER RELEASE  04/10/2012    left middle finger, and left CTR    HERNIA REPAIR  01/01/1980    rt inguinal    JOINT REPLACEMENT  01/01/2011    THR right    JOINT REPLACEMENT      TKR left    PACEMAKER INSERTION pneumonia          LABS:   Results for orders placed or performed during the hospital encounter of 12/11/23   COVID-19 & Influenza Combo    Specimen: Nasopharyngeal Swab   Result Value Ref Range    SARS-CoV-2 RNA, RT PCR NOT DETECTED NOT DETECTED    INFLUENZA A NOT DETECTED NOT DETECTED    INFLUENZA B NOT DETECTED NOT DETECTED   CBC with Auto Differential   Result Value Ref Range    WBC 11.9 (H) 4.0 - 11.0 K/uL    RBC 3.42 (L) 4.00 - 5.20 M/uL    Hemoglobin 9.2 (L) 12.0 - 16.0 g/dL    Hematocrit 29.4 (L) 36.0 - 48.0 %    MCV 85.8 80.0 - 100.0 fL    MCH 26.9 26.0 - 34.0 pg    MCHC 31.4 31.0 - 36.0 g/dL    RDW 19.8 (H) 12.4 - 15.4 %    Platelets 951 707 - 329 K/uL    MPV 7.7 5.0 - 10.5 fL    Neutrophils % 83.4 %    Lymphocytes % 6.5 %    Monocytes % 9.0 %    Eosinophils % 0.7 %    Basophils % 0.4 %    Neutrophils Absolute 9.9 (H) 1.7 - 7.7 K/uL    Lymphocytes Absolute 0.8 (L) 1.0 - 5.1 K/uL    Monocytes Absolute 1.1 0.0 - 1.3 K/uL    Eosinophils Absolute 0.1 0.0 - 0.6 K/uL    Basophils Absolute 0.0 0.0 - 0.2 K/uL   CMP w/ Reflex to MG   Result Value Ref Range    Sodium 135 (L) 136 - 145 mmol/L    Potassium reflex Magnesium 3.9 3.5 - 5.1 mmol/L    Chloride 99 99 - 110 mmol/L    CO2 24 21 - 32 mmol/L    Anion Gap 12 3 - 16    Glucose 97 70 - 99 mg/dL    BUN 14 7 - 20 mg/dL    Creatinine 0.7 0.6 - 1.2 mg/dL    Est, Glom Filt Rate >60 >60    Calcium 8.5 8.3 - 10.6 mg/dL    Total Protein 6.2 (L) 6.4 - 8.2 g/dL    Albumin 2.7 (L) 3.4 - 5.0 g/dL    Albumin/Globulin Ratio 0.8 (L) 1.1 - 2.2    Total Bilirubin 1.2 (H) 0.0 - 1.0 mg/dL    Alkaline Phosphatase 137 (H) 40 - 129 U/L    ALT 19 10 - 40 U/L    AST 45 (H) 15 - 37 U/L   Lipase   Result Value Ref Range    Lipase 51.0 13.0 - 60.0 U/L   Blood Gas, Venous   Result Value Ref Range    pH, Moises 7.422 7.350 - 7.450    pCO2, Moises 44.7 41.0 - 51.0 mmHg    pO2, Moises <30.0 25.0 - 40.0 mmHg    HCO3, Venous 29.1 (H) 24.0 - 28.0 mmol/L    Base Excess, Moises 4.2 (H) -2.0 - 3.0 mmol/L    O2

## 2023-12-11 NOTE — H&P
ICU/Internal Medicine Note    Patient Name: Berta Bella   Admit Date: 12/11/2023   Code:Prior  PCP: Tip Fung MD   Attending: Valdo Marroquin MD    Chief Complaint: Shortness of Breath (Pt has low room air oxygen and does not wear oxygen at baseline. Hx if dementia)       Subjective   HPI:   Berta Bella is a 80 y.o. female with history significant of dementia, sick sinus syndrome (bipolar pacemaker), Paroxysmal A-fib on Eliquis, previous CVA, and recent hospital admission in Nov 2023 for AMS due to Hyponatremia and Streptococcus bacteremia with inpatient treatment with CTX, repeat blood cx neg x 2, and discharged w/ampicillin. She presents today from her nursing home due to nursing ome \"unable to get oxygen reading on her,\" chest pain today, and abdominal pain yesterday. On my evaluation, she is desatting to 85 without oxygen and satting at 97 and above with 3 L O2. She is alert and oriented to self. She thinks the year is 46 and that she is in [place]. When I spoke to her family, they informed me that she has dementia and some days are better than others - she does sometimes not get the year/date right. While I was in the room for approximately 10 minutes, she did not have cough. Without supplemental oxygen, she has mild respiratory distress. She was sleeping and would randomly say yes/no to any question including when asked about the presence of chest pain. While she was sleeping, she looked comfortable and not in any distress. She has been given no pain medications here.      PMH  - Sick Sinus Syndrome (bipolar pacemaker, reprogrammed at recent visit)   - Paroxysmal A-fib: Eliquis 2.5 BID, Metoprolol succinate 12.5 daily   - HTN  - Hypothyroidism (TSH elevated at 8.76 11/17/23, Free T4 NL at 1.4)  - Previous CVA   - Coronary Artery Disease: last myocardial perfusion imaging in 2013 revealed no ischemia   - HLD: Atorvastatin 40 mg QHS     ROS:  As per HPI    ED Course:  Oriented to Lisa Johnson MD   donepezil (ARICEPT) 5 MG tablet Take 1 tablet by mouth nightly    Ino Temple MD   folic acid (FOLVITE) 1 MG tablet Take 1 tablet by mouth daily    Ino Temple MD   ondansetron (ZOFRAN) 4 MG tablet Take 1 tablet by mouth every 8 hours as needed for Nausea or Vomiting    Ino Temple MD   triamcinolone (KENALOG) 0.1 % cream Apply topically 2 times daily Apply topically 2 times daily.     ProviderIno MD   benzonatate (TESSALON) 100 MG capsule Take 1 capsule by mouth as needed for Cough    Ino Temple MD   atorvastatin (LIPITOR) 40 MG tablet Take 1 tablet by mouth nightly 9/19/23   HENRRY Phillips CNP   polyethylene glycol (GLYCOLAX) 17 g packet Take 1 packet by mouth daily as needed for Constipation 9/19/23   HENRRY Phillips CNP   apixaban (ELIQUIS) 2.5 MG TABS tablet Take 1 tablet by mouth 2 times daily 8/30/23   ProviderIno MD   diclofenac sodium (VOLTAREN) 1 % GEL Apply 4 g topically every 12 hours as needed for Pain    ProviderIno MD   bisacodyl (DULCOLAX) 10 MG suppository Place 1 suppository rectally daily as needed for Constipation    Ino Temple MD   magnesium hydroxide (MILK OF MAGNESIA) 400 MG/5ML suspension Take 30 mLs by mouth daily as needed for Constipation    ProviderIno MD   tiZANidine (ZANAFLEX) 2 MG tablet Take 1 tablet by mouth as needed    ProviderIno MD   acetaminophen (TYLENOL) 325 MG tablet Take 1 tablet by mouth as needed for Pain    ProviderIno MD   sodium phosphate (FLEET) 7-19 GM/118ML Place 1 enema rectally daily as needed    ProviderIno MD   metoprolol succinate (TOPROL XL) 25 MG extended release tablet Take 0.5 tablets by mouth daily 8/28/23   João Vidal MD   Cholecalciferol (VITAMIN D PO) Take 2,000 Units by mouth daily    ProviderIno MD   levothyroxine (SYNTHROID) 50 MCG tablet Take 1 tablet by mouth daily    Provider CREATININE 0.7   CL 99   CO2 24   GLUCOSE 97   CALCIUM 8.5   MG 2.00     Recent Labs     12/11/23  1522   WBC 11.9*   HGB 9.2*   HCT 29.4*      MCV 85.8     Radiology:  XR CHEST (2 VW)   Final Result   Combined airspace consolidation and pleural fluid right lower lung suggestive   for pneumonia      CTA Chest W/ Contrast R/O PE    (Results Pending)       Medications:   vancomycin  25 mg/kg IntraVENous Once        Assessment & Plan   Apolinar Amato is a 80 y.o. female who presents from her nursing home due to \"inability to get oxygen reading\", elevated Troponin, QRS widening. Her hx is significant for dementia, sick sinus syndrome (bipolar pacemaker), Paroxysmal A-fib on Eliquis, previous CVA, and recent hospital admission in Nov 2023 for AMS due to Hyponatremia and Streptococcus bacteremia with inpatient treatment with CTX, repeat blood cx neg x 2, and discharged w/ampicillin. ACS rule out   - Trop 100s (no ESRD or JHON), QRS 0.17 in context of reported new CP earlier today (confirmed by daughter)  - Heparin gtt w/APTT monitoring Q6H for 3d started, Stat cardiology c/s, trend troponins, stat lactic acid, stat repeat EKG    RLL Pneumonia   - SpO2 85, CXR: RLL consolidation & pleural fluid. CTA neg for PE, cardiomegaly w/mod R & small L pleural effusion likely pulm edema, possible L shoulder anterior musculature soft tissue infection/abscess.  No clinical symptoms indicative of lower respiratory tract infection  - Given her recent hospitalization and blood cultures resulting in streptococcus, vanc/cef started   - Procal, MRSA, COVID/Flu, resp panel, Strep Ag, legionella Ag, Sputum culture pending     L Anterior Musculature Wall Soft tissue infection/Abscess   - afebrile, WBC 11s, no physical exam findings  - Gen surg c/s - appreciate recommendations   - on Vanc/cef for suspected pneumonia     Chronic Medical Conditions  - Dementia - continue home Donepezil 5 mg QHS   - Hypothyroidism - continue home

## 2023-12-12 ENCOUNTER — APPOINTMENT (OUTPATIENT)
Dept: ULTRASOUND IMAGING | Age: 88
DRG: 463 | End: 2023-12-12
Payer: MEDICARE

## 2023-12-12 ENCOUNTER — APPOINTMENT (OUTPATIENT)
Dept: CT IMAGING | Age: 88
DRG: 463 | End: 2023-12-12
Payer: MEDICARE

## 2023-12-12 PROBLEM — M25.412 EFFUSION OF LEFT SHOULDER: Status: ACTIVE | Noted: 2023-12-12

## 2023-12-12 PROBLEM — J96.01 ACUTE RESPIRATORY FAILURE WITH HYPOXIA (HCC): Status: ACTIVE | Noted: 2023-12-12

## 2023-12-12 PROBLEM — R79.89 ELEVATED TROPONIN: Status: ACTIVE | Noted: 2023-12-12

## 2023-12-12 PROBLEM — R06.89 RESPIRATORY INSUFFICIENCY: Status: ACTIVE | Noted: 2023-12-12

## 2023-12-12 LAB
ANION GAP SERPL CALCULATED.3IONS-SCNC: 12 MMOL/L (ref 3–16)
ANION GAP SERPL CALCULATED.3IONS-SCNC: 8 MMOL/L (ref 3–16)
APTT BLD: 33.5 SEC (ref 22.7–35.9)
APTT BLD: 34.7 SEC (ref 22.7–35.9)
APTT BLD: 36.3 SEC (ref 22.7–35.9)
APTT BLD: 40.2 SEC (ref 22.7–35.9)
BACTERIA URNS QL MICRO: ABNORMAL /HPF
BASOPHILS # BLD: 0.1 K/UL (ref 0–0.2)
BASOPHILS # BLD: 0.1 K/UL (ref 0–0.2)
BASOPHILS NFR BLD: 0.6 %
BASOPHILS NFR BLD: 0.7 %
BILIRUB UR QL STRIP.AUTO: NEGATIVE
BUN SERPL-MCNC: 12 MG/DL (ref 7–20)
BUN SERPL-MCNC: 12 MG/DL (ref 7–20)
CA-I BLD-SCNC: 1.06 MMOL/L (ref 1.12–1.32)
CALCIUM SERPL-MCNC: 8.1 MG/DL (ref 8.3–10.6)
CALCIUM SERPL-MCNC: 8.2 MG/DL (ref 8.3–10.6)
CHLORIDE SERPL-SCNC: 100 MMOL/L (ref 99–110)
CHLORIDE SERPL-SCNC: 98 MMOL/L (ref 99–110)
CLARITY UR: CLEAR
CO2 SERPL-SCNC: 24 MMOL/L (ref 21–32)
CO2 SERPL-SCNC: 27 MMOL/L (ref 21–32)
COLOR UR: YELLOW
CREAT SERPL-MCNC: 0.7 MG/DL (ref 0.6–1.2)
CREAT SERPL-MCNC: 0.7 MG/DL (ref 0.6–1.2)
CRP SERPL-MCNC: 118.1 MG/L (ref 0–5.1)
DEPRECATED RDW RBC AUTO: 19.3 % (ref 12.4–15.4)
DEPRECATED RDW RBC AUTO: 19.6 % (ref 12.4–15.4)
EKG ATRIAL RATE: 75 BPM
EKG DIAGNOSIS: NORMAL
EKG P-R INTERVAL: 190 MS
EKG Q-T INTERVAL: 460 MS
EKG QRS DURATION: 172 MS
EKG QTC CALCULATION (BAZETT): 513 MS
EKG R AXIS: -76 DEGREES
EKG T AXIS: 97 DEGREES
EKG VENTRICULAR RATE: 75 BPM
EOSINOPHIL # BLD: 0.1 K/UL (ref 0–0.6)
EOSINOPHIL # BLD: 0.2 K/UL (ref 0–0.6)
EOSINOPHIL NFR BLD: 1 %
EOSINOPHIL NFR BLD: 1.9 %
EPI CELLS #/AREA URNS HPF: ABNORMAL /HPF (ref 0–5)
GFR SERPLBLD CREATININE-BSD FMLA CKD-EPI: >60 ML/MIN/{1.73_M2}
GFR SERPLBLD CREATININE-BSD FMLA CKD-EPI: >60 ML/MIN/{1.73_M2}
GLUCOSE SERPL-MCNC: 80 MG/DL (ref 70–99)
GLUCOSE SERPL-MCNC: 98 MG/DL (ref 70–99)
GLUCOSE UR STRIP.AUTO-MCNC: NEGATIVE MG/DL
HCT VFR BLD AUTO: 25.9 % (ref 36–48)
HCT VFR BLD AUTO: 28.1 % (ref 36–48)
HGB BLD-MCNC: 8.6 G/DL (ref 12–16)
HGB BLD-MCNC: 8.9 G/DL (ref 12–16)
HGB UR QL STRIP.AUTO: ABNORMAL
KETONES UR STRIP.AUTO-MCNC: NEGATIVE MG/DL
LEUKOCYTE ESTERASE UR QL STRIP.AUTO: NEGATIVE
LYMPHOCYTES # BLD: 0.8 K/UL (ref 1–5.1)
LYMPHOCYTES # BLD: 0.9 K/UL (ref 1–5.1)
LYMPHOCYTES NFR BLD: 9.4 %
LYMPHOCYTES NFR BLD: 9.4 %
MAGNESIUM SERPL-MCNC: 1.8 MG/DL (ref 1.8–2.4)
MAGNESIUM SERPL-MCNC: 1.8 MG/DL (ref 1.8–2.4)
MCH RBC QN AUTO: 27.1 PG (ref 26–34)
MCH RBC QN AUTO: 27.5 PG (ref 26–34)
MCHC RBC AUTO-ENTMCNC: 31.7 G/DL (ref 31–36)
MCHC RBC AUTO-ENTMCNC: 33.3 G/DL (ref 31–36)
MCV RBC AUTO: 82.5 FL (ref 80–100)
MCV RBC AUTO: 85.6 FL (ref 80–100)
MONOCYTES # BLD: 0.7 K/UL (ref 0–1.3)
MONOCYTES # BLD: 0.8 K/UL (ref 0–1.3)
MONOCYTES NFR BLD: 8.7 %
MONOCYTES NFR BLD: 8.7 %
NEUTROPHILS # BLD: 6.6 K/UL (ref 1.7–7.7)
NEUTROPHILS # BLD: 7.8 K/UL (ref 1.7–7.7)
NEUTROPHILS NFR BLD: 79.3 %
NEUTROPHILS NFR BLD: 80.3 %
NITRITE UR QL STRIP.AUTO: NEGATIVE
PH UR STRIP.AUTO: 6.5 [PH] (ref 5–8)
PH VENOUS: 7.39 (ref 7.35–7.45)
PLATELET # BLD AUTO: 306 K/UL (ref 135–450)
PLATELET # BLD AUTO: 311 K/UL (ref 135–450)
PMV BLD AUTO: 7.5 FL (ref 5–10.5)
PMV BLD AUTO: 8.1 FL (ref 5–10.5)
POTASSIUM SERPL-SCNC: 2.8 MMOL/L (ref 3.5–5.1)
POTASSIUM SERPL-SCNC: 3.5 MMOL/L (ref 3.5–5.1)
PROT UR STRIP.AUTO-MCNC: NEGATIVE MG/DL
RBC # BLD AUTO: 3.14 M/UL (ref 4–5.2)
RBC # BLD AUTO: 3.28 M/UL (ref 4–5.2)
RBC #/AREA URNS HPF: ABNORMAL /HPF (ref 0–4)
REPORT: NORMAL
RESP PATH DNA+RNA PNL NPH NAA+NON-PROBE: NORMAL
SODIUM SERPL-SCNC: 134 MMOL/L (ref 136–145)
SODIUM SERPL-SCNC: 135 MMOL/L (ref 136–145)
SP GR UR STRIP.AUTO: 1.01 (ref 1–1.03)
TROPONIN, HIGH SENSITIVITY: 118 NG/L (ref 0–14)
UA COMPLETE W REFLEX CULTURE PNL UR: ABNORMAL
UA DIPSTICK W REFLEX MICRO PNL UR: YES
URN SPEC COLLECT METH UR: ABNORMAL
UROBILINOGEN UR STRIP-ACNC: 0.2 E.U./DL
WBC # BLD AUTO: 8.3 K/UL (ref 4–11)
WBC # BLD AUTO: 9.7 K/UL (ref 4–11)
WBC #/AREA URNS HPF: ABNORMAL /HPF (ref 0–5)

## 2023-12-12 PROCEDURE — 0202U NFCT DS 22 TRGT SARS-COV-2: CPT

## 2023-12-12 PROCEDURE — 73201 CT UPPER EXTREMITY W/DYE: CPT

## 2023-12-12 PROCEDURE — 1200000000 HC SEMI PRIVATE

## 2023-12-12 PROCEDURE — 99222 1ST HOSP IP/OBS MODERATE 55: CPT | Performed by: SURGERY

## 2023-12-12 PROCEDURE — 6370000000 HC RX 637 (ALT 250 FOR IP)

## 2023-12-12 PROCEDURE — 6370000000 HC RX 637 (ALT 250 FOR IP): Performed by: STUDENT IN AN ORGANIZED HEALTH CARE EDUCATION/TRAINING PROGRAM

## 2023-12-12 PROCEDURE — 99222 1ST HOSP IP/OBS MODERATE 55: CPT | Performed by: INTERNAL MEDICINE

## 2023-12-12 PROCEDURE — 89050 BODY FLUID CELL COUNT: CPT

## 2023-12-12 PROCEDURE — 85025 COMPLETE CBC W/AUTO DIFF WBC: CPT

## 2023-12-12 PROCEDURE — 86140 C-REACTIVE PROTEIN: CPT

## 2023-12-12 PROCEDURE — 99223 1ST HOSP IP/OBS HIGH 75: CPT | Performed by: INTERNAL MEDICINE

## 2023-12-12 PROCEDURE — 2580000003 HC RX 258

## 2023-12-12 PROCEDURE — 85730 THROMBOPLASTIN TIME PARTIAL: CPT

## 2023-12-12 PROCEDURE — 87449 NOS EACH ORGANISM AG IA: CPT

## 2023-12-12 PROCEDURE — 84484 ASSAY OF TROPONIN QUANT: CPT

## 2023-12-12 PROCEDURE — 6360000004 HC RX CONTRAST MEDICATION: Performed by: PHYSICIAN ASSISTANT

## 2023-12-12 PROCEDURE — 87040 BLOOD CULTURE FOR BACTERIA: CPT

## 2023-12-12 PROCEDURE — 83735 ASSAY OF MAGNESIUM: CPT

## 2023-12-12 PROCEDURE — 87641 MR-STAPH DNA AMP PROBE: CPT

## 2023-12-12 PROCEDURE — 80048 BASIC METABOLIC PNL TOTAL CA: CPT

## 2023-12-12 PROCEDURE — 6360000002 HC RX W HCPCS

## 2023-12-12 PROCEDURE — 81001 URINALYSIS AUTO W/SCOPE: CPT

## 2023-12-12 PROCEDURE — 36415 COLL VENOUS BLD VENIPUNCTURE: CPT

## 2023-12-12 PROCEDURE — 82330 ASSAY OF CALCIUM: CPT

## 2023-12-12 PROCEDURE — 99222 1ST HOSP IP/OBS MODERATE 55: CPT | Performed by: PHYSICIAN ASSISTANT

## 2023-12-12 PROCEDURE — 6360000002 HC RX W HCPCS: Performed by: STUDENT IN AN ORGANIZED HEALTH CARE EDUCATION/TRAINING PROGRAM

## 2023-12-12 RX ORDER — POTASSIUM CHLORIDE 20 MEQ/1
60 TABLET, EXTENDED RELEASE ORAL ONCE
Status: COMPLETED | OUTPATIENT
Start: 2023-12-12 | End: 2023-12-12

## 2023-12-12 RX ORDER — POTASSIUM CHLORIDE 7.45 MG/ML
10 INJECTION INTRAVENOUS
Status: COMPLETED | OUTPATIENT
Start: 2023-12-12 | End: 2023-12-12

## 2023-12-12 RX ORDER — FUROSEMIDE 10 MG/ML
20 INJECTION INTRAMUSCULAR; INTRAVENOUS ONCE
Status: COMPLETED | OUTPATIENT
Start: 2023-12-12 | End: 2023-12-12

## 2023-12-12 RX ORDER — POTASSIUM CHLORIDE 20 MEQ/1
40 TABLET, EXTENDED RELEASE ORAL ONCE
Status: COMPLETED | OUTPATIENT
Start: 2023-12-12 | End: 2023-12-12

## 2023-12-12 RX ORDER — DEXTROSE MONOHYDRATE 100 MG/ML
INJECTION, SOLUTION INTRAVENOUS CONTINUOUS PRN
Status: DISCONTINUED | OUTPATIENT
Start: 2023-12-12 | End: 2023-12-15 | Stop reason: HOSPADM

## 2023-12-12 RX ORDER — GLUCAGON 1 MG/ML
1 KIT INJECTION PRN
Status: DISCONTINUED | OUTPATIENT
Start: 2023-12-12 | End: 2023-12-15 | Stop reason: HOSPADM

## 2023-12-12 RX ORDER — CALCIUM GLUCONATE 10 MG/ML
1000 INJECTION, SOLUTION INTRAVENOUS ONCE
Status: COMPLETED | OUTPATIENT
Start: 2023-12-12 | End: 2023-12-12

## 2023-12-12 RX ADMIN — POTASSIUM CHLORIDE 60 MEQ: 1500 TABLET, EXTENDED RELEASE ORAL at 03:52

## 2023-12-12 RX ADMIN — LEVOTHYROXINE SODIUM 50 MCG: 50 TABLET ORAL at 06:03

## 2023-12-12 RX ADMIN — POTASSIUM CHLORIDE 10 MEQ: 7.46 INJECTION, SOLUTION INTRAVENOUS at 04:53

## 2023-12-12 RX ADMIN — METOPROLOL SUCCINATE 12.5 MG: 25 TABLET, FILM COATED, EXTENDED RELEASE ORAL at 10:25

## 2023-12-12 RX ADMIN — CEFEPIME 2000 MG: 2 INJECTION, POWDER, FOR SOLUTION INTRAVENOUS at 10:31

## 2023-12-12 RX ADMIN — ATORVASTATIN CALCIUM 40 MG: 40 TABLET, FILM COATED ORAL at 20:49

## 2023-12-12 RX ADMIN — IOPAMIDOL 75 ML: 755 INJECTION, SOLUTION INTRAVENOUS at 11:02

## 2023-12-12 RX ADMIN — DONEPEZIL HYDROCHLORIDE 5 MG: 5 TABLET, FILM COATED ORAL at 20:49

## 2023-12-12 RX ADMIN — SODIUM CHLORIDE, PRESERVATIVE FREE 10 ML: 5 INJECTION INTRAVENOUS at 13:24

## 2023-12-12 RX ADMIN — VANCOMYCIN HYDROCHLORIDE 1250 MG: 10 INJECTION, POWDER, LYOPHILIZED, FOR SOLUTION INTRAVENOUS at 18:27

## 2023-12-12 RX ADMIN — POTASSIUM CHLORIDE 10 MEQ: 7.46 INJECTION, SOLUTION INTRAVENOUS at 07:05

## 2023-12-12 RX ADMIN — POTASSIUM CHLORIDE 10 MEQ: 7.46 INJECTION, SOLUTION INTRAVENOUS at 06:02

## 2023-12-12 RX ADMIN — CALCIUM GLUCONATE 1000 MG: 10 INJECTION, SOLUTION INTRAVENOUS at 15:08

## 2023-12-12 RX ADMIN — POTASSIUM CHLORIDE 10 MEQ: 7.46 INJECTION, SOLUTION INTRAVENOUS at 03:51

## 2023-12-12 RX ADMIN — FUROSEMIDE 20 MG: 10 INJECTION, SOLUTION INTRAMUSCULAR; INTRAVENOUS at 13:23

## 2023-12-12 RX ADMIN — POTASSIUM CHLORIDE 40 MEQ: 1500 TABLET, EXTENDED RELEASE ORAL at 10:24

## 2023-12-12 ASSESSMENT — PAIN SCALES - GENERAL: PAINLEVEL_OUTOF10: 0

## 2023-12-12 NOTE — DISCHARGE INSTRUCTIONS
Please follow-up with your primary care physician in 2 weeks. Please continue to take rest of your medications as prescribed. Please contact your primary care physician immediately if you have any symptoms of abdominal pain, fever, or any other new symptoms that needs attention. WBAT LUE. Sling as needed for comfort until 12/20/23. Remove at that time. Change waterproof dressings as needed. May shower with these dressings in place. Follow-up with Dr. Alberto Yu in 1-2 weeks. Call [phone] to schedule an appointment.

## 2023-12-12 NOTE — PLAN OF CARE
Problem: Safety - Adult  Goal: Free from fall injury  Outcome: Progressing    Bed on the lowest position, bed alarms on, siderails up, call lights within reach, gripper socks on. Problem: Cardiovascular - Adult  Goal: Maintains optimal cardiac output and hemodynamic stability  Outcome: Progressing  Patient denies chest pain AV phased on tele. Will continue to monitor. Problem: Cardiovascular - Adult  Goal: Absence of cardiac dysrhythmias or at baseline  Outcome: Progressing    Patient denies chest pain AV phased on tele. Will continue to monitor.

## 2023-12-12 NOTE — PROGRESS NOTES
Internal Medicine Progress Note    Date: 12/12/2023   Patient: [id number] Day: 1      CC: Shortness of Breath (Pt has low room air oxygen and does not wear oxygen at baseline. Hx if dementia)       Interval Hx   Surg c/s: L shoulder US, orthopedic eval  Cardio c/s: pending     VTLs WNL  How much O2 now 2-2.5   Troponin 112 > 118 > 112  BMP Na 134, K 2.8 (replenished), Ca 8.2 (albumin chronically low, Ical ordered). Procal 0.10, no leukocytosis, Hb 8.6 (8.7 last night w/NL MCV and high RDW)    UA trace blood & rare bacteria. COVID/Flu neg, other infectious workup pending     Doing same as yesterday  No signs of fluid overload on FX     HPI: Laverne Almanza is a 80 y.o. female with history significant of dementia, sick sinus syndrome (bipolar pacemaker), Paroxysmal A-fib on Eliquis, previous CVA, and recent hospital admission in Nov 2023 for AMS due to Hyponatremia and Streptococcus bacteremia with inpatient treatment with CTX, repeat blood cx neg x 2, and discharged w/ampicillin. She presents today from her nursing home due to nursing ome \"unable to get oxygen reading on her,\" chest pain today, and abdominal pain yesterday. On my evaluation, she is desatting to 85 without oxygen and satting at 97 and above with 3 L O2. She is alert and oriented to self. She thinks the year is 46 and that she is in [place]. When I spoke to her family, they informed me that she has dementia and some days are better than others - she does sometimes not get the year/date right. While I was in the room for approximately 10 minutes, she did not have cough. Without supplemental oxygen, she has mild respiratory distress. She was sleeping and would randomly say yes/no to any question including when asked about the presence of chest pain. While she was sleeping, she looked comfortable and not in any distress. She has been given no pain medications here.      ED Course:  Oriented to self, not Troponins stable. Cardio c/s pending. No lactic acidosis. RLL Pneumonia   - SpO2 85, CXR: RLL consolidation & pleural fluid. CTA neg for PE, cardiomegaly w/mod R & small L pleural effusion likely pulm edema, possible L shoulder anterior musculature soft tissue infection/abscess. No clinical symptoms indicative of lower respiratory tract infection  - Given her recent hospitalization and blood cultures resulting in streptococcus, vanc/cef started 12/11  - COVID neg, Procal NL. MRSA, resp panel, Strep Ag, legionella Ag, Sputum culture pending     L Anterior Musculature Wall Soft tissue infection/Abscess   - afebrile, WBC 11s, no physical exam findings  - Gen surg c/s - L shoulder US ordered, orthopedic eval  - on Vanc/cef for suspected pneumonia, started 12/11  - Ortho c/s: Chest CT eval     Chronic Medical Conditions  - Dementia - continue home Donepezil 5 mg QHS   - Hypothyroidism - continue home Levothyroxine 50 mcg daily  - Paroxysmal A-fib - continue Metoprolol succinate 12.5 mg daily and Eliquis 2.5 mg BID   - HLD: Atorvastatin 40 mg QHS     DVT PPx: Eliquis 2.5 mg BID   Diet: ADULT DIET;  Dysphagia - Pureed   Code status:  DNR-CCA     Will discuss with attending physician Dr. Enriqueta Benjamin MD   _____________________  Samanta Bay MD   Internal Medicine Resident, PGY-1

## 2023-12-12 NOTE — CONSULTS
Bariatric Surgery   Resident Consult Note    Reason for Consult: concern for L anterior shoulder musculature abscess    History of Present Illness:   Jovanni Martinez is a 80 y.o. female with Hx of dementia, CAD, HTN, hypothyroidism, paroxysmal A-Fib on Eliquis, and sick sinus syndrome. Pt initially presented to the hospital for ACS ruleout. Presented today from nursing home 2/2 chest pain and abdominal pain. Admitted today for PNA. CT A/P today showed incidental fluid collection in the left anterior shoulder. General surgery consulted for further recommendations. Her WBC today is WNL. She is not complaining of any pain in the area. She does endorse some shoulder pain, but reports that this is chronic. She has not had any surgery on her L shoulder. HPI somewhat limited 2/2 dementia. She is able to state that she is in a hospital but thinks it is 200. Past Medical History:        Diagnosis Date    Arthritis     CAD (coronary artery disease)     CTS (carpal tunnel syndrome)     DVT (deep venous thrombosis) ([address]) 01/01/1966    post partum    Hyperlipidemia     Hypertension     Hypothyroidism     Lumbar disc disease     Nonrheumatic aortic valve insufficiency     OAB (overactive bladder)     Pacemaker     Paroxysmal atrial fibrillation (HCC)     Psoriasis     SSS (sick sinus syndrome) (HCC)     Venous stasis        Past Surgical History:           Procedure Laterality Date    APPENDECTOMY      BREAST SURGERY      lumpectomy - benign    CARPAL TUNNEL RELEASE Right 09/09/2014    COLONOSCOPY  05/04/2004    Sigmoid diverticulosis    COLONOSCOPY      FINGER TRIGGER RELEASE  04/10/2012    left middle finger, and left CTR    HERNIA REPAIR  01/01/1980    rt inguinal    JOINT REPLACEMENT  01/01/2011    THR right    JOINT REPLACEMENT      TKR left    PACEMAKER INSERTION  01/01/2011    [employer] pacer. Generator is safe. Leads are NOT SAFE.     THYROIDECTOMY, PARTIAL         Allergies:  Patient has no known

## 2023-12-12 NOTE — PROGRESS NOTES
4 Eyes Admission Assessment     I agree as the admission nurse that 2 RN's have performed a thorough Head to Toe Skin Assessment on the patient. ALL assessment sites listed below have been assessed on admission. Areas assessed by both nurses: [place]  [x]   Head, Face, and Ears   [x]   Shoulders, Back, and Chest  [x]   Arms, Elbows, and Hands   [x]   Coccyx, Sacrum, and Ischum  [x]   Legs, Feet, and Heels        Does the Patient have Skin Breakdown? Yes a wound was noted on the Admission Assessment and an LDA was Initiated documentation include the Adriana-wound, Wound Assessment, Measurements, Dressing Treatment, Drainage, and Color\", Stage 2 on buttocks; scattered echymosis on Upper extremities, sca wounds on both lower extremities.         Cash Prevention initiated:  Yes   Wound Care Orders initiated:  Yes      [facility] nurse consulted for Pressure Injury (Stage 3,4, Unstageable, DTI, NWPT, and Complex wounds):  No      Nurse 1 eSignature: Electronically signed by Daiana Hennessy RN on 12/11/23 at 11:23 PM EST    **SHARE this note so that the co-signing nurse is able to place an eSignature**    Nurse 2 eSignature: {Esignature:[id number]}

## 2023-12-12 NOTE — CARE COORDINATION
Case Management Assessment  Initial Evaluation    Date/Time of Evaluation: 12/12/2023 3:42 PM  Assessment Completed by: Yaz Vanessa RN    If patient is discharged prior to next notation, then this note serves as note for discharge by case management. Patient Name: Orene Bosworth                   YOB: 1932  Diagnosis: Acute respiratory failure with hypoxia ([address]) [J96.01]  Pneumonia of right lower lobe due to infectious organism [J18.9]  Pneumonia due to infectious organism [J18.9]  Acute congestive heart failure, unspecified heart failure type ([address]) [I50.9]                   Date / Time: 12/11/2023  1:44 PM    Patient Admission Status: Inpatient   Readmission Risk (Low < 19, Mod (19-27), High > 27): Readmission Risk Score: 24.2    Current PCP: Annabelle Patel MD  PCP verified by CM? Yes    Chart Reviewed: Yes      History Provided by: Patient  Patient Orientation: Alert and Oriented    Patient Cognition: Short Term Memory Deficit    Hospitalization in the last 30 days (Readmission):  Yes    If yes, Readmission Assessment in  Navigator will be completed. Advance Directives:      Code Status: Limited   Patient's Primary Decision Maker is: Legal Next of Kin      Discharge Planning:    Patient lives with: Alone Type of Home: Assisted living  Primary Care Giver: Other (Comment) ([facility])  Patient Support Systems include: Children, Family Members   Current Financial resources: Medicare  Current community resources: Assisted Living  Current services prior to admission: [address]            Current DME:              Type of Home Care services:  [employer], Skilled Therapy, PT, OT    ADLS  Prior functional level: Assistance with the following:  Current functional level: Assistance with the following:    PT AM-PAC:   /24  OT AM-PAC:   /24    Family can provide assistance at DC:  Yes  Would you like Case Management to discuss the discharge plan with any other family

## 2023-12-12 NOTE — ED NOTES
.ED TO INPATIENT SBAR HANDOFF    Patient Name: Cheryl Bermudez   :  1932  80 y.o. MRN:  [id number]  Preferred Name  Russ   ED Room #:  O53/Q74-34  Family/Caregiver Present yes   Restraints no   Sitter no   Sepsis Risk Score Sepsis Risk Score: 2.96    Situation  Code Status: Prior No additional code details. Allergies: Patient has no known allergies. Weight: Patient Vitals for the past 96 hrs (Last 3 readings):   Weight   23 1555 70.5 kg (155 lb 6.4 oz)     Arrived from: nursing home  Chief Complaint:   Chief Complaint   Patient presents with    Shortness of Breath     Pt has low room air oxygen and does not wear oxygen at baseline. Hx if dementia     Hospital Problem/Diagnosis:  Principal Problem:    Pneumonia due to infectious organism  Resolved Problems:    * No resolved hospital problems. *    Imaging:   CTA Chest W/ Contrast R/O PE   Final Result      1. Evaluation of peripheral vasculature is degraded by respiratory motion. No evidence of central or proximal segmental pulmonary emboli. 2.  Cardiomegaly with moderate right and small left pleural effusion with likely pulmonary edema. 3.  Hypodense enlargement of the left shoulder anterior musculature with small foci of air concerning for soft tissue infection/abscess.       Electronically signed by Josh Richmond MD      XR CHEST (2 VW)   Final Result   Combined airspace consolidation and pleural fluid right lower lung suggestive   for pneumonia        Abnormal labs:   Abnormal Labs Reviewed   CBC WITH AUTO DIFFERENTIAL - Abnormal; Notable for the following components:       Result Value    WBC 11.9 (*)     RBC 3.42 (*)     Hemoglobin 9.2 (*)     Hematocrit 29.4 (*)     RDW 19.8 (*)     Neutrophils Absolute 9.9 (*)     Lymphocytes Absolute 0.8 (*)     All other components within normal limits   COMPREHENSIVE METABOLIC PANEL W/ REFLEX TO MG FOR LOW K - Abnormal; Notable for the following components:    Sodium 135 (*)     Total Protein 6.2 (*) follow conversation  Orientation Level:    NIH Score:    C-SSRS: Risk of Suicide: No Risk  Bedside swallow:    [place] Coma Scale (GCS): Jose Rafael Coma Scale  Eye Opening: Spontaneous  Best Verbal Response: Confused  Best Motor Response: Obeys commands  [place] Coma Scale Score: 14  Active LDA's:   Peripheral IV 12/11/23 Left Forearm (Active)   Site Assessment Clean, dry & intact 12/11/23 1547   Line Status Blood return noted 12/11/23 1547   Phlebitis Assessment No symptoms 12/11/23 1547   Infiltration Assessment 0 12/11/23 1547     PO Status: Regular  Pertinent or High Risk Medications/Drips: no   If Yes, please provide details: N/A  Pending Blood Product Administration: no       You may also review the ED PT Care Timeline found under the Summary Nursing Index tab. Recommendation    Pending orders None  Plan for Discharge (if known):    Additional Comments: N/A   If any further questions, please call Sending RN at [phone]    Electronically signed by: Electronically signed by Florette Boast, RN on 12/11/2023 at 7:21 PM      Florette Boast, [address] [place] [address]  12/11/23 [id number]

## 2023-12-12 NOTE — DISCHARGE SUMMARY
INTERNAL MEDICINE DEPARTMENT AT [address] [place] [address]  DISCHARGE SUMMARY    Patient ID: Saud Pierson                                             Discharge Date: 12/15/23   Patient's PCP: Willa Dejesus MD                                          Discharge Physician: Erik Samuels MD  Admit Date: 12/11/2023   Admitting Physician: Janina Baig MD    PROBLEMS DURING HOSPITALIZATION:  Present on Admission:   Pneumonia due to infectious organism   Elevated troponin   Acute respiratory failure with hypoxia (HCC)   Respiratory insufficiency   Effusion of left shoulder   Pyogenic arthritis of left shoulder region [facility])   Shoulder abscess      HPI:  Saud Pierson is a 80 y.o. female with history significant of dementia, sick sinus syndrome (bipolar pacemaker), Paroxysmal A-fib on Eliquis, previous CVA, and recent hospital admission in Nov 2023 for AMS due to Hyponatremia and Streptococcus bacteremia with inpatient treatment with CTX, repeat blood cx neg x 2, and discharged w/ampicillin. She presents today from her nursing home due to nursing ome \"unable to get oxygen reading on her,\" chest pain today, and abdominal pain yesterday. On my evaluation, she is desatting to 85 without oxygen and satting at 97 and above with 3 L O2. She is alert and oriented to self. She thinks the year is 46 and that she is in [place]. When I spoke to her family, they informed me that she has dementia and some days are better than others - she does sometimes not get the year/date right. While I was in the room for approximately 10 minutes, she did not have cough. Without supplemental oxygen, she has mild respiratory distress. She was sleeping and would randomly say yes/no to any question including when asked about the presence of chest pain. While she was sleeping, she looked comfortable and not in any distress. She has been given no pain medications here.      CT PE neg, but soft tissue infection observed an L anterior musculature      PMH  - Sick Sinus Syndrome (bipolar pacemaker, reprogrammed at recent visit)   - Paroxysmal A-fib: Eliquis 2.5 BID, Metoprolol succinate 12.5 daily   - HTN  - Hypothyroidism (TSH elevated at 8.76 11/17/23, Free T4 NL at 1.4)  - Previous CVA   - Coronary Artery Disease: last myocardial perfusion imaging in 2013 revealed no ischemia   - HLD: Atorvastatin 40 mg QHS     The following issues were addressed during hospitalization:    Acute coronary syndrome was ruled out due to stable Troponin values. Cardiology was consulted. Repeat EKG showed no changes. There was low suspicion of pneumonia. CXR showed some RLL findings, however she was afebrile, did not have leukocytosis, and did not have any symptoms. She was still started on Vancomycin and cefepime given bacteremia at her last hospitalization. Chest CT PE ruled out PE but did show L anterior chest muscle abscess or soft tissue finding. General surgery recommended an ultrasound and orthopedic consult. L shoulder CT showed septic bursitis. Ortho did I&D for this on 12/13. Antibiotics discontinued by infectious disease on 12/12 as patient not septic. Restarted after incision & drainage. Infectious disease recommended Linezolid 600 mg BID for 3 weeks at discharge with weekly CBC for 3 weeks. Echocardiogram was ordered for workup of hypoxia. Echo showed NL LV cavity size & wall thickness, EF > 60%. LA mild to moderate dilation. Pulmonary arterial HTN. Cardiology was consulted, cardiology did not recommend starting anything at discharge. Medical team ordered Torsemide 10 PRN for orthopnea or SOB. Her home Donepezil, Levothyroxine, Metoprolol, Eliquis, & Atorvastatin were continued. At the time of discharge, patient reported feeling stable. They were not in acute distress and vital signs were within normal limits.  Further, the patient expressed appropriate understanding of, and agreement with, the discharge recommendations, medications, and suggestive   for pneumonia      IR FLUORO GUIDED NEEDLE PLACEMENT    (Results Pending)   US ASP ABSCESS/HEMATOMA/BULLA/CYST    (Results Pending)      Treatments: Antibiotics, Supplemental Oxygen   Disposition: SNF  Discharged Condition: Stable   Follow Up: Primary Care Physician in two weeks    DISCHARGE MEDICATION:     Medication List        START taking these medications      linezolid 600 MG tablet  Commonly known as: Zyvox  Take 1 tablet by mouth 2 times daily for 21 days     oxyCODONE 5 MG immediate release tablet  Commonly known as: ROXICODONE  Take 1 tablet by mouth every 6 hours as needed for Pain for up to 5 days.  Max Daily Amount: 20 mg     torsemide 10 MG tablet  Commonly known as: DEMADEX  Take 1 tablet by mouth as needed (PRN for orthopnea or SOB)            CHANGE how you take these medications      metoprolol succinate 25 MG extended release tablet  Commonly known as: TOPROL XL  Take 0.5 tablets by mouth daily  What changed: additional instructions            CONTINUE taking these medications      acetaminophen 325 MG tablet  Commonly known as: TYLENOL     apixaban 2.5 MG Tabs tablet  Commonly known as: ELIQUIS     atorvastatin 40 MG tablet  Commonly known as: LIPITOR  Take 1 tablet by mouth nightly     benzonatate 100 MG capsule  Commonly known as: TESSALON     bisacodyl 10 MG suppository  Commonly known as: DULCOLAX     diclofenac sodium 1 % Gel  Commonly known as: VOLTAREN     donepezil 5 MG tablet  Commonly known as: ARICEPT     ferrous sulfate 325 (65 Fe) MG tablet  Commonly known as: IRON 887     folic acid 1 MG tablet  Commonly known as: FOLVITE     furosemide 20 MG tablet  Commonly known as: LASIX     HYDROcodone-acetaminophen 5-325 MG per tablet  Commonly known as: NORCO     levothyroxine 50 MCG tablet  Commonly known as: SYNTHROID     magnesium hydroxide 400 MG/5ML suspension  Commonly known as: MILK OF MAGNESIA     ondansetron 4 MG tablet  Commonly known as: ZOFRAN     polyethylene glycol

## 2023-12-12 NOTE — PLAN OF CARE
CT shoulder with contrast is concerning for septic arthritis  IR guided aspiration today  Can eat after, NPO at midnight  Plan for open I&D tomorrow if indicated and aspiration comes back positive    Send sample for cell count with diff and gram stain/culture

## 2023-12-12 NOTE — PROGRESS NOTES
Pharmacy Progress Note    Heparin low dose weight based infusion is ordered for patient. Per chart review, patient has received an oral Factor Xa inhibitor (12/11 in the AM, patient unsure) within the last 72 hours. As oral Factor Xa inhibitors can impact the anti-Xa test calibrated to unfractionated heparin, Heparin infusion will be monitored and adjusted using aPTT's per [address] [place] [address]. APTT algorithm:  Maximum initial infusion rate = 1000 units/hr     aPTT < 59         Heparin 60 units/kg bolus   Increase infusion by 4 units/kg/hr                             (maximum 4,000 units)   aPTT 59-72.9    Heparin 30 units/kg bolus   Increase infusion by 2 units/kg/hr                             (maximum 2,000 units)   aPTT      No bolus                              No change   aPTT 102.1-109       No bolus                       Decrease infusion by 1 unit/kg/hr   APTT 109.1-122.9    No bolus  Decrease infusion by 2 units/kg/hr   aPTT  > 123     Hold heparin for 1 hour         Decrease infusion by 3 units/kg/hr     Obtain aPTT 6 hours after initial bolus and 6 hours after any dose change until two consecutive therapeutic aPTTs are achieved - then daily. Pharmacy will monitor daily to assist with adjustments & ordering of labs as needed. If patient remains on heparin infusion 72 hours from last dose of oral factor Xa inhibitor, pharmacy will change infusion back to usual monitoring via the Anti-Xa algorithm per [address] [place] [address], as the interaction will no longer occur at that time.     Please call pharmacy with any questions -  Shawanda Marinelli PharmD  Main Pharmacy: [id number]

## 2023-12-12 NOTE — CONSULTS
Reason for Consultation/Chief Complaint: SOB    History of Present Illness:  Kelly Thomas is a 80 y.o. patient whom we were asked to see for elevated trop. Hx afib, sss, pacemaker. Presents from NH with low O2 levels. Unable to read O2 sats. Hypoxic here with O2 sat 85% on RA. Requiring supplemental O2 . Has significant dementia. Past Medical History:   has a past medical history of Arthritis, CAD (coronary artery disease), CTS (carpal tunnel syndrome), DVT (deep venous thrombosis) ([address]), Hyperlipidemia, Hypertension, Hypothyroidism, Lumbar disc disease, Nonrheumatic aortic valve insufficiency, OAB (overactive bladder), Pacemaker, Paroxysmal atrial fibrillation ([address]), Psoriasis, SSS (sick sinus syndrome) ([address]), and Venous stasis. Surgical History:   has a past surgical history that includes Appendectomy; Thyroidectomy, partial; Breast surgery; Pacemaker insertion (01/01/2011); Finger trigger release (04/10/2012); joint replacement (01/01/2011); joint replacement; Carpal tunnel release (Right, 09/09/2014); Colonoscopy (05/04/2004); hernia repair (01/01/1980); and Colonoscopy. Social History:   reports that she quit smoking about 59 years ago. She has a 1.50 pack-year smoking history. She has never used smokeless tobacco. She reports current alcohol use. She reports that she does not use drugs. Family History:  No evidence for sudden cardiac death or premature CAD    Home Medications:  Were reviewed and are listed in nursing record. and/or listed below  Prior to Admission medications    Medication Sig Start Date End Date Taking?  Authorizing Provider   ferrous sulfate (IRON 325) 325 (65 Fe) MG tablet Take 1 tablet by mouth daily   Yes Ino Temple MD   furosemide (LASIX) 20 MG tablet Take 1 tablet by mouth daily   Yes Ino Temple MD   Multiple Vitamins-Minerals (THERAPEUTIC MULTIVITAMIN-MINERALS) tablet Take 1 tablet by mouth daily   Yes Ino Temple MD

## 2023-12-12 NOTE — PLAN OF CARE
Problem: Safety - Adult  Goal: Free from fall injury  Outcome: Progressing  Patient is a high fall risk. Fall precautions in place. Hourly visual checks. Problem: Skin/Tissue Integrity  Goal: Absence of new skin breakdown  Description: 1. Monitor for areas of redness and/or skin breakdown  Outcome: Progressing  Specialty mattress ordered. Heel boots placed on patient. Cream applied to coccyx and ramo area. Mepilex   applied to shin.

## 2023-12-12 NOTE — PROGRESS NOTES
Clinical Pharmacy Progress Note    IV vancomycin - Management by Pharmacy    Consult Date(s): 12/12/23  Consulting Provider(s): Edmund Pierre MD    Assessment / Plan  PNA/CAP- Vancomycin  Concurrent Antimicrobials: Cefepime  Day of Vanc Therapy / Ordered Duration: #1  Current Dosing Method: Bayesian-Guided AUC Dosing  Therapeutic Goal: -600 mg/L*hr  Current Dose / Plan:   Vancomycin 1250 mg q24h  Estimated AUC/Trough = 505 mg/L.hr and 13mg/L respectively. Will continue to monitor clinical condition and make adjustments to regimen as appropriate. Thank you for consulting pharmacy,  David Benjamin, PharmD, BCPS      Interval update:      Subjective/Objective:   Laverne Almanza is a 80 y.o. female with a PMHx significant for dementia, sick sinus syndrome (bipolar pacemaker), pA-fib, CVA,  who is admitted with CAP. Pharmacy is consulted to dose vancomycin. Ht Readings from Last 1 Encounters:   12/11/23 1.651 m (5' 5\")     Wt Readings from Last 1 Encounters:   12/11/23 70.5 kg (155 lb 6.4 oz)     Current & Prior Antimicrobial Regimen(s):  Cefepime 2g q12h  Vancomycin 1250 mg IV q24h    Vancomycin Level(s) / Doses:    Date Time Dose Type of Level / Level Interpretation                 Note: Serum levels collected for AUC-based dosing may be high if collected in close proximity to the dose administered. This is not necessarily indicative of toxicity. Cultures & Sensitivities:    Date Site Micro Susceptibility / Result   12/11/23 Blood x2  pending            Recent Labs     12/11/23  1521 12/11/23  1522 12/11/23 1948 12/12/23  0250   CREATININE 0.7  --   --  0.7   BUN 14  --   --  12   WBC  --  11.9* 10.3 9.7       Estimated Creatinine Clearance: 52 mL/min (based on SCr of 0.7 mg/dL).     Additional Lab Values / Findings of Note:    Recent Labs     12/11/23 1948   PROCAL 0.10

## 2023-12-12 NOTE — PROGRESS NOTES
Clinical Pharmacy Progress Note    Vancomycin - Management by Pharmacy    Consult Date(s): 12/11/23  Consulting Provider(s): Dr. Ree Lyn / Plan  1)  PNA - Vancomycin  Concurrent Antimicrobials: Cefepime  Day of Vanc Therapy / Ordered Duration: 2 of 5  Current Dosing Method: Bayesian-Guided AUC Dosing  Therapeutic Goal: -600 mg/L*hr  Current Dose / Plan:   Pt received 1750mg IV x1 loading dose last evening. Will continue with 1250mg IV q24h. Regimen predicts an AUC = 509 with trough = 13.1 mcg/mL. Random level is ordered for 12/13 AM to further evaluate above regimen. Will continue to monitor clinical condition and make adjustments to regimen as appropriate. Please call with questions--  Thanks--  Kyra Eubanks, PharmD, BCPS, BCGP  [id number] (hospitals)   12/12/2023 1:01 PM      Interval update:  Seen by ortho - suspicion for left shoulder infection  very low. Subjective/Objective:   Sung Padgett is a 80 y.o. female with a PMHx significant for CAD, HTN, HLD, dementia, SSS s/p pacemaker, CVA, hypothyroidism, A.fib, OAB, venous stasis, and arthritis who is admitted with SOB - being treated for RLL pneumonia and ACS r/o. Pt also with left shoulder pain / swelling. Pharmacy is consulted to dose Vancomycin. Ht Readings from Last 1 Encounters:   12/11/23 1.651 m (5' 5\")     Wt Readings from Last 1 Encounters:   12/11/23 70.5 kg (155 lb 6.4 oz)     Current & Prior Antimicrobial Regimen(s):  Cefepime (12/11-current)  Vancomycin - Pharmacy to dose  1750mg IV x1 12/11 19:00  1250mg IV q24h (12/12-current)    Vancomycin Level(s) / Doses:    Date Time Dose Type of Level / Level Interpretation   12/13 06:00 1250mg q24h Random = ordered           Note: Serum levels collected for AUC-based dosing may be high if collected in close proximity to the dose administered. This is not necessarily indicative of toxicity.     Cultures & Sensitivities:    Date Site Micro Susceptibility / Result   12/11 COVID-19  Influenza A/B Not detected  Not detected    12/11 MRSA nasal PCR sent    12/11 Blood x2 sent    12/11 Legionella antigen sent    12/11 Strep pneumo antigen sent    12/11 Sputum  cx sent    12/12 Respiratory PCR sent            Recent Labs     12/11/23  1521 12/11/23  1522 12/11/23  1948 12/12/23  0250 12/12/23  0834   CREATININE 0.7  --   --  0.7 0.7   BUN 14  --   --  12 12   WBC  --    < > 10.3 9.7 8.3    < > = values in this interval not displayed. Estimated Creatinine Clearance: 52 mL/min (based on SCr of 0.7 mg/dL).     Additional Lab Values / Findings of Note:    Recent Labs     12/11/23 1948   PROCAL 0.10

## 2023-12-12 NOTE — PROGRESS NOTES
Pharmacy  Note  - Admission Medication History    List of [id number] medications is complete. I reviewed Rx fill history via med list from facility. The following changes made to [id number] medication list:    ADDED:   1) Ferrous Sulfate 325 mg  2) Furosemide 20 mg  3) Multivitamin  4) Hydrocodone-Acetaminophen 5-325 mg    Dose or Frequency CHANGE:  1) Benzonatate - dose changed from 100 mg to 200 mg  2) Tizanidine - frequency changed from daily PRN to Q8H PRN    REMOVED:  1)      Current Outpatient Medications   Medication Instructions    acetaminophen (TYLENOL) 325 mg, Oral, PRN    apixaban (ELIQUIS) 2.5 mg, Oral, 2 TIMES DAILY    atorvastatin (LIPITOR) 40 mg, Oral, NIGHTLY    benzonatate (TESSALON) 200 mg, Oral, EVERY 8 HOURS PRN    bisacodyl (DULCOLAX) 10 mg, Rectal, DAILY PRN    Cholecalciferol (VITAMIN D PO) 2,000 Units, Oral, DAILY    diclofenac sodium (VOLTAREN) 4 g, Topical, EVERY 12 HOURS PRN    donepezil (ARICEPT) 5 mg, Oral, NIGHTLY    ferrous sulfate (IRON 325) 325 mg, Oral, DAILY    folic acid (FOLVITE) 1 mg, Oral, DAILY    furosemide (LASIX) 20 mg, Oral, DAILY    HYDROcodone-acetaminophen (NORCO) 5-325 MG per tablet 1 tablet, Oral, EVERY 8 HOURS PRN    levothyroxine (SYNTHROID) 50 mcg, Oral, DAILY,      magnesium hydroxide (MILK OF MAGNESIA) 400 MG/5ML suspension 30 mLs, Oral, DAILY PRN    metoprolol succinate (TOPROL XL) 12.5 mg, Oral, DAILY    Multiple Vitamins-Minerals (THERAPEUTIC MULTIVITAMIN-MINERALS) tablet 1 tablet, Oral, DAILY    ondansetron (ZOFRAN) 4 mg, Oral, EVERY 8 HOURS PRN    polyethylene glycol (GLYCOLAX) 17 g, Oral, DAILY PRN    sodium chloride 1 g, Oral, 3 TIMES DAILY    sodium phosphate (FLEET) 7-19 GM/118ML 1 enema, Rectal, DAILY PRN    tiZANidine (ZANAFLEX) 2 mg, Oral, EVERY 8 HOURS PRN    triamcinolone (KENALOG) 0.1 % cream Topical, 2 TIMES DAILY, Apply topically 2 times daily.       Iker Fontana  PharmD Candidate [id number]

## 2023-12-12 NOTE — CONSULTS
Department of Orthopedic Surgery  Physician Assistant   Consult Note        Reason for Consult:  left shoulder pain and swelling   Requesting Physician: Blanca Ho MD   Date of Service: 12/12/2023 9:24 AM    CHIEF COMPLAINT:  As Above    History Obtained From:  family member - Fan Torres (son), electronic medical record    HISTORY OF PRESENT ILLNESS:                The patient is a 80 y.o. female who presents with above chief complaint. We were consulted to evaluate left shoulder fluid collection with concerns for infection noted on the CTA while ruling out PE she has a long standing history of left shoulder weakness since 2017 after a fall. The son notes that she doesn't use the arm very much any more she was walking with a walker limited at the facility that she was at prior to her most recent admission. Of which she was admitted for SOB with chest pain and abdominal pain. Past Medical History:        Diagnosis Date    Arthritis     CAD (coronary artery disease)     CTS (carpal tunnel syndrome)     DVT (deep venous thrombosis) ([address]) 01/01/1966    post partum    Hyperlipidemia     Hypertension     Hypothyroidism     Lumbar disc disease     Nonrheumatic aortic valve insufficiency     OAB (overactive bladder)     Pacemaker     Paroxysmal atrial fibrillation (HCC)     Psoriasis     SSS (sick sinus syndrome) ([facility])     Venous stasis      Past Surgical History:        Procedure Laterality Date    APPENDECTOMY      BREAST SURGERY      lumpectomy - benign    CARPAL TUNNEL RELEASE Right 09/09/2014    COLONOSCOPY  05/04/2004    Sigmoid diverticulosis    COLONOSCOPY      FINGER TRIGGER RELEASE  04/10/2012    left middle finger, and left CTR    HERNIA REPAIR  01/01/1980    rt inguinal    JOINT REPLACEMENT  01/01/2011    THR right    JOINT REPLACEMENT      TKR left    PACEMAKER INSERTION  01/01/2011    [employer] pacer. Generator is safe. Leads are NOT SAFE.     THYROIDECTOMY, PARTIAL           Medications Prior to

## 2023-12-13 ENCOUNTER — ANESTHESIA EVENT (OUTPATIENT)
Dept: OPERATING ROOM | Age: 88
End: 2023-12-13
Payer: MEDICARE

## 2023-12-13 ENCOUNTER — APPOINTMENT (OUTPATIENT)
Dept: ULTRASOUND IMAGING | Age: 88
DRG: 463 | End: 2023-12-13
Payer: MEDICARE

## 2023-12-13 ENCOUNTER — ANESTHESIA (OUTPATIENT)
Dept: OPERATING ROOM | Age: 88
End: 2023-12-13
Payer: MEDICARE

## 2023-12-13 PROBLEM — M00.9 PYOGENIC ARTHRITIS OF LEFT SHOULDER REGION (HCC): Status: ACTIVE | Noted: 2023-12-13

## 2023-12-13 LAB
ANION GAP SERPL CALCULATED.3IONS-SCNC: 8 MMOL/L (ref 3–16)
APPEARANCE FLUID: NORMAL
BASOPHILS # BLD: 0.1 K/UL (ref 0–0.2)
BASOPHILS NFR BLD: 1 %
BDY FLUID QUALITY: NORMAL
BUN SERPL-MCNC: 16 MG/DL (ref 7–20)
CALCIUM SERPL-MCNC: 8.7 MG/DL (ref 8.3–10.6)
CELL COUNT FLUID TYPE: NORMAL
CHLORIDE SERPL-SCNC: 103 MMOL/L (ref 99–110)
CO2 SERPL-SCNC: 28 MMOL/L (ref 21–32)
COLOR FLUID: NORMAL
CREAT SERPL-MCNC: 0.7 MG/DL (ref 0.6–1.2)
DEPRECATED RDW RBC AUTO: 19.8 % (ref 12.4–15.4)
EOSINOPHIL # BLD: 0.2 K/UL (ref 0–0.6)
EOSINOPHIL NFR BLD: 2.9 %
GFR SERPLBLD CREATININE-BSD FMLA CKD-EPI: >60 ML/MIN/{1.73_M2}
GLUCOSE SERPL-MCNC: 86 MG/DL (ref 70–99)
HCT VFR BLD AUTO: 29.5 % (ref 36–48)
HGB BLD-MCNC: 9.4 G/DL (ref 12–16)
LEGIONELLA AG UR QL: NORMAL
LYMPHOCYTES # BLD: 0.7 K/UL (ref 1–5.1)
LYMPHOCYTES NFR BLD: 12.5 %
LYMPHOCYTES NFR FLD: 3 %
MACROPHAGES # FLD: 1 %
MAGNESIUM SERPL-MCNC: 2.1 MG/DL (ref 1.8–2.4)
MCH RBC QN AUTO: 27.7 PG (ref 26–34)
MCHC RBC AUTO-ENTMCNC: 31.9 G/DL (ref 31–36)
MCV RBC AUTO: 86.6 FL (ref 80–100)
MONOCYTES # BLD: 0.5 K/UL (ref 0–1.3)
MONOCYTES NFR BLD: 8.7 %
MRSA DNA SPEC QL NAA+PROBE: NORMAL
NEUTROPHIL, FLUID: 96 %
NEUTROPHILS # BLD: 4 K/UL (ref 1.7–7.7)
NEUTROPHILS NFR BLD: 74.9 %
NUC CELL # FLD: NORMAL /CUMM
PLATELET # BLD AUTO: 294 K/UL (ref 135–450)
PMV BLD AUTO: 7.6 FL (ref 5–10.5)
POTASSIUM SERPL-SCNC: 3.8 MMOL/L (ref 3.5–5.1)
RBC # BLD AUTO: 3.41 M/UL (ref 4–5.2)
RBC FLUID: NORMAL /CUMM
S PNEUM AG UR QL: NORMAL
SODIUM SERPL-SCNC: 139 MMOL/L (ref 136–145)
WBC # BLD AUTO: 5.3 K/UL (ref 4–11)

## 2023-12-13 PROCEDURE — 0JBF0ZZ EXCISION OF LEFT UPPER ARM SUBCUTANEOUS TISSUE AND FASCIA, OPEN APPROACH: ICD-10-PCS | Performed by: ORTHOPAEDIC SURGERY

## 2023-12-13 PROCEDURE — 99024 POSTOP FOLLOW-UP VISIT: CPT | Performed by: PHYSICIAN ASSISTANT

## 2023-12-13 PROCEDURE — 7100000001 HC PACU RECOVERY - ADDTL 15 MIN: Performed by: ORTHOPAEDIC SURGERY

## 2023-12-13 PROCEDURE — 7100000000 HC PACU RECOVERY - FIRST 15 MIN: Performed by: ORTHOPAEDIC SURGERY

## 2023-12-13 PROCEDURE — 87015 SPECIMEN INFECT AGNT CONCNTJ: CPT

## 2023-12-13 PROCEDURE — 2580000003 HC RX 258: Performed by: INTERNAL MEDICINE

## 2023-12-13 PROCEDURE — 2709999900 HC NON-CHARGEABLE SUPPLY: Performed by: ORTHOPAEDIC SURGERY

## 2023-12-13 PROCEDURE — 23044 ARTHRT AC SC JT EXP/RMVL FB: CPT | Performed by: ORTHOPAEDIC SURGERY

## 2023-12-13 PROCEDURE — 2500000003 HC RX 250 WO HCPCS: Performed by: NURSE ANESTHETIST, CERTIFIED REGISTERED

## 2023-12-13 PROCEDURE — 2500000003 HC RX 250 WO HCPCS: Performed by: ORTHOPAEDIC SURGERY

## 2023-12-13 PROCEDURE — 87102 FUNGUS ISOLATION CULTURE: CPT

## 2023-12-13 PROCEDURE — 6360000002 HC RX W HCPCS: Performed by: INTERNAL MEDICINE

## 2023-12-13 PROCEDURE — 80048 BASIC METABOLIC PNL TOTAL CA: CPT

## 2023-12-13 PROCEDURE — 87075 CULTR BACTERIA EXCEPT BLOOD: CPT

## 2023-12-13 PROCEDURE — 87077 CULTURE AEROBIC IDENTIFY: CPT

## 2023-12-13 PROCEDURE — 2580000003 HC RX 258

## 2023-12-13 PROCEDURE — 87116 MYCOBACTERIA CULTURE: CPT

## 2023-12-13 PROCEDURE — 87205 SMEAR GRAM STAIN: CPT

## 2023-12-13 PROCEDURE — 83735 ASSAY OF MAGNESIUM: CPT

## 2023-12-13 PROCEDURE — 10160 PNXR ASPIR ABSC HMTMA BULLA: CPT

## 2023-12-13 PROCEDURE — 2580000003 HC RX 258: Performed by: NURSE ANESTHETIST, CERTIFIED REGISTERED

## 2023-12-13 PROCEDURE — 99232 SBSQ HOSP IP/OBS MODERATE 35: CPT | Performed by: PHYSICIAN ASSISTANT

## 2023-12-13 PROCEDURE — 36415 COLL VENOUS BLD VENIPUNCTURE: CPT

## 2023-12-13 PROCEDURE — 3600000004 HC SURGERY LEVEL 4 BASE: Performed by: ORTHOPAEDIC SURGERY

## 2023-12-13 PROCEDURE — 3700000000 HC ANESTHESIA ATTENDED CARE: Performed by: ORTHOPAEDIC SURGERY

## 2023-12-13 PROCEDURE — 1200000000 HC SEMI PRIVATE

## 2023-12-13 PROCEDURE — 99233 SBSQ HOSP IP/OBS HIGH 50: CPT | Performed by: INTERNAL MEDICINE

## 2023-12-13 PROCEDURE — 6370000000 HC RX 637 (ALT 250 FOR IP)

## 2023-12-13 PROCEDURE — 99232 SBSQ HOSP IP/OBS MODERATE 35: CPT | Performed by: INTERNAL MEDICINE

## 2023-12-13 PROCEDURE — 20610 DRAIN/INJ JOINT/BURSA W/O US: CPT | Performed by: PHYSICIAN ASSISTANT

## 2023-12-13 PROCEDURE — 3700000001 HC ADD 15 MINUTES (ANESTHESIA): Performed by: ORTHOPAEDIC SURGERY

## 2023-12-13 PROCEDURE — 6370000000 HC RX 637 (ALT 250 FOR IP): Performed by: PHYSICIAN ASSISTANT

## 2023-12-13 PROCEDURE — 6360000002 HC RX W HCPCS: Performed by: NURSE ANESTHETIST, CERTIFIED REGISTERED

## 2023-12-13 PROCEDURE — 87070 CULTURE OTHR SPECIMN AEROBIC: CPT

## 2023-12-13 PROCEDURE — 2500000003 HC RX 250 WO HCPCS: Performed by: PHYSICIAN ASSISTANT

## 2023-12-13 PROCEDURE — 3600000014 HC SURGERY LEVEL 4 ADDTL 15MIN: Performed by: ORTHOPAEDIC SURGERY

## 2023-12-13 PROCEDURE — 87206 SMEAR FLUORESCENT/ACID STAI: CPT

## 2023-12-13 PROCEDURE — 85025 COMPLETE CBC W/AUTO DIFF WBC: CPT

## 2023-12-13 PROCEDURE — 2580000003 HC RX 258: Performed by: PHYSICIAN ASSISTANT

## 2023-12-13 RX ORDER — POLYETHYLENE GLYCOL 3350 17 G/17G
17 POWDER, FOR SOLUTION ORAL DAILY
Status: DISCONTINUED | OUTPATIENT
Start: 2023-12-13 | End: 2023-12-15 | Stop reason: HOSPADM

## 2023-12-13 RX ORDER — ROCURONIUM BROMIDE 10 MG/ML
INJECTION, SOLUTION INTRAVENOUS PRN
Status: DISCONTINUED | OUTPATIENT
Start: 2023-12-13 | End: 2023-12-13 | Stop reason: SDUPTHER

## 2023-12-13 RX ORDER — OXYCODONE HYDROCHLORIDE 5 MG/1
5 TABLET ORAL EVERY 4 HOURS PRN
Status: DISCONTINUED | OUTPATIENT
Start: 2023-12-13 | End: 2023-12-15 | Stop reason: HOSPADM

## 2023-12-13 RX ORDER — SODIUM CHLORIDE 0.9 % (FLUSH) 0.9 %
5-40 SYRINGE (ML) INJECTION PRN
Status: DISCONTINUED | OUTPATIENT
Start: 2023-12-13 | End: 2023-12-13 | Stop reason: HOSPADM

## 2023-12-13 RX ORDER — METOCLOPRAMIDE HYDROCHLORIDE 5 MG/ML
10 INJECTION INTRAMUSCULAR; INTRAVENOUS
Status: DISCONTINUED | OUTPATIENT
Start: 2023-12-13 | End: 2023-12-13 | Stop reason: HOSPADM

## 2023-12-13 RX ORDER — LIDOCAINE HYDROCHLORIDE 10 MG/ML
5 INJECTION, SOLUTION INFILTRATION; PERINEURAL ONCE
Status: COMPLETED | OUTPATIENT
Start: 2023-12-13 | End: 2023-12-13

## 2023-12-13 RX ORDER — DIPHENHYDRAMINE HYDROCHLORIDE 50 MG/ML
12.5 INJECTION INTRAMUSCULAR; INTRAVENOUS
Status: DISCONTINUED | OUTPATIENT
Start: 2023-12-13 | End: 2023-12-13 | Stop reason: HOSPADM

## 2023-12-13 RX ORDER — DEXAMETHASONE SODIUM PHOSPHATE 4 MG/ML
INJECTION, SOLUTION INTRA-ARTICULAR; INTRALESIONAL; INTRAMUSCULAR; INTRAVENOUS; SOFT TISSUE PRN
Status: DISCONTINUED | OUTPATIENT
Start: 2023-12-13 | End: 2023-12-13 | Stop reason: SDUPTHER

## 2023-12-13 RX ORDER — ONDANSETRON 2 MG/ML
INJECTION INTRAMUSCULAR; INTRAVENOUS PRN
Status: DISCONTINUED | OUTPATIENT
Start: 2023-12-13 | End: 2023-12-13 | Stop reason: SDUPTHER

## 2023-12-13 RX ORDER — SODIUM CHLORIDE 0.9 % (FLUSH) 0.9 %
5-40 SYRINGE (ML) INJECTION PRN
Status: DISCONTINUED | OUTPATIENT
Start: 2023-12-13 | End: 2023-12-15 | Stop reason: HOSPADM

## 2023-12-13 RX ORDER — SODIUM CHLORIDE 0.9 % (FLUSH) 0.9 %
5-40 SYRINGE (ML) INJECTION EVERY 12 HOURS SCHEDULED
Status: DISCONTINUED | OUTPATIENT
Start: 2023-12-13 | End: 2023-12-15 | Stop reason: HOSPADM

## 2023-12-13 RX ORDER — SUCCINYLCHOLINE CHLORIDE 20 MG/ML
INJECTION INTRAMUSCULAR; INTRAVENOUS PRN
Status: DISCONTINUED | OUTPATIENT
Start: 2023-12-13 | End: 2023-12-13 | Stop reason: SDUPTHER

## 2023-12-13 RX ORDER — HYDROMORPHONE HYDROCHLORIDE 1 MG/ML
0.5 INJECTION, SOLUTION INTRAMUSCULAR; INTRAVENOUS; SUBCUTANEOUS EVERY 5 MIN PRN
Status: DISCONTINUED | OUTPATIENT
Start: 2023-12-13 | End: 2023-12-13 | Stop reason: HOSPADM

## 2023-12-13 RX ORDER — MORPHINE SULFATE 2 MG/ML
2 INJECTION, SOLUTION INTRAMUSCULAR; INTRAVENOUS
Status: DISCONTINUED | OUTPATIENT
Start: 2023-12-13 | End: 2023-12-15 | Stop reason: HOSPADM

## 2023-12-13 RX ORDER — MEPERIDINE HYDROCHLORIDE 25 MG/ML
12.5 INJECTION INTRAMUSCULAR; INTRAVENOUS; SUBCUTANEOUS EVERY 5 MIN PRN
Status: DISCONTINUED | OUTPATIENT
Start: 2023-12-13 | End: 2023-12-13 | Stop reason: HOSPADM

## 2023-12-13 RX ORDER — HYDROMORPHONE HYDROCHLORIDE 1 MG/ML
0.5 INJECTION, SOLUTION INTRAMUSCULAR; INTRAVENOUS; SUBCUTANEOUS EVERY 10 MIN PRN
Status: DISCONTINUED | OUTPATIENT
Start: 2023-12-13 | End: 2023-12-13 | Stop reason: HOSPADM

## 2023-12-13 RX ORDER — ACETAMINOPHEN 325 MG/1
650 TABLET ORAL EVERY 6 HOURS SCHEDULED
Status: DISCONTINUED | OUTPATIENT
Start: 2023-12-13 | End: 2023-12-15 | Stop reason: HOSPADM

## 2023-12-13 RX ORDER — PROPOFOL 10 MG/ML
INJECTION, EMULSION INTRAVENOUS PRN
Status: DISCONTINUED | OUTPATIENT
Start: 2023-12-13 | End: 2023-12-13 | Stop reason: SDUPTHER

## 2023-12-13 RX ORDER — SODIUM CHLORIDE, SODIUM LACTATE, POTASSIUM CHLORIDE, CALCIUM CHLORIDE 600; 310; 30; 20 MG/100ML; MG/100ML; MG/100ML; MG/100ML
INJECTION, SOLUTION INTRAVENOUS CONTINUOUS PRN
Status: DISCONTINUED | OUTPATIENT
Start: 2023-12-13 | End: 2023-12-13 | Stop reason: SDUPTHER

## 2023-12-13 RX ORDER — ONDANSETRON 4 MG/1
4 TABLET, ORALLY DISINTEGRATING ORAL EVERY 8 HOURS PRN
Status: DISCONTINUED | OUTPATIENT
Start: 2023-12-13 | End: 2023-12-15 | Stop reason: HOSPADM

## 2023-12-13 RX ORDER — HYDRALAZINE HYDROCHLORIDE 20 MG/ML
10 INJECTION INTRAMUSCULAR; INTRAVENOUS
Status: DISCONTINUED | OUTPATIENT
Start: 2023-12-13 | End: 2023-12-13 | Stop reason: HOSPADM

## 2023-12-13 RX ORDER — MORPHINE SULFATE 2 MG/ML
4 INJECTION, SOLUTION INTRAMUSCULAR; INTRAVENOUS
Status: DISCONTINUED | OUTPATIENT
Start: 2023-12-13 | End: 2023-12-15 | Stop reason: HOSPADM

## 2023-12-13 RX ORDER — SODIUM CHLORIDE, SODIUM LACTATE, POTASSIUM CHLORIDE, AND CALCIUM CHLORIDE .6; .31; .03; .02 G/100ML; G/100ML; G/100ML; G/100ML
IRRIGANT IRRIGATION PRN
Status: DISCONTINUED | OUTPATIENT
Start: 2023-12-13 | End: 2023-12-13 | Stop reason: ALTCHOICE

## 2023-12-13 RX ORDER — ONDANSETRON 2 MG/ML
4 INJECTION INTRAMUSCULAR; INTRAVENOUS EVERY 6 HOURS PRN
Status: DISCONTINUED | OUTPATIENT
Start: 2023-12-13 | End: 2023-12-15 | Stop reason: HOSPADM

## 2023-12-13 RX ORDER — SODIUM CHLORIDE, SODIUM LACTATE, POTASSIUM CHLORIDE, CALCIUM CHLORIDE 600; 310; 30; 20 MG/100ML; MG/100ML; MG/100ML; MG/100ML
INJECTION, SOLUTION INTRAVENOUS CONTINUOUS
Status: DISCONTINUED | OUTPATIENT
Start: 2023-12-13 | End: 2023-12-15

## 2023-12-13 RX ORDER — SODIUM CHLORIDE 9 MG/ML
INJECTION, SOLUTION INTRAVENOUS PRN
Status: DISCONTINUED | OUTPATIENT
Start: 2023-12-13 | End: 2023-12-15 | Stop reason: HOSPADM

## 2023-12-13 RX ORDER — CEFAZOLIN SODIUM 1 G/3ML
INJECTION, POWDER, FOR SOLUTION INTRAMUSCULAR; INTRAVENOUS PRN
Status: DISCONTINUED | OUTPATIENT
Start: 2023-12-13 | End: 2023-12-13 | Stop reason: SDUPTHER

## 2023-12-13 RX ORDER — SODIUM CHLORIDE 9 MG/ML
INJECTION, SOLUTION INTRAVENOUS PRN
Status: DISCONTINUED | OUTPATIENT
Start: 2023-12-13 | End: 2023-12-13 | Stop reason: HOSPADM

## 2023-12-13 RX ORDER — FENTANYL CITRATE 50 UG/ML
INJECTION, SOLUTION INTRAMUSCULAR; INTRAVENOUS PRN
Status: DISCONTINUED | OUTPATIENT
Start: 2023-12-13 | End: 2023-12-13 | Stop reason: SDUPTHER

## 2023-12-13 RX ORDER — METHOCARBAMOL 500 MG/1
750 TABLET, FILM COATED ORAL EVERY 8 HOURS PRN
Status: ACTIVE | OUTPATIENT
Start: 2023-12-13 | End: 2023-12-13

## 2023-12-13 RX ORDER — OXYCODONE HYDROCHLORIDE 5 MG/1
10 TABLET ORAL EVERY 4 HOURS PRN
Status: DISCONTINUED | OUTPATIENT
Start: 2023-12-13 | End: 2023-12-15 | Stop reason: HOSPADM

## 2023-12-13 RX ORDER — LABETALOL HYDROCHLORIDE 5 MG/ML
10 INJECTION, SOLUTION INTRAVENOUS
Status: DISCONTINUED | OUTPATIENT
Start: 2023-12-13 | End: 2023-12-13 | Stop reason: HOSPADM

## 2023-12-13 RX ORDER — LIDOCAINE HYDROCHLORIDE 20 MG/ML
INJECTION, SOLUTION INTRAVENOUS PRN
Status: DISCONTINUED | OUTPATIENT
Start: 2023-12-13 | End: 2023-12-13 | Stop reason: SDUPTHER

## 2023-12-13 RX ORDER — SODIUM CHLORIDE 0.9 % (FLUSH) 0.9 %
5-40 SYRINGE (ML) INJECTION EVERY 12 HOURS SCHEDULED
Status: DISCONTINUED | OUTPATIENT
Start: 2023-12-13 | End: 2023-12-13 | Stop reason: HOSPADM

## 2023-12-13 RX ADMIN — SUGAMMADEX 200 MG: 100 INJECTION, SOLUTION INTRAVENOUS at 16:52

## 2023-12-13 RX ADMIN — SODIUM CHLORIDE, POTASSIUM CHLORIDE, SODIUM LACTATE AND CALCIUM CHLORIDE: 600; 310; 30; 20 INJECTION, SOLUTION INTRAVENOUS at 18:57

## 2023-12-13 RX ADMIN — SUCCINYLCHOLINE CHLORIDE 100 MG: 20 INJECTION, SOLUTION INTRAMUSCULAR; INTRAVENOUS; PARENTERAL at 15:47

## 2023-12-13 RX ADMIN — SODIUM CHLORIDE, SODIUM LACTATE, POTASSIUM CHLORIDE, AND CALCIUM CHLORIDE: .6; .31; .03; .02 INJECTION, SOLUTION INTRAVENOUS at 15:36

## 2023-12-13 RX ADMIN — DEXAMETHASONE SODIUM PHOSPHATE 8 MG: 4 INJECTION, SOLUTION INTRAMUSCULAR; INTRAVENOUS at 16:00

## 2023-12-13 RX ADMIN — LIDOCAINE HYDROCHLORIDE 5 ML: 10 INJECTION, SOLUTION INFILTRATION; PERINEURAL at 09:26

## 2023-12-13 RX ADMIN — LEVOTHYROXINE SODIUM 50 MCG: 50 TABLET ORAL at 06:53

## 2023-12-13 RX ADMIN — ONDANSETRON 4 MG: 2 INJECTION INTRAMUSCULAR; INTRAVENOUS at 16:00

## 2023-12-13 RX ADMIN — CEFEPIME 2000 MG: 2 INJECTION, POWDER, FOR SOLUTION INTRAVENOUS at 18:57

## 2023-12-13 RX ADMIN — CEFAZOLIN SODIUM 2 G: 1 POWDER, FOR SOLUTION INTRAMUSCULAR; INTRAVENOUS at 15:51

## 2023-12-13 RX ADMIN — FENTANYL CITRATE 50 MCG: 50 INJECTION, SOLUTION INTRAMUSCULAR; INTRAVENOUS at 16:07

## 2023-12-13 RX ADMIN — ACETAMINOPHEN 650 MG: 325 TABLET ORAL at 18:59

## 2023-12-13 RX ADMIN — ROCURONIUM BROMIDE 30 MG: 10 INJECTION, SOLUTION INTRAVENOUS at 15:56

## 2023-12-13 RX ADMIN — ROCURONIUM BROMIDE 20 MG: 10 INJECTION, SOLUTION INTRAVENOUS at 16:05

## 2023-12-13 RX ADMIN — PROPOFOL 60 MG: 10 INJECTION, EMULSION INTRAVENOUS at 15:47

## 2023-12-13 RX ADMIN — SODIUM CHLORIDE, PRESERVATIVE FREE 10 ML: 5 INJECTION INTRAVENOUS at 09:27

## 2023-12-13 RX ADMIN — METOPROLOL SUCCINATE 12.5 MG: 25 TABLET, FILM COATED, EXTENDED RELEASE ORAL at 09:27

## 2023-12-13 RX ADMIN — FENTANYL CITRATE 50 MCG: 50 INJECTION, SOLUTION INTRAMUSCULAR; INTRAVENOUS at 16:34

## 2023-12-13 RX ADMIN — LIDOCAINE HYDROCHLORIDE 50 MG: 20 INJECTION, SOLUTION INTRAVENOUS at 15:47

## 2023-12-13 ASSESSMENT — PAIN SCALES - GENERAL
PAINLEVEL_OUTOF10: 0

## 2023-12-13 NOTE — CONSULTS
Clinical Pharmacy Progress Note    Vancomycin has been discontinued - Pharmacy will sign off on dosing  If resumed, please re-consult Pharmacy     Please call with questions:  [phone] ([facility] [place])    [place]. Col[facility]Z [facility]    12/12/2023 8:55 PM

## 2023-12-13 NOTE — OP NOTE
Orthopaedic Surgery  Operative Report      Patient Name:  Austen Schaffer  Patient :  1932  MRN: [id number]    Date: 23     Pre-operative Diagnosis:   Septic arthritis left shoulder  Baseline rotator cuff arthropathy  Sepsis    Post-operative Diagnosis:    Same    Procedure: LEFT   Left shoulder arthrotomy, excisional irrigation and debridement    Surgeon:  Surgeon(s) and Role:     Cr Land MD - Primary    Assistant: Circulator: Dora Remy RN  Surgical Assistant: Lele Cormier  Scrub Person First: Apurva Chakraborty, April    Anesthesia: General endotracheal anesthesia    Estimated blood loss: 50    Specimens: * No specimens in log *    Complications: None    Drains: None    Condition: Stable    Implants: * No implants in log *    Findings:   Gross purulence found within the shoulder joint, and creating an abscess extra-articular, but deep to the clavipectoral fascia    Indications: Patient is a 60-year-old female who recently had a stroke. She has very low level of baseline function of chronic left shoulder dysfunction with rotator cuff arthropathy. She was admitted for failure to thrive, concern for sepsis, possible CHF exacerbation and/or pneumonia. She was treated empirically with IV antibiotics. CT of the chest demonstrated fluid collection in the shoulder. Dedicated scan of the shoulder demonstrated likely concern for abscess. Aspiration yielded concern for infection. Left shoulder irrigation debridement was indicated. The patient and family understood the risks, benefits, alternatives and wanted to proceed. Procedure Details:   I marked the left shoulder as the operative site. Patient was wheeled back to the OR and transferred to the OR table. Extra IV access was achieved by anesthesia. General anesthesia was induced. Left upper extremity was prepped in the standard sterile fashion with the head secured on a [employer] table. Timeout was performed. Perioperative Ancef was delivered. Patient already remains on scheduled vancomycin and cefepime. I made an anterior approach to the shoulder. Subcutaneous dissection was performed. Deltopectoral interval was explored. I was careful to protect any wires near the pacemaker. We were clear from the pacemaker a few centimeters away on the chest wall. Underlying dissection through the clavipectoral fascia yielded a significant mucopurulent discharge. This abscess and septic arthritis was evacuated. 3 cultures were taken, 2 tissue, and 1 swab fluid. The wound was then irrigated thoroughly. I removed using a rongeur and scalpel parts of the devitalized fascia, remnants of the rotator cuff, remnants of the joint itself. The abscess was evacuated medial to the conjoined tendon as it communicated into the chest wall. This also communicated deep to the deltoid. All of this was evacuated. Thorough irrigation was performed. I closed over a medium Hemovac drain placed deep to the deltoid. #1 PDS approximated the deltopectoral interval.  Overlying 2-0 STRATAFIX closure was performed for the subcutaneous region, followed by staples. Nonadhesive dressings applied. Patient was awakened from general anesthesia and transported to the PACU without complications. All instrument counts were correct x 2. I was present throughout entire to the case.     Plan:  -Activity as tolerated left shoulder  -Sling for comfort only for 3 to 7 days  -Active use of the arm if / when pain allows  -Start IV antibiotics, will need likely long-term IV antibiotics with PICC line  -ID consult  -Drain management, keep in place until output less than 30 per day  -Dispo: Transfer back to inpatient, medical management        Electronically signed by Lonny Oseguera MD on 12/13/2023 at 4:31 PM

## 2023-12-13 NOTE — PLAN OF CARE
Problem: Safety - Adult  Goal: Free from fall injury  12/12/2023 2149 by Rose Yadav RN  Outcome: Progressing  Bed alarms on,siderails up,bed on the lowest position, call lights within reach. Problem: Skin/Tissue Integrity  Goal: Absence of new skin breakdown  Description: 1. Monitor for areas of redness and/or skin breakdown  2. Assess vascular access sites hourly  3. Every 4-6 hours minimum:  Change oxygen saturation probe site  4. Every 4-6 hours:  If on nasal continuous positive airway pressure, respiratory therapy assess nares and determine need for appliance change or resting period. 12/12/2023 2149 by Rose Yadav RN  Outcome: Progressing    Turn patient from side to side as tolerated, boot on. Problem: Cardiovascular - Adult  Goal: Maintains optimal cardiac output and hemodynamic stability  Outcome: Progressing  BP within normal limits, urine output adequate. Will continue to monitor. Problem: Cardiovascular - Adult  Goal: Absence of cardiac dysrhythmias or at baseline  Outcome: Progressing    AV Phased on tele, no chest pain complaint. Will continue to monitor.

## 2023-12-13 NOTE — PROGRESS NOTES
Pt arrives from OR in bed. Nc at 3lpm.  Pt denies pain at this time. Called report to RN on 6 S.    Jimbo Keenan MD

## 2023-12-13 NOTE — CARE COORDINATION
Chart review day 2- pt from [facility] (previously [address]). Pt plans to return to SNF at dc. No precert needed. OR today for aspiration of left shoulder. Rule out pneumonia. Pt has history of dementia. Cm will follow for DCP needs.

## 2023-12-13 NOTE — PLAN OF CARE
Problem: Safety - Adult  Goal: Free from fall injury  12/13/2023 0753 by Kelly Valdez RN  Outcome: Progressing  Flowsheets (Taken 12/13/2023 9443)  Free From Fall Injury:   Instruct family/caregiver on patient safety   Based on caregiver fall risk screen, instruct family/caregiver to ask for assistance with transferring infant if caregiver noted to have fall risk factors  12/12/2023 2149 by Ole Goldmann, RN  Outcome: Progressing     Problem: Pain  Goal: Verbalizes/displays adequate comfort level or baseline comfort level  Outcome: Progressing  Flowsheets (Taken 12/13/2023 0753)  Verbalizes/displays adequate comfort level or baseline comfort level:   Encourage patient to monitor pain and request assistance   Assess pain using appropriate pain scale   Administer analgesics based on type and severity of pain and evaluate response   Implement non-pharmacological measures as appropriate and evaluate response   Consider cultural and social influences on pain and pain management   Notify Licensed Independent Practitioner if interventions unsuccessful or patient reports new pain

## 2023-12-13 NOTE — CONSULTS
Infectious Diseases Inpatient Consult Note    Reason for Consult:     Requesting Physician:   Dr Kaushal Potter  Primary Care Physician:  Cornell Velasquez MD  History Obtained From:   Pt, EPIC    Admit Date: 12/11/2023  Hospital Day: 2    CHIEF COMPLAINT:       Chief Complaint   Patient presents with    Shortness of Breath     Pt has low room air oxygen and does not wear oxygen at baseline. Hx if dementia       HISTORY OF PRESENT ILLNESS:      79 yo woman  PMH - dementia, sick sinus syn, pAF, CVA, HTN, CAD, HL, hypothyroid  PSurgHx - PPM  Lives a nursing facility    [address] [place] [address] Av 11/17-24/23 - hyponatremia, Streptococcal bactremia    Presents with SOB. In ED 12/11, afeb, WBC 10.3, UA neg  CXR b/l effusions, RLL density  CT no PE, L shoulder mass - poss abscess    Today 12/12 - afeb, 2L  Seen by Ortho - felt L shoulder fluid may be from severe arthritis, arthropathy        Past Medical History:    Past Medical History:   Diagnosis Date    Arthritis     CAD (coronary artery disease)     CTS (carpal tunnel syndrome)     DVT (deep venous thrombosis) ([address]) 01/01/1966    post partum    Hyperlipidemia     Hypertension     Hypothyroidism     Lumbar disc disease     Nonrheumatic aortic valve insufficiency     OAB (overactive bladder)     Pacemaker     Paroxysmal atrial fibrillation (HCC)     Psoriasis     SSS (sick sinus syndrome) (HCC)     Venous stasis        Past Surgical History:    Past Surgical History:   Procedure Laterality Date    APPENDECTOMY      BREAST SURGERY      lumpectomy - benign    CARPAL TUNNEL RELEASE Right 09/09/2014    COLONOSCOPY  05/04/2004    Sigmoid diverticulosis    COLONOSCOPY      FINGER TRIGGER RELEASE  04/10/2012    left middle finger, and left CTR    HERNIA REPAIR  01/01/1980    rt inguinal    JOINT REPLACEMENT  01/01/2011    THR right    JOINT REPLACEMENT      TKR left    PACEMAKER INSERTION  01/01/2011    [employer] pacer. Generator is safe. Leads are NOT SAFE.     THYROIDECTOMY, PARTIAL 0.7 2023    BUN 12 2023     (L) 2023    K 3.5 2023     2023    CO2 27 2023       Hepatic Function Panel:   Lab Results   Component Value Date/Time    ALKPHOS 137 2023 03:21 PM    ALT 19 2023 03:21 PM    AST 45 2023 03:21 PM    PROT 6.2 2023 03:21 PM    BILITOT 1.2 2023 03:21 PM    BILIDIR 0.3 2023 11:30 PM    IBILI 0.4 2023 11:30 PM    LABALBU 2.7 2023 03:21 PM       Micro:   BC x 2 Streptococcus gordonii (S amp <025, CTX <0.12)   BC no growth     SARS CoV-2, Influenza A/B PCR neg   BC sent    Resp PCR panel neg   Pneumococcal / Legionella urinary ag sent     Imagin/11 --  CTA Chest W/ Contrast R/O PE   Final Result   1. Evaluation of peripheral vasculature is degraded by respiratory motion. No evidence of central or proximal segmental pulmonary emboli. 2.  Cardiomegaly with moderate right and small left pleural effusion with likely pulmonary edema. 3.  Hypodense enlargement of the left shoulder anterior musculature with small foci of air concerning for soft tissue infection/abscess. XR CHEST (2 VW)   Final Result   Combined airspace consolidation and pleural fluid right lower lung suggestive   for pneumonia      L shoulder CT  1. Large amount of fluid distending the subacromial-subdeltoid bursa and   subcoracoid bursa, containing gas and synovial enhancement compatible with   septic bursitis. Intramuscular fluid/abscess insinuating medially within the   supraspinatus. Because the superior cuff appears deficient this may implicate   communicating septic arthritis as well. 2.  No CT findings for osteomyelitis at this time. 3.  Question of a small fluid collection/abscess in the biceps muscle at the mid   arm. Correlate for fluctuance at this location.          IMPRESSION:      Patient Active Problem List   Diagnosis    Trigger finger, acquired    Carpal tunnel

## 2023-12-14 ENCOUNTER — APPOINTMENT (OUTPATIENT)
Dept: ULTRASOUND IMAGING | Age: 88
DRG: 463 | End: 2023-12-14
Payer: MEDICARE

## 2023-12-14 PROBLEM — L02.419 SHOULDER ABSCESS: Status: ACTIVE | Noted: 2023-12-14

## 2023-12-14 LAB
ACID FAST STN SPEC QL: NORMAL
ACID FAST STN SPEC QL: NORMAL
ALBUMIN SERPL-MCNC: 2.8 G/DL (ref 3.4–5)
ALBUMIN/GLOB SERPL: 0.9 {RATIO} (ref 1.1–2.2)
ALP SERPL-CCNC: 115 U/L (ref 40–129)
ALT SERPL-CCNC: 12 U/L (ref 10–40)
ANION GAP SERPL CALCULATED.3IONS-SCNC: 12 MMOL/L (ref 3–16)
AST SERPL-CCNC: 21 U/L (ref 15–37)
BASOPHILS # BLD: 0 K/UL (ref 0–0.2)
BASOPHILS NFR BLD: 0.2 %
BILIRUB SERPL-MCNC: 0.8 MG/DL (ref 0–1)
BUN SERPL-MCNC: 18 MG/DL (ref 7–20)
CALCIUM SERPL-MCNC: 8.6 MG/DL (ref 8.3–10.6)
CHLORIDE SERPL-SCNC: 100 MMOL/L (ref 99–110)
CO2 SERPL-SCNC: 25 MMOL/L (ref 21–32)
CREAT SERPL-MCNC: 0.8 MG/DL (ref 0.6–1.2)
DEPRECATED RDW RBC AUTO: 19.9 % (ref 12.4–15.4)
EOSINOPHIL # BLD: 0 K/UL (ref 0–0.6)
EOSINOPHIL NFR BLD: 0 %
GFR SERPLBLD CREATININE-BSD FMLA CKD-EPI: >60 ML/MIN/{1.73_M2}
GLUCOSE SERPL-MCNC: 99 MG/DL (ref 70–99)
HCT VFR BLD AUTO: 29.1 % (ref 36–48)
HGB BLD-MCNC: 9.1 G/DL (ref 12–16)
LOEFFLER MB STN SPEC: NORMAL
LYMPHOCYTES # BLD: 0.5 K/UL (ref 1–5.1)
LYMPHOCYTES NFR BLD: 8.7 %
MAGNESIUM SERPL-MCNC: 2 MG/DL (ref 1.8–2.4)
MCH RBC QN AUTO: 26.9 PG (ref 26–34)
MCHC RBC AUTO-ENTMCNC: 31.3 G/DL (ref 31–36)
MCV RBC AUTO: 86 FL (ref 80–100)
MONOCYTES # BLD: 0.2 K/UL (ref 0–1.3)
MONOCYTES NFR BLD: 3.2 %
NEUTROPHILS # BLD: 5.6 K/UL (ref 1.7–7.7)
NEUTROPHILS NFR BLD: 87.9 %
PLATELET # BLD AUTO: 311 K/UL (ref 135–450)
PMV BLD AUTO: 8 FL (ref 5–10.5)
POTASSIUM SERPL-SCNC: 3.9 MMOL/L (ref 3.5–5.1)
PROT SERPL-MCNC: 6 G/DL (ref 6.4–8.2)
RBC # BLD AUTO: 3.38 M/UL (ref 4–5.2)
SODIUM SERPL-SCNC: 137 MMOL/L (ref 136–145)
VANCOMYCIN SERPL-MCNC: 20.8 UG/ML
WBC # BLD AUTO: 6.3 K/UL (ref 4–11)

## 2023-12-14 PROCEDURE — 2580000003 HC RX 258: Performed by: INTERNAL MEDICINE

## 2023-12-14 PROCEDURE — 6360000002 HC RX W HCPCS: Performed by: INTERNAL MEDICINE

## 2023-12-14 PROCEDURE — 36415 COLL VENOUS BLD VENIPUNCTURE: CPT

## 2023-12-14 PROCEDURE — 93306 TTE W/DOPPLER COMPLETE: CPT

## 2023-12-14 PROCEDURE — 99233 SBSQ HOSP IP/OBS HIGH 50: CPT | Performed by: INTERNAL MEDICINE

## 2023-12-14 PROCEDURE — 99232 SBSQ HOSP IP/OBS MODERATE 35: CPT | Performed by: INTERNAL MEDICINE

## 2023-12-14 PROCEDURE — 6370000000 HC RX 637 (ALT 250 FOR IP): Performed by: PHYSICIAN ASSISTANT

## 2023-12-14 PROCEDURE — 1200000000 HC SEMI PRIVATE

## 2023-12-14 PROCEDURE — 83735 ASSAY OF MAGNESIUM: CPT

## 2023-12-14 PROCEDURE — 2580000003 HC RX 258: Performed by: PHYSICIAN ASSISTANT

## 2023-12-14 PROCEDURE — 85025 COMPLETE CBC W/AUTO DIFF WBC: CPT

## 2023-12-14 PROCEDURE — 97530 THERAPEUTIC ACTIVITIES: CPT

## 2023-12-14 PROCEDURE — 97166 OT EVAL MOD COMPLEX 45 MIN: CPT

## 2023-12-14 PROCEDURE — 97162 PT EVAL MOD COMPLEX 30 MIN: CPT

## 2023-12-14 PROCEDURE — 97535 SELF CARE MNGMENT TRAINING: CPT

## 2023-12-14 PROCEDURE — 80053 COMPREHEN METABOLIC PANEL: CPT

## 2023-12-14 PROCEDURE — 80202 ASSAY OF VANCOMYCIN: CPT

## 2023-12-14 RX ORDER — FUROSEMIDE 10 MG/ML
20 INJECTION INTRAMUSCULAR; INTRAVENOUS ONCE
Status: COMPLETED | OUTPATIENT
Start: 2023-12-14 | End: 2023-12-14

## 2023-12-14 RX ADMIN — ACETAMINOPHEN 650 MG: 325 TABLET ORAL at 18:43

## 2023-12-14 RX ADMIN — METOPROLOL SUCCINATE 12.5 MG: 25 TABLET, FILM COATED, EXTENDED RELEASE ORAL at 10:06

## 2023-12-14 RX ADMIN — LEVOTHYROXINE SODIUM 50 MCG: 50 TABLET ORAL at 06:41

## 2023-12-14 RX ADMIN — SODIUM CHLORIDE, PRESERVATIVE FREE 10 ML: 5 INJECTION INTRAVENOUS at 20:22

## 2023-12-14 RX ADMIN — VANCOMYCIN HYDROCHLORIDE 1250 MG: 10 INJECTION, POWDER, LYOPHILIZED, FOR SOLUTION INTRAVENOUS at 00:25

## 2023-12-14 RX ADMIN — APIXABAN 2.5 MG: 2.5 TABLET, FILM COATED ORAL at 20:21

## 2023-12-14 RX ADMIN — CEFEPIME 2000 MG: 2 INJECTION, POWDER, FOR SOLUTION INTRAVENOUS at 20:29

## 2023-12-14 RX ADMIN — SODIUM CHLORIDE, POTASSIUM CHLORIDE, SODIUM LACTATE AND CALCIUM CHLORIDE: 600; 310; 30; 20 INJECTION, SOLUTION INTRAVENOUS at 07:01

## 2023-12-14 RX ADMIN — ACETAMINOPHEN 650 MG: 325 TABLET ORAL at 00:25

## 2023-12-14 RX ADMIN — ACETAMINOPHEN 650 MG: 325 TABLET ORAL at 13:52

## 2023-12-14 RX ADMIN — FUROSEMIDE 20 MG: 10 INJECTION, SOLUTION INTRAMUSCULAR; INTRAVENOUS at 18:50

## 2023-12-14 RX ADMIN — DONEPEZIL HYDROCHLORIDE 5 MG: 5 TABLET, FILM COATED ORAL at 00:22

## 2023-12-14 RX ADMIN — VANCOMYCIN HYDROCHLORIDE 1000 MG: 10 INJECTION, POWDER, LYOPHILIZED, FOR SOLUTION INTRAVENOUS at 18:52

## 2023-12-14 RX ADMIN — ATORVASTATIN CALCIUM 40 MG: 40 TABLET, FILM COATED ORAL at 00:22

## 2023-12-14 RX ADMIN — DONEPEZIL HYDROCHLORIDE 5 MG: 5 TABLET, FILM COATED ORAL at 20:22

## 2023-12-14 RX ADMIN — CEFEPIME 2000 MG: 2 INJECTION, POWDER, FOR SOLUTION INTRAVENOUS at 10:07

## 2023-12-14 RX ADMIN — ATORVASTATIN CALCIUM 40 MG: 40 TABLET, FILM COATED ORAL at 20:21

## 2023-12-14 RX ADMIN — APIXABAN 2.5 MG: 2.5 TABLET, FILM COATED ORAL at 10:06

## 2023-12-14 ASSESSMENT — PAIN SCALES - GENERAL
PAINLEVEL_OUTOF10: 0

## 2023-12-14 NOTE — CONSULTS
Clinical Pharmacy Progress Note    Vancomycin - Management by Pharmacy    Consult Date(s): 12/13/23  Consulting Provider(s): Dr Jose Alfredo Alex / Plan    L shoulder septic arthritis/ extra-articular abscess: Vancomycin  Concurrent Antimicrobials: Cefepime  Day of Vanc Therapy / Ordered Duration: Day 3 of 21  (re-consult 12/13)  Current Dosing Method: Bayesian-Guided AUC Dosing  Therapeutic Goal: -600 mg/L*hr  Current Dose / Plan:   Renal function stable (SCr 0.7)   Previously on 1250 mg IV q 24h after 1750 mg load given 12/11  Last dose (2nd overall dose) given ~ 24h ago- will resume prior dosing of 1250 mg IV q24h  Predicted AUC ~ 518 mg/L*hr and steady-state trough ~ 13.4 mg/L on this regimen  Random level ordered for tomorrow am to assess kinetic estimates  Will continue to monitor clinical condition and make adjustments to regimen as appropriate. Thank you for consulting pharmacy,    Please call with any questions    Bob MONTEZ [facility]  [phone] (Main Pharmacy)         Interval update:  s/p IR aspirate of L shoulder this am, followed by L shoulder arthrotomy with I&D today- samples sent for culture. Noted to have extra-articular abscess (deep to clavipectoral fascia). Restarting antibiotics per ID     Subjective/Objective:   Andrez Vicente is a 80 y.o. female with a PMHx significant for CAD, HTN, HLD, dementia, SSS s/p pacemaker, CVA, hypothyroidism, A.fib, OAB, venous stasis, and arthritis who is admitted with SOB - being treated for RLL pneumonia and ACS r/o. Pt also with left shoulder pain / swelling. Pharmacy is consulted to dose Vancomycin.          Ht Readings from Last 1 Encounters:   12/11/23 1.651 m (5' 5\")     Wt Readings from Last 1 Encounters:   12/13/23 70 kg (154 lb 4.8 oz)     Current & Prior Antimicrobial Regimen(s):  Cefepime (12/11-current)  Cefazolin x1 (in OR 12/13)  Vancomycin   1250 mg IV q24h (12/13--current)    Vancomycin Level(s) / Doses:    Date Time Dose

## 2023-12-14 NOTE — PROGRESS NOTES
Internal Medicine Progress Note    Date: 12/14/2023   Patient: [address] Day: 3      CC: Shortness of Breath (Pt has low room air oxygen and does not wear oxygen at baseline. Hx if dementia)       Interval Hx     Update: Ortho I&D yesterday, IV Abx recommended   Echo EF > 64%, systolic pulm artery pressure 56 mm Hg     Ortho: L shoulder arthrotomy, excisional I&D. Activity as tolerated, sling for comfort for only 3-7d. Active use of arm if/when pain allows. Long-term IV abx w/PICC line needed. Drain management - keep in place till output less than 30 per day. ID: restart vanc/cef, follow up on IR/OR culture, FU blood cx   - Legionella & Strep Ag neg. Blood cx collected 12/12 NGTD x 2d. MRSA neg. Resp pathogens panel neg. COVID & Flu neg. Pending: acid fast bacillus cx, fungus cx, anaerobic & aerobic cx    VTLs stable (BP 110s-120s/50s-90s)  O2 2.5 L, asked nurse to try weaning to 1 L   Patient unchanged from yesterday   FX unchanged     HPI: Sung Padgett is a 80 y.o. female with history significant of dementia, sick sinus syndrome (bipolar pacemaker), Paroxysmal A-fib on Eliquis, previous CVA, and recent hospital admission in Nov 2023 for AMS due to Hyponatremia and Streptococcus bacteremia with inpatient treatment with CTX, repeat blood cx neg x 2, and discharged w/ampicillin. She presents today from her nursing home due to nursing ome \"unable to get oxygen reading on her,\" chest pain today, and abdominal pain yesterday. On my evaluation, she is desatting to 85 without oxygen and satting at 97 and above with 3 L O2. She is alert and oriented to self. She thinks the year is 46 and that she is in [place]. When I spoke to her family, they informed me that she has dementia and some days are better than others - she does sometimes not get the year/date right. While I was in the room for approximately 10 minutes, she did not have cough.  Without supplemental oxygen, she has mild ALKPHOS 137*     U/A:   Recent Labs     12/12/23  0311   COLORU Yellow   PHUR 6.5   WBCUA 0-2   RBCUA 0-2   BACTERIA Rare*   CLARITYU Clear   SPECGRAV 1.010   LEUKOCYTESUR Negative   UROBILINOGEN 0.2   BILIRUBINUR Negative   BLOODU TRACE-INTACT*   GLUCOSEU Negative       Radiology:  US ASP ABSCESS/HEMATOMA/BULLA/CYST   Final Result   Technically successful CT-guided left shoulder aspiration. CT SHOULDER LEFT W CONTRAST   Final Result      1. Large amount of fluid distending the subacromial-subdeltoid bursa and   subcoracoid bursa, containing gas and synovial enhancement compatible with   septic bursitis. Intramuscular fluid/abscess insinuating medially within the   supraspinatus. Because the superior cuff appears deficient this may implicate   communicating septic arthritis as well. 2.  No CT findings for osteomyelitis at this time. 3.  Question of a small fluid collection/abscess in the biceps muscle at the mid   arm. Correlate for fluctuance at this location. CTA Chest W/ Contrast R/O PE   Final Result      1. Evaluation of peripheral vasculature is degraded by respiratory motion. No evidence of central or proximal segmental pulmonary emboli. 2.  Cardiomegaly with moderate right and small left pleural effusion with likely pulmonary edema. 3.  Hypodense enlargement of the left shoulder anterior musculature with small foci of air concerning for soft tissue infection/abscess. Electronically signed by La Gracia MD      XR CHEST (2 VW)   Final Result   Combined airspace consolidation and pleural fluid right lower lung suggestive   for pneumonia      IR FLUORO GUIDED NEEDLE PLACEMENT    (Results Pending)   US ASP ABSCESS/HEMATOMA/BULLA/CYST    (Results Pending)     Assessment & Plan   Jovanni Martinez is a 80 y.o. female who presents from her nursing home due to \"inability to get oxygen reading. \" She presents w/elevated Troponin, QRS widening, & CP earlier today.       Her hx is significant for dementia, sick sinus syndrome (bipolar pacemaker), Paroxysmal A-fib on Eliquis, previous CVA, and recent hospital admission in Nov 2023 for AMS due to Hyponatremia and Streptococcus bacteremia with inpatient treatment with CTX, repeat blood cx neg x 2, and discharged w/ampicillin. Septic Bursitis s/p aspiration and I&D  - CT L shoulder: subacromial-subdeltoid and subcoracoid bursa containing gas & synovial enhancement. Afebrile, WBC 11s, no physical exam findings  - Gen surg c/s - L shoulder US ordered, orthopedic eval  - Ortho c/s: aspiration on 12/13 w/1 cc thick white and bloody fluid. I&D on 12/13.   - ID c/s: D/c vanc/cef 12/12 (received for 2d). Restarted vanc/cef after I&D on 12/13. Hypoxia  - Ddx: ACS rule out, HF exacerbation, RLL pneumonia  - Echo: NL LV size & wall thickness, EF > 60%, LA mild to moderate dilation, pulm arterial HTN  - 1x Furosemide 12/12    ACS rule out   - Trop 112 > 118 > 112 (no ESRD or JHON), QRS 0.17, Hypoxic, reported CP before admission (confirmed by daughter)   - Heparin gtt discontinued after Troponins stable. Cardio c/s: likely due to demand ischemia. No lactic acidosis. Rule out RLL Pneumonia   - Pw SpO2 85, CXR: RLL consolidation & pleural fluid. CTA neg for PE, cardiomegaly w/mod R & small L pleural effusion likely pulm edema, possible L shoulder anterior musculature soft tissue infection/abscess. No clinical symptoms indicative of lower respiratory tract infection  - Given her recent hospitalization and blood cultures resulting in streptococcus, vanc/cef started 12/11. ID c/s: discontinued vanc/cef 12/12, restarted vanc cef after L shoulder I&D on 12/13.   - COVID neg, Procal NL. Resp pathogen neg. Blood cx x 2 NGTD.  Strep Ag, legionella Ag, Sputum culture pending     Chronic Medical Conditions  - Dementia - continue home Donepezil 5 mg QHS   - Hypothyroidism - continue home Levothyroxine 50 mcg daily  - Paroxysmal A-fib - continue Metoprolol succinate 12.5 mg

## 2023-12-14 NOTE — CARE COORDINATION
Discharge plan to go back to [facility]. No precert needed. Patient medical team plan for PICC line and IV abx at discharge.  Electronically signed by Eleonora Benjamin RN on 12/14/2023 at 3:13 PM

## 2023-12-14 NOTE — PROGRESS NOTES
Occupational Therapy  Facility/Department: [facility] [address] [place] [address]  Occupational Therapy Initial Assessment/Treatment    Name: Haydee Jackson  : 1932  MRN: [id number]  Date of Service: 2023    Discharge Recommendations:  [address], Patient would benefit from continued therapy after discharge  OT Equipment Recommendations  Equipment Needed: No  Other: defer     Patient Diagnosis(es): The primary encounter diagnosis was Acute respiratory failure with hypoxia ([address]). Diagnoses of Pneumonia of right lower lobe due to infectious organism, Acute congestive heart failure, unspecified heart failure type ([address]), and Pyogenic arthritis of left shoulder region, due to unspecified organism [facility]) were also pertinent to this visit. Past Medical History:  has a past medical history of Arthritis, CAD (coronary artery disease), CTS (carpal tunnel syndrome), DVT (deep venous thrombosis) ([address]), Hyperlipidemia, Hypertension, Hypothyroidism, Lumbar disc disease, Nonrheumatic aortic valve insufficiency, OAB (overactive bladder), Pacemaker, Paroxysmal atrial fibrillation ([address]), Psoriasis, SSS (sick sinus syndrome) ([address]), and Venous stasis. Past Surgical History:  has a past surgical history that includes Appendectomy; Thyroidectomy, partial; Breast surgery; Pacemaker insertion (2011); Finger trigger release (04/10/2012); joint replacement (2011); joint replacement; Carpal tunnel release (Right, 2014); Colonoscopy (2004); hernia repair (1980); Colonoscopy; US ASP ABSCESS/HEMATOMA/BULLA/CYST (2023); and shoulder surgery (Left, 2023). Treatment Diagnosis: decreased independence with ADLs and fx mobility    Assessment   Performance deficits / Impairments: Decreased functional mobility ; Decreased endurance;Decreased ADL status; Decreased strength  Assessment: Pt required min Ax2 for sit>stand transfer this date and mod Ax2 for stand pivot transfer from bed to musculature abscess -  s/p left shoulder aspiration,  s/p left shoulder I&D and arthrotomy for extra-articular abscess; recent admission 2023 related to altered mental status, hyponatremia, strepococcus bactermia; PMH: dementia, sick sinus syndrome (bipolar pacemaker), paroxysmal Afib, previous CVA  Family / Caregiver Present: Yes (son)  Referring Practitioner: Laquita Hernandez MD  Diagnosis: Pneumonia due to infectious organism; post op L shoulder aspiration, I&D  Subjective  Subjective: Pt in bed upon OT arrival and agreeable to OT evaluation. Pt very pleasantly confused throughout session. Pts son at beginning and end of session. General Comment  Comments: no c/o pain, no pain distress noted during session     Social/Functional History  Social/Functional History  Lives With:  (alone - spouse )  Type of Home: Assisted living (most recently in SNF)  Home Layout: One level  Bathroom Shower/Tub: Walk-in shower  Bathroom Toilet: Handicap height  Bathroom Equipment: Grab bars in shower  Home Equipment: Esthela Chase, [address], Wheelchair-manual  Has the patient had two or more falls in the past year or any fall with injury in the past year?: No (slipped off bed in August with CVA)  Receives Help From:  (staff at SNF vs [facility])  ADL Assistance: Needs assistance (required (A) for all bathing/dressing)  Homemaking Assistance: Needs assistance  Ambulation Assistance: Needs assistance (minimal gait since CVA in August, up to 60-70ft with RW, w/c follow;)  Transfer Assistance: Needs assistance  Active : No  Patient's  Info: family transports if needed  Additional Comments: Since August (CVA), been in SNF > [facility] - frequent hospital admissions. Son present during evaluation. Patient is a poor historian.        Objective   Pulse: 66  Heart Rate Source: Monitor  BP: 114/84  BP Location: Right upper arm  BP Method: Automatic  Patient Position: Semi fowlers  MAP (Calculated): 94  Respirations:

## 2023-12-14 NOTE — PROGRESS NOTES
Clinical Pharmacy Progress Note    Vancomycin - Management by Pharmacy    Consult Date(s): 12/13/23  Consulting Provider(s): Dr Sanjay Saha / Plan    L shoulder septic arthritis/ extra-articular abscess: Vancomycin  Concurrent Antimicrobials:   Cefepime  Day of Vanc Therapy / Ordered Duration: #4 of 21  Current Dosing Method: Bayesian-Guided AUC Dosing  Therapeutic Goal: -600 mg/L*hr  Current Dose / Plan:   Renal function remains stable; SCr 0.8  Currently on 1250mg IV q24h. Level today = 20.8 mg/L - drawn ~9h after prior dose  Calculated AUC is 595 mg/L*h with trough of 16.4 mg/L  AUC is within goal range but at upper end of target range and some concern for accumulation with continued dosing. Will decrease dose to 1000mg IV q24h. Regimen predicts AUC of 489 mg/L*h  Plan to repeat level in ~48h to reassess kinetic estimates  Will continue to monitor clinical condition and make adjustments to regimen as appropriate. Please call with questions--  Sherrod Siemens, PharmD, BCPS  Wireless: [id number]   12/14/2023 11:41 AM       Interval update:  S/p L shoulder arthrotomy w/ I&D 12/13. ABx were restarted post-op by ID. Subjective/Objective:   Lenora Royal is a 80 y.o. female with a PMHx significant for CAD, HTN, HLD, dementia, SSS s/p pacemaker, CVA, hypothyroidism, A.fib, OAB, venous stasis, and arthritis who is admitted with SOB - being treated for RLL pneumonia and ACS r/o. Pt also with left shoulder pain / swelling. Pharmacy is consulted to dose Vancomycin.     Ht Readings from Last 1 Encounters:   12/11/23 1.651 m (5' 5\")     Wt Readings from Last 1 Encounters:   12/13/23 70 kg (154 lb 4.8 oz)     Current & Prior Antimicrobial Regimen(s):  Cefepime (12/11-current)  Vancomycin - Pharmacy to dose  1250 mg IV q24h (12/13--current)    Vancomycin Level(s) / Doses:    Date Time Dose Type of Level / Level Interpretation   12/14 0915 1250mg IV q24h Random = 20.8 mg/L Level drawn ~9h after prior dose  Calculated  mg/L*h, trough 16.4 mg/L  Decrease dose to 1000mg IV q24h          Note: Serum levels collected for AUC-based dosing may be high if collected in close proximity to the dose administered. This is not necessarily indicative of toxicity. Cultures & Sensitivities:    Date Site Micro Susceptibility / Result   12/11 COVID-19  Influenza A/B Not detected  Not detected     12/11 MRSA nasal PCR Not detected     12/11 Blood x2 NGTD     12/11 Legionella antigen negative     12/11 Strep pneumo antigen negative     12/11 Sputum  cx sent     12/12 Respiratory PCR No pathogens detected      12/13  Shoulder joint sent           Recent Labs     12/12/23  0834 12/13/23  1012 12/14/23  0915   CREATININE 0.7 0.7 0.8   BUN 12 16 18   WBC 8.3 5.3 6.3       Estimated Creatinine Clearance: 45 mL/min (based on SCr of 0.8 mg/dL).     Additional Lab Values / Findings of Note:    Recent Labs     12/11/23  1948   PROCAL 0.10

## 2023-12-14 NOTE — ANESTHESIA POSTPROCEDURE EVALUATION
Department of Anesthesiology  Postprocedure Note    Patient: Brooklyn Ayon  MRN: [id number]  YOB: 1932  Date of evaluation: 12/13/2023    Procedure Summary       Date: 12/13/23 Room / Location: [place] [address] / [address]    Anesthesia Start: 1536 Anesthesia Stop: 3126    Procedure: INCISION AND DRAINAGE LEFT SHOULDER (Left: Shoulder) Diagnosis:       Pyogenic arthritis of left shoulder region, due to unspecified organism (HCC)      (Pyogenic arthritis of left shoulder region, due to unspecified organism [facility]) [M00.9])    Surgeons: Marshall Jacobo MD Responsible Provider: Saritha Ryder MD    Anesthesia Type: general ASA Status: 3            Anesthesia Type: No value filed. Kathryn Phase I: Kathryn Score: 10    Kathryn Phase II:      Anesthesia Post Evaluation    Patient location during evaluation: PACU  Patient participation: complete - patient participated  Level of consciousness: awake and alert  Pain score: 0  Airway patency: patent  Nausea & Vomiting: no nausea and no vomiting  Cardiovascular status: hemodynamically stable  Respiratory status: acceptable  Hydration status: euvolemic  Pain management: adequate    No notable events documented.

## 2023-12-14 NOTE — PROGRESS NOTES
Physical Therapy  Facility/Department: [address] [place] [address]  Physical Therapy Initial Assessment    Name: Lenora Royal  : 1932  MRN: [id number]  Date of Service: 2023    Discharge Recommendations:  Lenora Royal scored a 9/24 on the AM-PAC short mobility form. Current research shows that an AM-PAC score of 17 or less is typically not associated with a discharge to the patient's home setting. Based on the patient's AM-PAC score and their current functional mobility deficits, it is recommended that the patient have 3-5 sessions per week of Physical Therapy at d/c to increase the patient's independence. Please see assessment section for further patient specific details. If patient discharges prior to next session this note will serve as a discharge summary. Please see below for the latest assessment towards goals. Subacute/Skilled Nursing Facility   PT Equipment Recommendations  Other: plan to continue to assess and likely defer recommendations to the next level of care      Patient Diagnosis(es): The primary encounter diagnosis was Acute respiratory failure with hypoxia ([address]). Diagnoses of Pneumonia of right lower lobe due to infectious organism, Acute congestive heart failure, unspecified heart failure type ([address]), and Pyogenic arthritis of left shoulder region, due to unspecified organism [facility]) were also pertinent to this visit. Past Medical History:  has a past medical history of Arthritis, CAD (coronary artery disease), CTS (carpal tunnel syndrome), DVT (deep venous thrombosis) ([address]), Hyperlipidemia, Hypertension, Hypothyroidism, Lumbar disc disease, Nonrheumatic aortic valve insufficiency, OAB (overactive bladder), Pacemaker, Paroxysmal atrial fibrillation ([address]), Psoriasis, SSS (sick sinus syndrome) ([address]), and Venous stasis. Past Surgical History:  has a past surgical history that includes Appendectomy; Thyroidectomy, partial; Breast surgery; Pacemaker insertion (2011);  Finger

## 2023-12-14 NOTE — PLAN OF CARE
Problem: Safety - Adult  Goal: Free from fall injury  Outcome: Progressing  Flowsheets (Taken 12/14/2023 0740)  Free From Fall Injury:   Instruct family/caregiver on patient safety   Based on caregiver fall risk screen, instruct family/caregiver to ask for assistance with transferring infant if caregiver noted to have fall risk factors     Problem: Pain  Goal: Verbalizes/displays adequate comfort level or baseline comfort level  Outcome: Progressing  Flowsheets (Taken 12/13/2023 0753)  Verbalizes/displays adequate comfort level or baseline comfort level:   Encourage patient to monitor pain and request assistance   Assess pain using appropriate pain scale   Administer analgesics based on type and severity of pain and evaluate response   Implement non-pharmacological measures as appropriate and evaluate response   Consider cultural and social influences on pain and pain management   Notify Licensed Independent Practitioner if interventions unsuccessful or patient reports new pain

## 2023-12-15 VITALS
TEMPERATURE: 97 F | BODY MASS INDEX: 24.65 KG/M2 | HEIGHT: 65 IN | WEIGHT: 147.93 LBS | OXYGEN SATURATION: 97 % | SYSTOLIC BLOOD PRESSURE: 118 MMHG | RESPIRATION RATE: 18 BRPM | HEART RATE: 63 BPM | DIASTOLIC BLOOD PRESSURE: 70 MMHG

## 2023-12-15 LAB
ACID FAST STN SPEC QL: NORMAL
ACID FAST STN SPEC QL: NORMAL
ANION GAP SERPL CALCULATED.3IONS-SCNC: 9 MMOL/L (ref 3–16)
BASOPHILS # BLD: 0.1 K/UL (ref 0–0.2)
BASOPHILS NFR BLD: 1 %
BUN SERPL-MCNC: 23 MG/DL (ref 7–20)
CALCIUM SERPL-MCNC: 8.8 MG/DL (ref 8.3–10.6)
CHLORIDE SERPL-SCNC: 102 MMOL/L (ref 99–110)
CO2 SERPL-SCNC: 26 MMOL/L (ref 21–32)
CREAT SERPL-MCNC: 0.9 MG/DL (ref 0.6–1.2)
DEPRECATED RDW RBC AUTO: 19.3 % (ref 12.4–15.4)
EOSINOPHIL # BLD: 0.1 K/UL (ref 0–0.6)
EOSINOPHIL NFR BLD: 1.7 %
FERRITIN SERPL IA-MCNC: 166.3 NG/ML (ref 15–150)
GFR SERPLBLD CREATININE-BSD FMLA CKD-EPI: >60 ML/MIN/{1.73_M2}
GLUCOSE SERPL-MCNC: 105 MG/DL (ref 70–99)
HCT VFR BLD AUTO: 29.8 % (ref 36–48)
HGB BLD-MCNC: 9.4 G/DL (ref 12–16)
IRON SATN MFR SERPL: 12 % (ref 15–50)
IRON SERPL-MCNC: 30 UG/DL (ref 37–145)
LOEFFLER MB STN SPEC: NORMAL
LYMPHOCYTES # BLD: 1.2 K/UL (ref 1–5.1)
LYMPHOCYTES NFR BLD: 18 %
MAGNESIUM SERPL-MCNC: 2 MG/DL (ref 1.8–2.4)
MCH RBC QN AUTO: 26.8 PG (ref 26–34)
MCHC RBC AUTO-ENTMCNC: 31.5 G/DL (ref 31–36)
MCV RBC AUTO: 85.1 FL (ref 80–100)
MONOCYTES # BLD: 0.5 K/UL (ref 0–1.3)
MONOCYTES NFR BLD: 7.9 %
NEUTROPHILS # BLD: 4.9 K/UL (ref 1.7–7.7)
NEUTROPHILS NFR BLD: 71.4 %
PLATELET # BLD AUTO: 334 K/UL (ref 135–450)
PMV BLD AUTO: 8.4 FL (ref 5–10.5)
POTASSIUM SERPL-SCNC: 4 MMOL/L (ref 3.5–5.1)
RBC # BLD AUTO: 3.51 M/UL (ref 4–5.2)
SODIUM SERPL-SCNC: 137 MMOL/L (ref 136–145)
TIBC SERPL-MCNC: 245 UG/DL (ref 260–445)
WBC # BLD AUTO: 6.9 K/UL (ref 4–11)

## 2023-12-15 PROCEDURE — 2580000003 HC RX 258: Performed by: INTERNAL MEDICINE

## 2023-12-15 PROCEDURE — 6370000000 HC RX 637 (ALT 250 FOR IP): Performed by: PHYSICIAN ASSISTANT

## 2023-12-15 PROCEDURE — 80048 BASIC METABOLIC PNL TOTAL CA: CPT

## 2023-12-15 PROCEDURE — 83735 ASSAY OF MAGNESIUM: CPT

## 2023-12-15 PROCEDURE — 2580000003 HC RX 258: Performed by: PHYSICIAN ASSISTANT

## 2023-12-15 PROCEDURE — 99024 POSTOP FOLLOW-UP VISIT: CPT | Performed by: PHYSICIAN ASSISTANT

## 2023-12-15 PROCEDURE — 6360000002 HC RX W HCPCS: Performed by: INTERNAL MEDICINE

## 2023-12-15 PROCEDURE — 85025 COMPLETE CBC W/AUTO DIFF WBC: CPT

## 2023-12-15 PROCEDURE — 83540 ASSAY OF IRON: CPT

## 2023-12-15 PROCEDURE — 82728 ASSAY OF FERRITIN: CPT

## 2023-12-15 PROCEDURE — APPNB45 APP NON BILLABLE 31-45 MINUTES: Performed by: PHYSICIAN ASSISTANT

## 2023-12-15 PROCEDURE — 83550 IRON BINDING TEST: CPT

## 2023-12-15 PROCEDURE — 99232 SBSQ HOSP IP/OBS MODERATE 35: CPT | Performed by: INTERNAL MEDICINE

## 2023-12-15 PROCEDURE — 36415 COLL VENOUS BLD VENIPUNCTURE: CPT

## 2023-12-15 RX ORDER — LINEZOLID 600 MG/1
600 TABLET, FILM COATED ORAL 2 TIMES DAILY
Qty: 42 TABLET | Refills: 0
Start: 2023-12-15 | End: 2024-01-05

## 2023-12-15 RX ORDER — LINEZOLID 600 MG/1
600 TABLET, FILM COATED ORAL 2 TIMES DAILY
Qty: 42 TABLET | Refills: 0 | Status: SHIPPED | OUTPATIENT
Start: 2023-12-15 | End: 2024-01-05

## 2023-12-15 RX ORDER — TORSEMIDE 10 MG/1
10 TABLET ORAL PRN
Qty: 20 TABLET | Refills: 0 | Status: SHIPPED | OUTPATIENT
Start: 2023-12-15

## 2023-12-15 RX ORDER — OXYCODONE HYDROCHLORIDE 5 MG/1
5 TABLET ORAL EVERY 6 HOURS PRN
Qty: 20 TABLET | Refills: 0 | Status: SHIPPED | OUTPATIENT
Start: 2023-12-15 | End: 2023-12-20

## 2023-12-15 RX ADMIN — ACETAMINOPHEN 650 MG: 325 TABLET ORAL at 06:18

## 2023-12-15 RX ADMIN — APIXABAN 2.5 MG: 2.5 TABLET, FILM COATED ORAL at 08:24

## 2023-12-15 RX ADMIN — CEFEPIME 2000 MG: 2 INJECTION, POWDER, FOR SOLUTION INTRAVENOUS at 08:28

## 2023-12-15 RX ADMIN — LEVOTHYROXINE SODIUM 50 MCG: 50 TABLET ORAL at 06:18

## 2023-12-15 RX ADMIN — SODIUM CHLORIDE, PRESERVATIVE FREE 10 ML: 5 INJECTION INTRAVENOUS at 08:25

## 2023-12-15 RX ADMIN — ACETAMINOPHEN 650 MG: 325 TABLET ORAL at 00:48

## 2023-12-15 RX ADMIN — SODIUM CHLORIDE, POTASSIUM CHLORIDE, SODIUM LACTATE AND CALCIUM CHLORIDE: 600; 310; 30; 20 INJECTION, SOLUTION INTRAVENOUS at 08:29

## 2023-12-15 ASSESSMENT — PAIN - FUNCTIONAL ASSESSMENT: PAIN_FUNCTIONAL_ASSESSMENT: PREVENTS OR INTERFERES WITH ALL ACTIVE AND SOME PASSIVE ACTIVITIES

## 2023-12-15 ASSESSMENT — PAIN SCALES - GENERAL
PAINLEVEL_OUTOF10: 2
PAINLEVEL_OUTOF10: 0
PAINLEVEL_OUTOF10: 0

## 2023-12-15 ASSESSMENT — PAIN DESCRIPTION - ORIENTATION: ORIENTATION: LEFT

## 2023-12-15 ASSESSMENT — PAIN DESCRIPTION - DESCRIPTORS: DESCRIPTORS: PATIENT UNABLE TO DESCRIBE

## 2023-12-15 ASSESSMENT — PAIN DESCRIPTION - LOCATION: LOCATION: SHOULDER

## 2023-12-15 NOTE — CARE COORDINATION
Case Management Assessment            Discharge Note                    Date / Time of Note: 12/15/2023 3:25 PM                  Discharge Note Completed by: Azeb Valverde RN    Patient Name: Gigi Miles   YOB: 1932  Diagnosis: Acute respiratory failure with hypoxia ([address]) [J96.01]  Pneumonia of right lower lobe due to infectious organism [J18.9]  Pneumonia due to infectious organism [J18.9]  Acute congestive heart failure, unspecified heart failure type [facility]) [I50.9]   Date / Time: 12/11/2023  1:44 PM    Current PCP: Bob Wylie MD  Clinic patient: Yes    Hospitalization in the last 30 days: Yes  Readmission Assessment  Number of Days since last admission?: 8-30 days  Previous Disposition: SNF  Who is being Interviewed: Patient  What was the patient's/caregiver's perception as to why they think they needed to return back to the hospital?: Other (Comment) (pneumonia)  Did you visit your Primary Care Physician after you left the hospital, before you returned this time?: No  Why weren't you able to visit your PCP?: Did not have an appointment  Did you see a specialist, such as Cardiac, Pulmonary, Orthopedic Physician, etc. after you left the hospital?: No  Who advised the patient to return to the hospital?: Skilled Unit  Does the patient report anything that got in the way of taking their medications?: No  In our efforts to provide the best possible care to you and others like you, can you think of anything that we could have done to help you after you left the hospital the first time, so that you might not have needed to return so soon?: Teach back instructions regarding management of illness, Discharge instructions that are concise, clear, and non contradictory, Teaching during hospitalization regarding your illness    Advance Directives:  Code Status: [address] DNR form completed and on chart: Yes    Financial:  Payor: MEDICARE / Plan: MEDICARE PART A AND B / Product

## 2023-12-15 NOTE — PROGRESS NOTES
Clinical Pharmacy Progress Note    Vancomycin - Management by Pharmacy    Consult Date(s): 12/13/23  Consulting Provider(s): Dr Aishwarya Barclay / Jessica    L shoulder septic arthritis/ extra-articular abscess: Vancomycin  Concurrent Antimicrobials:   Cefepime  Day of Vanc Therapy / Ordered Duration: #4 of 21  Current Dosing Method: Bayesian-Guided AUC Dosing  Therapeutic Goal: -600 mg/L*hr  Current Dose / Plan:   Renal function remains stable; SCr 0.9  Currently on 1000mg IV q24h. Dose adjusted 12/14 based on level  Regimen predicts AUC of 555 mg/L*h  ID discussing using PO ABx on discharge - if plan changes and pt will go home on IV vancomycin, will plan to recheck level tomorrow (not ordered)  Will continue to monitor clinical condition and make adjustments to regimen as appropriate. Please call with questions--  Dony Martinez, Nori, BCPS  Wireless: [id number]   12/15/2023 10:44 AM       Interval update:  S/p L shoulder arthrotomy w/ I&D 12/13. ID following - discussing treating with linezolid PO as outpatient. Subjective/Objective:   Jovanni Martinez is a 80 y.o. female with a PMHx significant for CAD, HTN, HLD, dementia, SSS s/p pacemaker, CVA, hypothyroidism, A.fib, OAB, venous stasis, and arthritis who is admitted with SOB - being treated for RLL pneumonia and ACS r/o. Pt also with left shoulder pain / swelling. Pharmacy is consulted to dose Vancomycin.     Ht Readings from Last 1 Encounters:   12/11/23 1.651 m (5' 5\")     Wt Readings from Last 1 Encounters:   12/15/23 67.1 kg (147 lb 14.9 oz)     Current & Prior Antimicrobial Regimen(s):  Cefepime (12/11-current)  Vancomycin - Pharmacy to dose  1000mg IV q24h (12/14-current)    Vancomycin Level(s) / Doses:    Date Time Dose Type of Level / Level Interpretation   12/14 0915 1250mg IV q24h Random = 20.8 mg/L Level drawn ~9h after prior dose  Calculated  mg/L*h, trough 16.4 mg/L  Decrease dose to 1000mg IV q24h          Note: Serum levels collected for AUC-based dosing may be high if collected in close proximity to the dose administered. This is not necessarily indicative of toxicity. Cultures & Sensitivities:    Date Site Micro Susceptibility / Result   12/11 COVID-19  Influenza A/B Not detected  Not detected     12/11 MRSA nasal PCR Not detected     12/11 Blood x2 NGTD     12/11 Legionella antigen negative     12/11 Strep pneumo antigen negative     12/11 Sputum  cx sent     12/12 Respiratory PCR No pathogens detected      12/13  Shoulder joint sent           Recent Labs     12/13/23  1012 12/14/23  0915 12/15/23  0858   CREATININE 0.7 0.8 0.9   BUN 16 18 23*   WBC 5.3 6.3 6.9       Estimated Creatinine Clearance: 37 mL/min (based on SCr of 0.9 mg/dL). Additional Lab Values / Findings of Note:    No results for input(s): \"PROCAL\" in the last 72 hours.

## 2023-12-15 NOTE — CARE COORDINATION
To [facility] at [facility] at [address] [place] [address] today by [facility]. Nurse report: [phone]  Faxed AVS to [facility].   Electronically signed by Ac Burciaga RN on 12/15/2023 at 3:46 PM

## 2023-12-15 NOTE — PROGRESS NOTES
Internal Medicine Progress Note    Date: 12/15/2023   Patient: [id number] [employer] Day: 4      CC: Shortness of Breath (Pt has low room air oxygen and does not wear oxygen at baseline. Hx if dementia)       Interval Hx     VTLs: WNL, 2 L O2  Patient resting comfortably in bed     Morning labs NL, hb improving   Yesterday Hb 9.1 w/high RDW (Fe studies ordered today)    Infectious workup pending:   - tissue cultures (acid fast & fungal) pending, anaerobic/aerobic cx neg  - aspirate: fungal cx neg, acid fast bacillus cx neg  - joint, shoulder: acid fast cx neg, pending fungal cx   - Wound: anaerobic & aerobic cx neg  - body fluid: no bugs, 3+ WBCs, pending (fungal cx)   - Strep & Legionella urine Ag neg   - Blood CX NGTD   - MRSA DNA probe neg  - Resp pathogens panel: neg  - COVID neg     ID: Oral Linezolid 600 BID x 3 weeks, weekly labs     Cardio: note mentioned dosing lasix, [facility] cardiology who said they spoke to Dr. Frederick Simon and he said to note start lasix right now. Weight 147 lb, down from 155 lb at admission). PT: 9/24 score. Worked with OT as well yesterday. Ortho: DVT, PT/OT, ok to D/C from ortho stand point     HPI: Maida Rinne is a [address] y.o. female with history significant of dementia, sick sinus syndrome (bipolar pacemaker), Paroxysmal A-fib on Eliquis, previous CVA, and recent hospital admission in Nov 2023 for AMS due to Hyponatremia and Streptococcus bacteremia with inpatient treatment with CTX, repeat blood cx neg x 2, and discharged w/ampicillin. She presents today from her nursing home due to nursing ome \"unable to get oxygen reading on her,\" chest pain today, and abdominal pain yesterday. On my evaluation, she is desatting to 85 without oxygen and satting at 97 and above with 3 L O2. She is alert and oriented to self. She thinks the year is 46 and that she is in [place].  When I spoke to her family, they informed me that she has dementia and some days are better than

## 2023-12-15 NOTE — PROGRESS NOTES
Patient and family agreeable to discharge. IV and tele removed.  Patient transported via [facility] to facility

## 2023-12-15 NOTE — PLAN OF CARE
Problem: Safety - Adult  Goal: Free from fall injury  12/15/2023 1658 by Tamika Nina RN  Outcome: Adequate for Discharge  12/15/2023 1658 by Tamika Nina RN  Outcome: Progressing     Problem: Chronic Conditions and Co-morbidities  Goal: Patient's chronic conditions and co-morbidity symptoms are monitored and maintained or improved  12/15/2023 1658 by Tamika Nina RN  Outcome: Adequate for Discharge  12/15/2023 1658 by Tamika Nina RN  Outcome: Progressing     Problem: Discharge Planning  Goal: Discharge to home or other facility with appropriate resources  12/15/2023 1658 by Tamika Nina RN  Outcome: Adequate for Discharge  12/15/2023 1658 by Tamika Nina RN  Outcome: Progressing     Problem: Skin/Tissue Integrity  Goal: Absence of new skin breakdown  Description: 1. Monitor for areas of redness and/or skin breakdown  2. Assess vascular access sites hourly  3. Every 4-6 hours minimum:  Change oxygen saturation probe site  4. Every 4-6 hours:  If on nasal continuous positive airway pressure, respiratory therapy assess nares and determine need for appliance change or resting period.   12/15/2023 1658 by Tmaika Nina RN  Outcome: Adequate for Discharge  12/15/2023 1658 by Tamika Nina RN  Outcome: Progressing     Problem: ABCDS Injury Assessment  Goal: Absence of physical injury  12/15/2023 1658 by Tamika Nina RN  Outcome: Adequate for Discharge  12/15/2023 1658 by Tamika Nina RN  Outcome: Progressing     Problem: Cardiovascular - Adult  Goal: Maintains optimal cardiac output and hemodynamic stability  12/15/2023 1658 by Tamika Nina RN  Outcome: Adequate for Discharge  12/15/2023 1658 by Tamika Nina RN  Outcome: Progressing  Goal: Absence of cardiac dysrhythmias or at baseline  12/15/2023 1658 by Tamika Nina RN  Outcome: Adequate for Discharge  12/15/2023 1658 by Tamika Nina RN  Outcome:

## 2023-12-16 LAB
BACTERIA BLD CULT ORG #2: NORMAL
BACTERIA BLD CULT: NORMAL
BACTERIA FLD AEROBE CULT: NORMAL
BACTERIA SPEC AEROBE CULT: ABNORMAL
BACTERIA SPEC AEROBE CULT: ABNORMAL
BACTERIA SPEC AEROBE CULT: NORMAL
BACTERIA SPEC ANAEROBE CULT: ABNORMAL
BACTERIA SPEC ANAEROBE CULT: NORMAL
GRAM STN SPEC: NORMAL
ORGANISM: ABNORMAL

## 2023-12-18 LAB
BACTERIA SPEC AEROBE CULT: ABNORMAL
BACTERIA SPEC AEROBE CULT: ABNORMAL
BACTERIA SPEC AEROBE CULT: NORMAL
BACTERIA SPEC ANAEROBE CULT: ABNORMAL
BACTERIA SPEC ANAEROBE CULT: NORMAL
ORGANISM: ABNORMAL

## 2023-12-20 LAB
FLUAV RNA SPEC QL NAA+PROBE: NOT DETECTED
FLUBV RNA SPEC QL NAA+PROBE: NOT DETECTED
RSV RNA SPEC QL NAA+PROBE: NOT DETECTED
RSV SOURCE: NORMAL

## 2023-12-25 LAB
BACTERIA FLD AEROBE CULT: NORMAL
BACTERIA SPEC AEROBE CULT: NORMAL
BACTERIA SPEC AEROBE CULT: NORMAL
BACTERIA SPEC ANAEROBE CULT: NORMAL
BACTERIA SPEC ANAEROBE CULT: NORMAL
FUNGUS SPEC CULT: NORMAL
GRAM STN SPEC: NORMAL
LOEFFLER MB STN SPEC: NORMAL

## 2023-12-26 LAB
ACID FAST STN SPEC QL: NORMAL
MYCOBACTERIUM SPEC CULT: NORMAL

## 2024-01-02 LAB
ACID FAST STN SPEC QL: NORMAL
MYCOBACTERIUM SPEC CULT: NORMAL

## 2024-01-08 LAB
FUNGUS SPEC CULT: NORMAL
LOEFFLER MB STN SPEC: NORMAL

## 2024-01-09 LAB
ACID FAST STN SPEC QL: NORMAL
MYCOBACTERIUM SPEC CULT: NORMAL

## 2024-01-11 PROBLEM — R79.89 ELEVATED TROPONIN: Status: RESOLVED | Noted: 2023-12-12 | Resolved: 2024-01-11

## 2024-01-15 LAB
FUNGUS SPEC CULT: NORMAL
LOEFFLER MB STN SPEC: NORMAL

## 2024-01-16 LAB
ACID FAST STN SPEC QL: NORMAL
MYCOBACTERIUM SPEC CULT: NORMAL

## 2024-01-23 LAB
ACID FAST STN SPEC QL: NORMAL
MYCOBACTERIUM SPEC CULT: NORMAL

## 2024-01-30 LAB
ACID FAST STN SPEC QL: NORMAL
MYCOBACTERIUM SPEC CULT: NORMAL

## (undated) DEVICE — INTENDED TO SUPPORT AND MAINTAIN THE POSITION OF AN ANESTHETIZED PATIENT DURING SURGERY: Brand: ERIN BEACH CHAIR FACE MASK

## (undated) DEVICE — ADHESIVE SKIN CLOSURE WND 8.661X1.5 IN 22 CM LIQUIBAND SECUR

## (undated) DEVICE — SOLUTION IV 1000ML 0.9% SOD CHL

## (undated) DEVICE — SYRINGE MED 30ML STD CLR PLAS LUERLOCK TIP N CTRL DISP

## (undated) DEVICE — BLANKET WRM W40.2XL55.9IN IORT LO BODY + MISTRAL AIR

## (undated) DEVICE — SHEET,DRAPE,53X77,STERILE: Brand: MEDLINE

## (undated) DEVICE — SUTURE MCRYL + SZ 4-0 L18IN ABSRB UD L19MM PS-2 3/8 CIR MCP496G

## (undated) DEVICE — APPLICATOR MEDICATED 26 CC SOLUTION HI LT ORNG CHLORAPREP

## (undated) DEVICE — NEEDLE SUT 1/2 CIR TAPR TIP TNSL STRL SZ 1 DAVIS

## (undated) DEVICE — STOCKINETTE ORTH W9XL36IN COT 2 PLY HLLW FOR HANDLING LMB

## (undated) DEVICE — DRAPE 70X60IN SPLIT IMPERV ADHES STRIP

## (undated) DEVICE — GENERAL: Brand: MEDLINE INDUSTRIES, INC.

## (undated) DEVICE — CLEANER,CAUTERY TIP,2X2",STERILE: Brand: MEDLINE

## (undated) DEVICE — KIT EVAC 0.13IN RECT TB DIA10FR 400CC PVC 3 SPR Y CONN DRN

## (undated) DEVICE — BLADE,CARBON-STEEL,15,STRL,DISPOSABLE,TB: Brand: MEDLINE

## (undated) DEVICE — COVER,TABLE,HEAVY DUTY,77"X90",STRL: Brand: MEDLINE

## (undated) DEVICE — COVER,MAYO STAND,XL,STERILE: Brand: MEDLINE

## (undated) DEVICE — 3M™ IOBAN™ 2 ANTIMICROBIAL INCISE DRAPE 6648EZ: Brand: IOBAN™ 2

## (undated) DEVICE — ELECTRODE NDL L2.8IN COAT LO PWR SET EDGE

## (undated) DEVICE — GOWN,SIRUS,POLYRNF,BRTHSLV,XLN/XXL,18/CS: Brand: MEDLINE

## (undated) DEVICE — 3M™ STERI-DRAPE™ U-DRAPE 1015: Brand: STERI-DRAPE™

## (undated) DEVICE — SPONGE GZ W4XL8IN COT WVN 12 PLY

## (undated) DEVICE — EYE PROTECTOR FOAM MEDICHOICE

## (undated) DEVICE — SOLUTION IRRIG 1000ML 09% SOD CHL USP PIC PLAS CONTAINER

## (undated) DEVICE — 1010 S-DRAPE TOWEL DRAPE 10/BX: Brand: STERI-DRAPE™

## (undated) DEVICE — BANDAGE COBAN 4 IN COMPR W4INXL5YD FOAM COHESIVE QUIK STK SELF ADH SFT

## (undated) DEVICE — TUBING, SUCTION, 1/4" X 12', STRAIGHT: Brand: MEDLINE

## (undated) DEVICE — GOWN,SIRUS,POLYRNF,BRTHSLV,XL,30/CS: Brand: MEDLINE

## (undated) DEVICE — GAUZE,SPONGE,4"X4",16PLY,XRAY,STRL,LF: Brand: MEDLINE

## (undated) DEVICE — PROTECTOR ULN NRV PUR FOAM HK LOOP STRP ANATOMICALLY

## (undated) DEVICE — SUTURE PDS + SZ 0 L27IN ABSRB VLT L36MM CT 1 1 2 CIR PDP340H

## (undated) DEVICE — GLOVE ORANGE PI 8   MSG9080

## (undated) DEVICE — TOWEL,STOP FLAG GOLD N-W: Brand: MEDLINE

## (undated) DEVICE — SYRINGE IRRIG 60ML SFT PLIABLE BLB EZ TO GRP 1 HND USE W/

## (undated) DEVICE — STANDARD HYPODERMIC NEEDLE,POLYPROPYLENE HUB: Brand: MONOJECT